# Patient Record
Sex: FEMALE | Race: BLACK OR AFRICAN AMERICAN | NOT HISPANIC OR LATINO
[De-identification: names, ages, dates, MRNs, and addresses within clinical notes are randomized per-mention and may not be internally consistent; named-entity substitution may affect disease eponyms.]

---

## 2017-03-27 ENCOUNTER — APPOINTMENT (OUTPATIENT)
Dept: INTERNAL MEDICINE | Facility: CLINIC | Age: 37
End: 2017-03-27

## 2017-03-27 VITALS
WEIGHT: 204 LBS | DIASTOLIC BLOOD PRESSURE: 60 MMHG | TEMPERATURE: 98 F | BODY MASS INDEX: 32.78 KG/M2 | HEIGHT: 66 IN | SYSTOLIC BLOOD PRESSURE: 110 MMHG | HEART RATE: 69 BPM | OXYGEN SATURATION: 99 %

## 2017-03-27 DIAGNOSIS — Z82.49 FAMILY HISTORY OF ISCHEMIC HEART DISEASE AND OTHER DISEASES OF THE CIRCULATORY SYSTEM: ICD-10-CM

## 2017-03-27 DIAGNOSIS — Z87.891 PERSONAL HISTORY OF NICOTINE DEPENDENCE: ICD-10-CM

## 2017-03-27 DIAGNOSIS — Z78.9 OTHER SPECIFIED HEALTH STATUS: ICD-10-CM

## 2017-03-27 DIAGNOSIS — Z82.3 FAMILY HISTORY OF STROKE: ICD-10-CM

## 2017-03-27 DIAGNOSIS — Z80.3 FAMILY HISTORY OF MALIGNANT NEOPLASM OF BREAST: ICD-10-CM

## 2017-03-27 DIAGNOSIS — H54.7 UNSPECIFIED VISUAL LOSS: ICD-10-CM

## 2017-03-27 DIAGNOSIS — O92.70 UNSPECIFIED DISORDERS OF LACTATION: ICD-10-CM

## 2017-03-27 DIAGNOSIS — N64.52 NIPPLE DISCHARGE: ICD-10-CM

## 2017-03-27 RX ORDER — MELATONIN 3 MG
25 MCG TABLET ORAL
Refills: 0 | Status: ACTIVE | COMMUNITY
Start: 2017-03-27

## 2017-03-27 RX ORDER — COPPER GLUCONATE 2 MG
250 TABLET ORAL
Refills: 0 | Status: ACTIVE | COMMUNITY
Start: 2017-03-27

## 2017-03-28 ENCOUNTER — APPOINTMENT (OUTPATIENT)
Dept: INTERNAL MEDICINE | Facility: CLINIC | Age: 37
End: 2017-03-28

## 2017-03-30 LAB
BASOPHILS # BLD AUTO: 0.03 K/UL
BASOPHILS NFR BLD AUTO: 0.9 %
CHOLEST SERPL-MCNC: 178 MG/DL
CHOLEST/HDLC SERPL: 2.2 RATIO
EOSINOPHIL # BLD AUTO: 0.14 K/UL
EOSINOPHIL NFR BLD AUTO: 4 %
FERRITIN SERPL-MCNC: 5.3 NG/ML
FOLATE SERPL-MCNC: 12.3 NG/ML
HBA1C MFR BLD HPLC: 5.5 %
HCT VFR BLD CALC: 30.7 %
HDLC SERPL-MCNC: 81 MG/DL
HGB BLD-MCNC: 9.4 G/DL
IMM GRANULOCYTES NFR BLD AUTO: 0.3 %
IRON SATN MFR SERPL: 6 %
IRON SERPL-MCNC: 18 UG/DL
LDLC SERPL CALC-MCNC: 87 MG/DL
LYMPHOCYTES # BLD AUTO: 1.92 K/UL
LYMPHOCYTES NFR BLD AUTO: 55.5 %
MAN DIFF?: NORMAL
MCHC RBC-ENTMCNC: 23.7 PG
MCHC RBC-ENTMCNC: 30.6 GM/DL
MCV RBC AUTO: 77.3 FL
MONOCYTES # BLD AUTO: 0.22 K/UL
MONOCYTES NFR BLD AUTO: 6.4 %
NEUTROPHILS # BLD AUTO: 1.14 K/UL
NEUTROPHILS NFR BLD AUTO: 32.9 %
PLATELET # BLD AUTO: 310 K/UL
PROLACTIN SERPL-MCNC: 13.4 NG/ML
RBC # BLD: 3.97 M/UL
RBC # FLD: 17.5 %
TIBC SERPL-MCNC: 321 UG/DL
TRIGL SERPL-MCNC: 50 MG/DL
TSH SERPL-ACNC: 0.91 UIU/ML
UIBC SERPL-MCNC: 303 UG/DL
VIT B12 SERPL-MCNC: >2000 PG/ML
WBC # FLD AUTO: 3.46 K/UL

## 2017-04-11 ENCOUNTER — TRANSCRIPTION ENCOUNTER (OUTPATIENT)
Age: 37
End: 2017-04-11

## 2017-07-03 ENCOUNTER — OUTPATIENT (OUTPATIENT)
Dept: OUTPATIENT SERVICES | Facility: HOSPITAL | Age: 37
LOS: 1 days | End: 2017-07-03
Payer: COMMERCIAL

## 2017-07-03 DIAGNOSIS — Z98.89 OTHER SPECIFIED POSTPROCEDURAL STATES: Chronic | ICD-10-CM

## 2017-07-03 PROCEDURE — A9585: CPT

## 2017-07-03 PROCEDURE — 72197 MRI PELVIS W/O & W/DYE: CPT

## 2017-07-03 PROCEDURE — 72197 MRI PELVIS W/O & W/DYE: CPT | Mod: 26

## 2017-07-12 ENCOUNTER — RESULT CHARGE (OUTPATIENT)
Age: 37
End: 2017-07-12

## 2017-07-12 ENCOUNTER — APPOINTMENT (OUTPATIENT)
Dept: INTERNAL MEDICINE | Facility: CLINIC | Age: 37
End: 2017-07-12

## 2017-07-12 VITALS
SYSTOLIC BLOOD PRESSURE: 100 MMHG | TEMPERATURE: 98.3 F | HEART RATE: 75 BPM | WEIGHT: 210 LBS | DIASTOLIC BLOOD PRESSURE: 80 MMHG | BODY MASS INDEX: 33.9 KG/M2 | OXYGEN SATURATION: 98 %

## 2017-07-12 DIAGNOSIS — N93.9 ABNORMAL UTERINE AND VAGINAL BLEEDING, UNSPECIFIED: ICD-10-CM

## 2017-07-12 DIAGNOSIS — Z01.818 ENCOUNTER FOR OTHER PREPROCEDURAL EXAMINATION: ICD-10-CM

## 2017-07-12 DIAGNOSIS — F40.01 AGORAPHOBIA WITH PANIC DISORDER: ICD-10-CM

## 2017-07-12 LAB
HCG UR QL: NEGATIVE
QUALITY CONTROL: YES

## 2017-07-12 RX ORDER — ANTIARTHRITIC COMBINATION NO.2 900 MG
5000 TABLET ORAL
Refills: 0 | Status: ACTIVE | COMMUNITY
Start: 2017-07-12

## 2017-07-13 LAB
BASOPHILS # BLD AUTO: 0.04 K/UL
BASOPHILS NFR BLD AUTO: 0.9 %
EOSINOPHIL # BLD AUTO: 0.09 K/UL
EOSINOPHIL NFR BLD AUTO: 2.1 %
HCT VFR BLD CALC: 39.2 %
HGB BLD-MCNC: 12.7 G/DL
IMM GRANULOCYTES NFR BLD AUTO: 0 %
LYMPHOCYTES # BLD AUTO: 1.95 K/UL
LYMPHOCYTES NFR BLD AUTO: 44.6 %
MAN DIFF?: NORMAL
MCHC RBC-ENTMCNC: 28.8 PG
MCHC RBC-ENTMCNC: 32.4 GM/DL
MCV RBC AUTO: 88.9 FL
MONOCYTES # BLD AUTO: 0.18 K/UL
MONOCYTES NFR BLD AUTO: 4.1 %
NEUTROPHILS # BLD AUTO: 2.11 K/UL
NEUTROPHILS NFR BLD AUTO: 48.3 %
PLATELET # BLD AUTO: 269 K/UL
RBC # BLD: 4.41 M/UL
RBC # FLD: 17 %
WBC # FLD AUTO: 4.37 K/UL

## 2017-07-14 LAB
ALBUMIN SERPL ELPH-MCNC: 4.3 G/DL
ALP BLD-CCNC: 44 U/L
ALT SERPL-CCNC: 14 U/L
ANION GAP SERPL CALC-SCNC: 13 MMOL/L
APPEARANCE: CLEAR
APTT BLD: 34.6 SEC
AST SERPL-CCNC: 29 U/L
BILIRUB SERPL-MCNC: 0.2 MG/DL
BILIRUBIN URINE: NEGATIVE
BLOOD URINE: NEGATIVE
BUN SERPL-MCNC: 15 MG/DL
CALCIUM SERPL-MCNC: 9.6 MG/DL
CHLORIDE SERPL-SCNC: 107 MMOL/L
CO2 SERPL-SCNC: 20 MMOL/L
COLOR: YELLOW
CREAT SERPL-MCNC: 0.91 MG/DL
GLUCOSE QUALITATIVE U: NORMAL MG/DL
GLUCOSE SERPL-MCNC: 88 MG/DL
INR PPP: 1.01 RATIO
KETONES URINE: NEGATIVE
LEUKOCYTE ESTERASE URINE: NEGATIVE
NITRITE URINE: NEGATIVE
PH URINE: 5
POTASSIUM SERPL-SCNC: 4.7 MMOL/L
PROT SERPL-MCNC: 7.3 G/DL
PROTEIN URINE: NEGATIVE MG/DL
PT BLD: 11.4 SEC
SODIUM SERPL-SCNC: 140 MMOL/L
SPECIFIC GRAVITY URINE: 1.01
UROBILINOGEN URINE: NORMAL MG/DL

## 2017-08-18 ENCOUNTER — OUTPATIENT (OUTPATIENT)
Dept: OUTPATIENT SERVICES | Facility: HOSPITAL | Age: 37
LOS: 1 days | End: 2017-08-18
Payer: COMMERCIAL

## 2017-08-18 DIAGNOSIS — Z98.89 OTHER SPECIFIED POSTPROCEDURAL STATES: Chronic | ICD-10-CM

## 2017-08-18 DIAGNOSIS — Z01.818 ENCOUNTER FOR OTHER PREPROCEDURAL EXAMINATION: ICD-10-CM

## 2017-08-18 LAB
ALBUMIN SERPL ELPH-MCNC: 4.1 G/DL — SIGNIFICANT CHANGE UP (ref 3.3–5)
ALP SERPL-CCNC: 47 U/L — SIGNIFICANT CHANGE UP (ref 40–120)
ALT FLD-CCNC: 15 U/L — SIGNIFICANT CHANGE UP (ref 10–45)
ANION GAP SERPL CALC-SCNC: 12 MMOL/L — SIGNIFICANT CHANGE UP (ref 5–17)
APTT BLD: 32.9 SEC — SIGNIFICANT CHANGE UP (ref 27.5–37.4)
AST SERPL-CCNC: 17 U/L — SIGNIFICANT CHANGE UP (ref 10–40)
BILIRUB SERPL-MCNC: 0.3 MG/DL — SIGNIFICANT CHANGE UP (ref 0.2–1.2)
BUN SERPL-MCNC: 17 MG/DL — SIGNIFICANT CHANGE UP (ref 7–23)
CALCIUM SERPL-MCNC: 9.9 MG/DL — SIGNIFICANT CHANGE UP (ref 8.4–10.5)
CHLORIDE SERPL-SCNC: 105 MMOL/L — SIGNIFICANT CHANGE UP (ref 96–108)
CO2 SERPL-SCNC: 26 MMOL/L — SIGNIFICANT CHANGE UP (ref 22–31)
CREAT SERPL-MCNC: 0.9 MG/DL — SIGNIFICANT CHANGE UP (ref 0.5–1.3)
GLUCOSE SERPL-MCNC: 88 MG/DL — SIGNIFICANT CHANGE UP (ref 70–99)
HCG SERPL-ACNC: <.1 MIU/ML — SIGNIFICANT CHANGE UP
HCT VFR BLD CALC: 38.5 % — SIGNIFICANT CHANGE UP (ref 34.5–45)
HGB BLD-MCNC: 12.8 G/DL — SIGNIFICANT CHANGE UP (ref 11.5–15.5)
INR BLD: 1.08 — SIGNIFICANT CHANGE UP (ref 0.88–1.16)
MCHC RBC-ENTMCNC: 29.7 PG — SIGNIFICANT CHANGE UP (ref 27–34)
MCHC RBC-ENTMCNC: 33.2 G/DL — SIGNIFICANT CHANGE UP (ref 32–36)
MCV RBC AUTO: 89.3 FL — SIGNIFICANT CHANGE UP (ref 80–100)
PLATELET # BLD AUTO: 234 K/UL — SIGNIFICANT CHANGE UP (ref 150–400)
POTASSIUM SERPL-MCNC: 5 MMOL/L — SIGNIFICANT CHANGE UP (ref 3.5–5.3)
POTASSIUM SERPL-SCNC: 5 MMOL/L — SIGNIFICANT CHANGE UP (ref 3.5–5.3)
PROT SERPL-MCNC: 7.4 G/DL — SIGNIFICANT CHANGE UP (ref 6–8.3)
PROTHROM AB SERPL-ACNC: 12 SEC — SIGNIFICANT CHANGE UP (ref 9.8–12.7)
RBC # BLD: 4.31 M/UL — SIGNIFICANT CHANGE UP (ref 3.8–5.2)
RBC # FLD: 13.4 % — SIGNIFICANT CHANGE UP (ref 10.3–16.9)
SODIUM SERPL-SCNC: 143 MMOL/L — SIGNIFICANT CHANGE UP (ref 135–145)
WBC # BLD: 4.7 K/UL — SIGNIFICANT CHANGE UP (ref 3.8–10.5)
WBC # FLD AUTO: 4.7 K/UL — SIGNIFICANT CHANGE UP (ref 3.8–10.5)

## 2017-08-18 PROCEDURE — 85027 COMPLETE CBC AUTOMATED: CPT

## 2017-08-18 PROCEDURE — 85610 PROTHROMBIN TIME: CPT

## 2017-08-18 PROCEDURE — 80053 COMPREHEN METABOLIC PANEL: CPT

## 2017-08-18 PROCEDURE — 36415 COLL VENOUS BLD VENIPUNCTURE: CPT

## 2017-08-18 PROCEDURE — 84702 CHORIONIC GONADOTROPIN TEST: CPT

## 2017-08-18 PROCEDURE — 85730 THROMBOPLASTIN TIME PARTIAL: CPT

## 2017-08-22 ENCOUNTER — OUTPATIENT (OUTPATIENT)
Dept: OUTPATIENT SERVICES | Facility: HOSPITAL | Age: 37
LOS: 1 days | End: 2017-08-22
Payer: COMMERCIAL

## 2017-08-22 DIAGNOSIS — Z98.89 OTHER SPECIFIED POSTPROCEDURAL STATES: Chronic | ICD-10-CM

## 2017-08-22 PROCEDURE — 36246 INS CATH ABD/L-EXT ART 2ND: CPT | Mod: 59

## 2017-08-22 PROCEDURE — 37243 VASC EMBOLIZE/OCCLUDE ORGAN: CPT

## 2017-08-22 PROCEDURE — C1887: CPT

## 2017-08-22 PROCEDURE — C1889: CPT

## 2017-08-22 PROCEDURE — C1769: CPT

## 2017-08-22 PROCEDURE — 36247 INS CATH ABD/L-EXT ART 3RD: CPT

## 2017-08-22 PROCEDURE — C1894: CPT

## 2017-08-22 RX ORDER — SENNA PLUS 8.6 MG/1
1 TABLET ORAL
Qty: 10 | Refills: 0 | OUTPATIENT
Start: 2017-08-22 | End: 2017-08-27

## 2017-08-22 RX ORDER — HYDROCODONE BITARTRATE AND ACETAMINOPHEN 7.5; 325 MG/15ML; MG/15ML
2 SOLUTION ORAL
Qty: 60 | Refills: 0
Start: 2017-08-22 | End: 2017-08-27

## 2017-08-22 RX ORDER — ONDANSETRON 8 MG/1
1 TABLET, FILM COATED ORAL
Qty: 15 | Refills: 0 | OUTPATIENT
Start: 2017-08-22 | End: 2017-08-27

## 2017-08-22 RX ORDER — SCOPALAMINE 1 MG/3D
1 PATCH, EXTENDED RELEASE TRANSDERMAL
Qty: 4 | Refills: 0 | OUTPATIENT
Start: 2017-08-22

## 2017-08-22 RX ORDER — SCOPALAMINE 1 MG/3D
1.5 PATCH, EXTENDED RELEASE TRANSDERMAL ONCE
Qty: 0 | Refills: 0 | Status: DISCONTINUED | OUTPATIENT
Start: 2017-08-22 | End: 2017-09-06

## 2017-08-22 RX ORDER — SCOPALAMINE 1 MG/3D
1 PATCH, EXTENDED RELEASE TRANSDERMAL
Qty: 3 | Refills: 0 | OUTPATIENT
Start: 2017-08-22 | End: 2017-09-01

## 2017-08-22 RX ORDER — ACETAMINOPHEN WITH CODEINE 300MG-30MG
2 TABLET ORAL
Qty: 40 | Refills: 0 | OUTPATIENT
Start: 2017-08-22 | End: 2017-08-27

## 2017-08-23 ENCOUNTER — EMERGENCY (EMERGENCY)
Facility: HOSPITAL | Age: 37
LOS: 1 days | Discharge: HOME | End: 2017-08-23
Attending: EMERGENCY MEDICINE | Admitting: EMERGENCY MEDICINE
Payer: COMMERCIAL

## 2017-08-23 VITALS
DIASTOLIC BLOOD PRESSURE: 93 MMHG | HEART RATE: 69 BPM | OXYGEN SATURATION: 97 % | SYSTOLIC BLOOD PRESSURE: 142 MMHG | RESPIRATION RATE: 18 BRPM | TEMPERATURE: 99 F

## 2017-08-23 DIAGNOSIS — Z98.89 OTHER SPECIFIED POSTPROCEDURAL STATES: Chronic | ICD-10-CM

## 2017-08-23 DIAGNOSIS — Z98.890 OTHER SPECIFIED POSTPROCEDURAL STATES: Chronic | ICD-10-CM

## 2017-08-23 DIAGNOSIS — D25.9 LEIOMYOMA OF UTERUS, UNSPECIFIED: ICD-10-CM

## 2017-08-23 DIAGNOSIS — R19.7 DIARRHEA, UNSPECIFIED: ICD-10-CM

## 2017-08-23 DIAGNOSIS — Z88.8 ALLERGY STATUS TO OTHER DRUGS, MEDICAMENTS AND BIOLOGICAL SUBSTANCES: ICD-10-CM

## 2017-08-23 DIAGNOSIS — R00.0 TACHYCARDIA, UNSPECIFIED: ICD-10-CM

## 2017-08-23 DIAGNOSIS — R10.30 LOWER ABDOMINAL PAIN, UNSPECIFIED: ICD-10-CM

## 2017-08-23 DIAGNOSIS — R06.02 SHORTNESS OF BREATH: ICD-10-CM

## 2017-08-23 DIAGNOSIS — J45.909 UNSPECIFIED ASTHMA, UNCOMPLICATED: ICD-10-CM

## 2017-08-23 DIAGNOSIS — Z79.899 OTHER LONG TERM (CURRENT) DRUG THERAPY: ICD-10-CM

## 2017-08-23 PROCEDURE — 80053 COMPREHEN METABOLIC PANEL: CPT

## 2017-08-23 PROCEDURE — 99284 EMERGENCY DEPT VISIT MOD MDM: CPT | Mod: 25

## 2017-08-23 PROCEDURE — 74177 CT ABD & PELVIS W/CONTRAST: CPT

## 2017-08-23 PROCEDURE — 96376 TX/PRO/DX INJ SAME DRUG ADON: CPT | Mod: XU

## 2017-08-23 PROCEDURE — 96374 THER/PROPH/DIAG INJ IV PUSH: CPT | Mod: XU

## 2017-08-23 PROCEDURE — 85730 THROMBOPLASTIN TIME PARTIAL: CPT

## 2017-08-23 PROCEDURE — 36415 COLL VENOUS BLD VENIPUNCTURE: CPT

## 2017-08-23 PROCEDURE — 85025 COMPLETE CBC W/AUTO DIFF WBC: CPT

## 2017-08-23 PROCEDURE — 83690 ASSAY OF LIPASE: CPT

## 2017-08-23 PROCEDURE — 74177 CT ABD & PELVIS W/CONTRAST: CPT | Mod: 26

## 2017-08-23 PROCEDURE — 85610 PROTHROMBIN TIME: CPT

## 2017-08-23 PROCEDURE — 96375 TX/PRO/DX INJ NEW DRUG ADDON: CPT | Mod: XU

## 2017-08-23 RX ORDER — MORPHINE SULFATE 50 MG/1
2 CAPSULE, EXTENDED RELEASE ORAL ONCE
Qty: 0 | Refills: 0 | Status: DISCONTINUED | OUTPATIENT
Start: 2017-08-23 | End: 2017-08-23

## 2017-08-23 RX ORDER — METOCLOPRAMIDE HCL 10 MG
10 TABLET ORAL ONCE
Qty: 0 | Refills: 0 | Status: DISCONTINUED | OUTPATIENT
Start: 2017-08-23 | End: 2017-08-23

## 2017-08-23 RX ORDER — ONDANSETRON 8 MG/1
4 TABLET, FILM COATED ORAL ONCE
Qty: 0 | Refills: 0 | Status: COMPLETED | OUTPATIENT
Start: 2017-08-23 | End: 2017-08-23

## 2017-08-23 RX ORDER — KETOROLAC TROMETHAMINE 30 MG/ML
1 SYRINGE (ML) INJECTION
Qty: 9 | Refills: 0
Start: 2017-08-23 | End: 2017-08-26

## 2017-08-23 RX ORDER — KETOROLAC TROMETHAMINE 30 MG/ML
30 SYRINGE (ML) INJECTION ONCE
Qty: 0 | Refills: 0 | Status: DISCONTINUED | OUTPATIENT
Start: 2017-08-23 | End: 2017-08-23

## 2017-08-23 RX ORDER — TRAMADOL HYDROCHLORIDE 50 MG/1
1 TABLET ORAL
Qty: 9 | Refills: 0 | OUTPATIENT
Start: 2017-08-23 | End: 2017-08-26

## 2017-08-23 RX ORDER — MORPHINE SULFATE 50 MG/1
1 CAPSULE, EXTENDED RELEASE ORAL ONCE
Qty: 0 | Refills: 0 | Status: DISCONTINUED | OUTPATIENT
Start: 2017-08-23 | End: 2017-08-23

## 2017-08-23 RX ADMIN — MORPHINE SULFATE 2 MILLIGRAM(S): 50 CAPSULE, EXTENDED RELEASE ORAL at 00:50

## 2017-08-23 RX ADMIN — MORPHINE SULFATE 2 MILLIGRAM(S): 50 CAPSULE, EXTENDED RELEASE ORAL at 01:10

## 2017-08-23 RX ADMIN — Medication 30 MILLIGRAM(S): at 03:32

## 2017-08-23 RX ADMIN — Medication 30 MILLIGRAM(S): at 04:08

## 2017-08-23 RX ADMIN — MORPHINE SULFATE 1 MILLIGRAM(S): 50 CAPSULE, EXTENDED RELEASE ORAL at 02:10

## 2017-08-23 RX ADMIN — MORPHINE SULFATE 1 MILLIGRAM(S): 50 CAPSULE, EXTENDED RELEASE ORAL at 03:34

## 2017-08-23 RX ADMIN — ONDANSETRON 4 MILLIGRAM(S): 8 TABLET, FILM COATED ORAL at 01:10

## 2017-08-23 NOTE — ED PROVIDER NOTE - NS ED ROS FT
+ b/l lower abd pain, light vaginal bleeding, nausea  - dysuria, weakness, hematuria, diarrhea, vomiting, SOB, palpitations

## 2017-08-23 NOTE — ED PROVIDER NOTE - MEDICAL DECISION MAKING DETAILS
Pain not controlled with PO pain meds. Will give IV morphine. Ordered for CBC to r/o active bleeding, CT abd/pelvis to assess for post surgical complications. Pain not controlled with PO pain meds. Will give IV morphine, zofran. Ordered for CBC to r/o active bleeding, CT abd/pelvis to assess for post surgical complications.

## 2017-08-23 NOTE — ED ADULT NURSE REASSESSMENT NOTE - NS ED NURSE REASSESS COMMENT FT1
36 y/o female, s/p uterine embolization, arrived to [facility] ER reporting severe lower abdominal pain accompanied with nausea after procedure 8/22/17. Pt verbalized pain is more intense then before procedure was performed. Upon assessment, abdomen soft, lung fields WNL, breathing is equal and unlabored, pulses palpable. Pt denies chest pain, headache, dizziness, blurred vision, slurred speech, fever, chills, LOC, SOB, weakness, fatigue, recent travel, recent injury, and palpitations. Care in progress. Awaiting disposition
Pt was evaluated, treated and discharged to home. Follow up instructions provided.
IVHL removed from RAC, pt TBD, 2/10 pain, Dr Salinas aware.

## 2017-08-23 NOTE — ED PROVIDER NOTE - FAMILY HISTORY
Mother  Still living? Unknown  Family history of hypertension, Age at diagnosis: Age Unknown     Father  Still living? Unknown  Family history of pulmonary embolism, Age at diagnosis: Age Unknown

## 2017-08-23 NOTE — ED PROVIDER NOTE - OBJECTIVE STATEMENT
Patient is a 37 year old female with a PMHx of asthma and fibroids s/p uterine artery emobolization this AM by IR (Dr Oconnor) who presents with persistent lower abdominal pain. She describes it as crampy and sharp that radiates to the back and is relieved by putting herself in the fetal position. She also notes light vaginal bleeding since the morning as well. Patient was given a Rx for percocet and was instructed to call IR if her pain was not controlled however no one answered. Denies pain at incision site. Patient also reports nausea but no vomiting, diarrhea, CP, palpitations, weakness or SOB. Patient is a 37 year old female with a PMHx of asthma and fibroids s/p uterine artery emobolization this AM by IR (Dr Oconnor) who presents with persistent lower abdominal pain. She describes it as crampy and sharp that radiates to the back and is relieved by putting herself in the fetal position. She also notes light vaginal bleeding since the morning as well. Patient was given a Rx for percocet and was instructed to call IR if her pain was not controlled however no one answered. Denies pain at incision site. Patient also reports nausea but no vomiting, diarrhea, CP, palpitations, weakness or SOB.    MRI pelvis w/w/out 8/18: 2 enhancing uterine fibroids measuring up to 1.8 cm. Evidence of adenomyosis.  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 Prominent right uterine artery. No evidence of collateral flow to the uterus. Right uterine artery embolization performed, as described above. Extravascular closure device.

## 2017-08-23 NOTE — ED PROVIDER NOTE - ATTENDING CONTRIBUTION TO CARE
36yo female with R uterine artery embolization for fibroids and bleeding earlier today returning to ER with recurrent pelvic pain. Pt denies vaginal bleeding, discharge. States that pain is similar to severe menstrual cramping. CT scan negative for acute bleeding or perforation. H/H stable, pain control and discharge.

## 2018-01-22 ENCOUNTER — APPOINTMENT (OUTPATIENT)
Dept: INTERNAL MEDICINE | Facility: CLINIC | Age: 38
End: 2018-01-22
Payer: COMMERCIAL

## 2018-01-22 ENCOUNTER — RX RENEWAL (OUTPATIENT)
Age: 38
End: 2018-01-22

## 2018-01-22 VITALS
DIASTOLIC BLOOD PRESSURE: 78 MMHG | SYSTOLIC BLOOD PRESSURE: 113 MMHG | OXYGEN SATURATION: 98 % | WEIGHT: 221 LBS | TEMPERATURE: 98.1 F | HEART RATE: 80 BPM | BODY MASS INDEX: 35.67 KG/M2

## 2018-01-22 DIAGNOSIS — Z86.69 PERSONAL HISTORY OF OTHER DISEASES OF THE NERVOUS SYSTEM AND SENSE ORGANS: ICD-10-CM

## 2018-01-22 PROCEDURE — 99214 OFFICE O/P EST MOD 30 MIN: CPT | Mod: 25,GC

## 2018-01-22 RX ORDER — ALPRAZOLAM 0.5 MG/1
0.5 TABLET ORAL
Qty: 2 | Refills: 0 | Status: DISCONTINUED | COMMUNITY
Start: 2017-07-12 | End: 2018-01-22

## 2018-02-26 ENCOUNTER — RX RENEWAL (OUTPATIENT)
Age: 38
End: 2018-02-26

## 2018-03-01 ENCOUNTER — CLINICAL ADVICE (OUTPATIENT)
Age: 38
End: 2018-03-01

## 2018-03-01 ENCOUNTER — APPOINTMENT (OUTPATIENT)
Dept: INTERNAL MEDICINE | Facility: CLINIC | Age: 38
End: 2018-03-01

## 2018-03-02 ENCOUNTER — APPOINTMENT (OUTPATIENT)
Dept: INTERNAL MEDICINE | Facility: CLINIC | Age: 38
End: 2018-03-02
Payer: COMMERCIAL

## 2018-03-02 VITALS
BODY MASS INDEX: 37.12 KG/M2 | DIASTOLIC BLOOD PRESSURE: 81 MMHG | RESPIRATION RATE: 14 BRPM | OXYGEN SATURATION: 100 % | WEIGHT: 230 LBS | HEART RATE: 77 BPM | SYSTOLIC BLOOD PRESSURE: 116 MMHG | TEMPERATURE: 98.2 F

## 2018-03-02 DIAGNOSIS — R10.13 EPIGASTRIC PAIN: ICD-10-CM

## 2018-03-02 PROCEDURE — 36415 COLL VENOUS BLD VENIPUNCTURE: CPT

## 2018-03-02 PROCEDURE — 99214 OFFICE O/P EST MOD 30 MIN: CPT | Mod: 25,GC

## 2018-03-02 RX ORDER — CIPROFLOXACIN 3 MG/ML
0.3 SOLUTION OPHTHALMIC
Qty: 1 | Refills: 0 | Status: DISCONTINUED | COMMUNITY
Start: 2018-01-22 | End: 2018-03-02

## 2018-03-07 LAB — LPL SERPL-CCNC: 32 U/L

## 2018-05-24 ENCOUNTER — RX RENEWAL (OUTPATIENT)
Age: 38
End: 2018-05-24

## 2018-06-05 ENCOUNTER — APPOINTMENT (OUTPATIENT)
Dept: INTERNAL MEDICINE | Facility: CLINIC | Age: 38
End: 2018-06-05

## 2018-07-09 ENCOUNTER — APPOINTMENT (OUTPATIENT)
Dept: INTERNAL MEDICINE | Facility: CLINIC | Age: 38
End: 2018-07-09

## 2019-02-07 PROBLEM — D25.9 LEIOMYOMA OF UTERUS, UNSPECIFIED: Chronic | Status: ACTIVE | Noted: 2017-08-23

## 2019-04-01 PROBLEM — N64.52 DISCHARGE OF BREAST: Status: ACTIVE | Noted: 2017-03-27

## 2019-04-19 ENCOUNTER — APPOINTMENT (OUTPATIENT)
Dept: ENDOCRINOLOGY | Facility: CLINIC | Age: 39
End: 2019-04-19
Payer: COMMERCIAL

## 2019-04-19 VITALS
BODY MASS INDEX: 43.32 KG/M2 | DIASTOLIC BLOOD PRESSURE: 81 MMHG | HEART RATE: 80 BPM | SYSTOLIC BLOOD PRESSURE: 116 MMHG | WEIGHT: 260 LBS | HEIGHT: 65 IN

## 2019-04-19 DIAGNOSIS — J30.2 OTHER SEASONAL ALLERGIC RHINITIS: ICD-10-CM

## 2019-04-19 PROCEDURE — 99204 OFFICE O/P NEW MOD 45 MIN: CPT

## 2019-04-19 RX ORDER — MULTIVITAMIN
CAPSULE ORAL
Refills: 0 | Status: ACTIVE | COMMUNITY

## 2019-04-22 LAB
25(OH)D3 SERPL-MCNC: 40.8 NG/ML
ALBUMIN SERPL ELPH-MCNC: 3.8 G/DL
ALP BLD-CCNC: 45 U/L
ALT SERPL-CCNC: 16 U/L
ANION GAP SERPL CALC-SCNC: 14 MMOL/L
AST SERPL-CCNC: 24 U/L
BASOPHILS # BLD AUTO: 0.02 K/UL
BASOPHILS NFR BLD AUTO: 0.5 %
BILIRUB SERPL-MCNC: <0.2 MG/DL
BUN SERPL-MCNC: 17 MG/DL
CALCIUM SERPL-MCNC: 9.2 MG/DL
CHLORIDE SERPL-SCNC: 108 MMOL/L
CHOLEST SERPL-MCNC: 161 MG/DL
CHOLEST/HDLC SERPL: 2.4 RATIO
CO2 SERPL-SCNC: 19 MMOL/L
CREAT SERPL-MCNC: 0.8 MG/DL
EOSINOPHIL # BLD AUTO: 0.06 K/UL
EOSINOPHIL NFR BLD AUTO: 1.6 %
ESTIMATED AVERAGE GLUCOSE: 97 MG/DL
GLUCOSE SERPL-MCNC: 100 MG/DL
HBA1C MFR BLD HPLC: 5 %
HCT VFR BLD CALC: 36.7 %
HDLC SERPL-MCNC: 67 MG/DL
HGB BLD-MCNC: 11.6 G/DL
IMM GRANULOCYTES NFR BLD AUTO: 0.3 %
LDLC SERPL CALC-MCNC: 82 MG/DL
LYMPHOCYTES # BLD AUTO: 1.86 K/UL
LYMPHOCYTES NFR BLD AUTO: 48.7 %
MAN DIFF?: NORMAL
MCHC RBC-ENTMCNC: 28 PG
MCHC RBC-ENTMCNC: 31.6 GM/DL
MCV RBC AUTO: 88.6 FL
MONOCYTES # BLD AUTO: 0.37 K/UL
MONOCYTES NFR BLD AUTO: 9.7 %
NEUTROPHILS # BLD AUTO: 1.5 K/UL
NEUTROPHILS NFR BLD AUTO: 39.2 %
PLATELET # BLD AUTO: 214 K/UL
POTASSIUM SERPL-SCNC: 4.6 MMOL/L
PROLACTIN SERPL-MCNC: 10.3 NG/ML
PROT SERPL-MCNC: 6.5 G/DL
RBC # BLD: 4.14 M/UL
RBC # FLD: 14.1 %
SODIUM SERPL-SCNC: 141 MMOL/L
T4 FREE SERPL-MCNC: 1 NG/DL
TRIGL SERPL-MCNC: 61 MG/DL
TSH SERPL-ACNC: 0.84 UIU/ML
WBC # FLD AUTO: 3.82 K/UL

## 2019-04-23 ENCOUNTER — TRANSCRIPTION ENCOUNTER (OUTPATIENT)
Age: 39
End: 2019-04-23

## 2019-04-29 LAB
MONOMERIC PROLACTIN (ICMA)*: 8 NG/ML
PERCENT MACROPROLACTIN: 6 %
PROLACTIN, SERUM (ICMA)*: 8.5 NG/ML

## 2019-04-29 NOTE — HISTORY OF PRESENT ILLNESS
[FreeTextEntry1] : Ms. Mittal is a 38 year-old woman with a history of migraine headaches, elevated body mass index presenting to establish care with me.\par \par Elevated body mass index. Comorbidity of gastroesophageal reflux disease. She is status post laparoscopic banding in 2011 and gastric sleeve in 2014.\par Her young adult weight was around 200 pounds. Her highest weight was 307 pounds. \par Her roya weight after her second surgical procedure was 196 pounds. She attributes recent weight gain to uterine artery embolization. \par She has never tried any medications for weight loss.\par She will be establishing care with a nutritionist through her surgeon.\par 24 hour diet recall: breakfast, slice of white toast, ice coffee with creamer; lunch, fried fish, macaroni and cheese, ele greens; dinner, steak quesadilla; snacks, sunflower seeds; had Sangria yesterday\par \par Galactorrhea.\par She has had expressible galactorrhea since the birth of her last child, who is now 18 years old.\par She has a history of migraine headaches on the left side of her head, with photophobia. She now has associated nausea and dizziness. Her headaches used to occur around the time of her menstrual period, now 7-8 times per month. Sumatriptan is now less effective.\par She wears glasses; no recent change in vision. \par Menses are regular.\par Prolactin was 13.4 ng/mL in March 2017.\par \par She has fatigue, hoarse voice, dry skin, polydipsia; discussed with her other treating physicians. Her mother and sister have a history of nontoxic multinodular goiter; no thyroid cancer.

## 2019-04-29 NOTE — ASSESSMENT
[FreeTextEntry1] : Elevated body mass index. Comorbidity of gastroesophageal reflux disease. She is status post laparoscopic banding in 2011 and gastric sleeve in 2014. We reviewed lifestyle modifications for weight loss. She will establish care with nutrition. We discussed pharmacologic options for weight loss. Due to comorbid migraine headaches, we discussed topiramate. We discussed the risks and benefits, including but not limited to dizziness, drowsiness, paresthesias.\par Check laboratory testing as below\par Lifestyle modification\par Establish care with nutrition\par Start topiramate 25 mg at night for one week, then 25 mg twice daily for one week, then 50 mg twice daily as tolerated\par \par Expressible galactorrhea. Prolactin was normal in 2017. We will repeat now for further evaluation.\par Check prolactin\par \par Thyromegaly. Her mother and sister have a history of nontoxic multinodular goiter; no thyroid cancer.\par Check thyroid ultrasound\par \par She will see a neurologist for further management of migraine headache.\par \par CC:\par Dr. Jessie So, Fax [phone]

## 2019-04-29 NOTE — ADDENDUM
[FreeTextEntry1] : Recent test results as below. Prolactin again normal and galactorrhea appears idiopathic. Other test results within range. Will send a letter to Ms. Mittal with results. 4/22/19

## 2019-04-29 NOTE — PHYSICAL EXAM
[Alert] : alert [No Acute Distress] : no acute distress [Normal Sclera/Conjunctiva] : normal sclera/conjunctiva [Healthy Appearance] : healthy appearance [Normal Oropharynx] : the oropharynx was normal [Visual Fields Grossly Intact] : visual fields grossly intact [Supple] : the neck was supple [No Neck Mass] : no neck mass was observed [No LAD] : no lymphadenopathy [Normal Rate and Effort] : normal respiratory rhythm and effort [Clear to Auscultation] : lungs were clear to auscultation bilaterally [Normal S1, S2] : normal S1 and S2 [Normal Rate] : heart rate was normal  [Regular Rhythm] : with a regular rhythm [Normal Gait] : normal gait [No Stigmata of Cushings Syndrome] : no stigmata of cushings syndrome [Normal Insight/Judgement] : insight and judgment were intact [Acanthosis Nigricans] : acanthosis nigricans [Kyphosis] : no kyphosis present [de-identified] : thyroid about 1.5 times normal size, no discrete nodules noted [de-identified] : no moon facies, no supraclavicular fat pads

## 2019-06-19 ENCOUNTER — RX RENEWAL (OUTPATIENT)
Age: 39
End: 2019-06-19

## 2019-09-26 ENCOUNTER — RESULT REVIEW (OUTPATIENT)
Age: 39
End: 2019-09-26

## 2019-09-26 ENCOUNTER — OUTPATIENT (OUTPATIENT)
Dept: OUTPATIENT SERVICES | Facility: HOSPITAL | Age: 39
LOS: 1 days | Discharge: HOME | End: 2019-09-26

## 2019-09-26 DIAGNOSIS — Z98.89 OTHER SPECIFIED POSTPROCEDURAL STATES: Chronic | ICD-10-CM

## 2019-09-26 DIAGNOSIS — Z98.890 OTHER SPECIFIED POSTPROCEDURAL STATES: Chronic | ICD-10-CM

## 2019-09-27 LAB — SURGICAL PATHOLOGY STUDY: SIGNIFICANT CHANGE UP

## 2019-10-08 ENCOUNTER — APPOINTMENT (OUTPATIENT)
Dept: ENDOCRINOLOGY | Facility: CLINIC | Age: 39
End: 2019-10-08

## 2019-12-09 ENCOUNTER — APPOINTMENT (OUTPATIENT)
Dept: ENDOCRINOLOGY | Facility: CLINIC | Age: 39
End: 2019-12-09
Payer: COMMERCIAL

## 2019-12-09 VITALS
WEIGHT: 265 LBS | HEART RATE: 78 BPM | DIASTOLIC BLOOD PRESSURE: 85 MMHG | SYSTOLIC BLOOD PRESSURE: 120 MMHG | BODY MASS INDEX: 44.1 KG/M2

## 2019-12-09 DIAGNOSIS — N64.3 GALACTORRHEA NOT ASSOCIATED WITH CHILDBIRTH: ICD-10-CM

## 2019-12-09 PROCEDURE — 99214 OFFICE O/P EST MOD 30 MIN: CPT

## 2019-12-13 ENCOUNTER — RX RENEWAL (OUTPATIENT)
Age: 39
End: 2019-12-13

## 2019-12-17 NOTE — PHYSICAL EXAM
[Alert] : alert [No Acute Distress] : no acute distress [Healthy Appearance] : healthy appearance [Normal Sclera/Conjunctiva] : normal sclera/conjunctiva [Visual Fields Grossly Intact] : visual fields grossly intact [Normal Oropharynx] : the oropharynx was normal [No Neck Mass] : no neck mass was observed [Supple] : the neck was supple [No LAD] : no lymphadenopathy [Normal Rate and Effort] : normal respiratory rhythm and effort [Clear to Auscultation] : lungs were clear to auscultation bilaterally [Normal Rate] : heart rate was normal  [Normal S1, S2] : normal S1 and S2 [Regular Rhythm] : with a regular rhythm [No Stigmata of Cushings Syndrome] : no stigmata of cushings syndrome [Normal Gait] : normal gait [Acanthosis Nigricans] : acanthosis nigricans [Normal Insight/Judgement] : insight and judgment were intact [Kyphosis] : no kyphosis present [de-identified] : thyroid about 1.5 times normal size, no discrete nodules noted [de-identified] : no moon facies, no supraclavicular fat pads

## 2019-12-17 NOTE — ADDENDUM
[FreeTextEntry1] : Recent thyroid ultrasound with subcentimeter cystic nodules in the right lobe; discussed with Ms. Mittal. No nodules meet criteria for biopsy. We can repeat a thyroid ultrasound in 2-3 years for monitoring. 12/17/19

## 2019-12-17 NOTE — ASSESSMENT
[FreeTextEntry1] : Elevated body mass index. Comorbidity of gastroesophageal reflux disease. She is status post laparoscopic banding in 2011 and gastric sleeve in 2014. We reviewed lifestyle modifications for weight loss. We discussed referral to nutrition. We discussed pharmacologic options for weight loss. She is tolerating topiramate and we can continue. We discussed addition of phentermine and she is amenable. We discussed the risks and benefits, including but not limited to headache, palpitations, hypertension. \par Lifestyle modification\par Referral to nutrition\par Continue topiramate 50 mg twice daily\par Start phentermine 15 mg daily\par \par Thyromegaly. Her mother and sister have a history of nontoxic multinodular goiter; no thyroid cancer.\par Check thyroid ultrasound\par \par Expressible galactorrhea. Idiopathic. Prolactin has been normal. \par \par Migraine headaches. I advised she see a neurologist.\par \par Return to see me in 2 months. \par \par CC:\par Dr. Jessie So, Fax [phone]

## 2019-12-17 NOTE — HISTORY OF PRESENT ILLNESS
[FreeTextEntry1] : Ms. Mittal is a 39 year-old woman with a history of migraine headaches, elevated body mass index presenting for follow-up of elevated body mass index. I saw her for an initial visit in April 2019. \par \par Elevated body mass index. Comorbidity of gastroesophageal reflux disease. She is status post laparoscopic banding in 2011 and gastric sleeve in 2014.\par Her young adult weight was around 200 pounds. Her highest weight was 307 pounds. \par Her roya weight after her second surgical procedure was 196 pounds. She attributes recent weight gain to uterine artery embolization. \par She had never tried any medications for weight loss. We started topiramate up to 50 mg twice daily in April 2019.\par 24 hour diet recall: breakfast, 1/2 turkey and cheese wrap, a little mayonnaise, lettuce, tomato, iced coffee; lunch, 1/2 turkey and cheese wrap, a little mayonnaise, lettuce, tomato; dinner, shrimp, chicken, pepper stir johnson with teriyaki sauce, greens; peach juice\par Exercise: Stationary bicycle 15-30 minutes most days\par \par Galactorrhea.\par She has had expressible galactorrhea since the birth of her last child, who is now 18 years old.\par She has a history of migraine headaches on the left side of her head, with photophobia. She was having associated nausea and dizziness. Her headaches have decreased in number since starting topiramate.\par Menses are regular.\par Prolactin levels have been normal. \par She has annual mammogram testing through her gynecologist. \par \par Interim History \par Last visit we started topiramate for weight loss, with improvement in migraine headache frequency. Weight is up 5 pounds since last visit.\par She had a recent biopsy to evaluate for endometrial hyperplasia.\par Medical and surgical history, medications, allergies, social and family history reviewed and updated as needed.

## 2019-12-23 ENCOUNTER — APPOINTMENT (OUTPATIENT)
Dept: ENDOCRINOLOGY | Facility: CLINIC | Age: 39
End: 2019-12-23
Payer: COMMERCIAL

## 2019-12-23 VITALS — BODY MASS INDEX: 42.43 KG/M2 | WEIGHT: 255 LBS

## 2019-12-23 PROCEDURE — 97802 MEDICAL NUTRITION INDIV IN: CPT

## 2020-01-30 ENCOUNTER — APPOINTMENT (OUTPATIENT)
Dept: ENDOCRINOLOGY | Facility: CLINIC | Age: 40
End: 2020-01-30
Payer: COMMERCIAL

## 2020-01-30 VITALS
WEIGHT: 244 LBS | BODY MASS INDEX: 40.65 KG/M2 | SYSTOLIC BLOOD PRESSURE: 122 MMHG | HEIGHT: 65 IN | HEART RATE: 102 BPM | DIASTOLIC BLOOD PRESSURE: 84 MMHG

## 2020-01-30 PROCEDURE — 99214 OFFICE O/P EST MOD 30 MIN: CPT

## 2020-01-30 NOTE — HISTORY OF PRESENT ILLNESS
[FreeTextEntry1] : Ms. Mittal is a 39 year-old woman with a history of migraine headaches, elevated body mass index presenting for follow-up of elevated body mass index. I saw her for an initial visit in April 2019 and last in December. \par \par Elevated body mass index. Comorbidity of gastroesophageal reflux disease. She is status post laparoscopic banding in 2011 and gastric sleeve in 2014.\par Her young adult weight was around 200 pounds. Her highest weight was 307 pounds. \par Her roya weight after her second surgical procedure was 196 pounds. She attributes recent weight gain to uterine artery embolization. \par She had never tried any medications for weight loss. We started topiramate up to 50 mg twice daily in April 2019 and phentermine 15 mg daily in December.\par \par Thyroid nodules.\par She was noted to have right subcentimeter thyroid nodules on ultrasound in December 2019. \par She has been clinically and biochemically euthyroid. \par Her mother and sister have a history of thyroid nodules. No family history of thyroid cancer. \par \par Galactorrhea.\par She has had expressible galactorrhea since the birth of her last child, who is now 18 years old.\par She has a history of migraine headaches on the left side of her head, with photophobia. She was having associated nausea and dizziness. Her headaches have decreased in number since starting topiramate.\par Menses are regular.\par Prolactin levels have been normal. \par She has annual mammogram testing through her gynecologist. \par \par Interim History \par Last visit we started phentermine. She has been using the [employer] jakob. She saw Kamilla Cardona/yu in December. \par Thyroid ultrasound in December 2019 with subcentimeter cystic nodules in the right lobe. No nodules meet criteria for biopsy. \par Weight is down 19 pounds since last visit. No migraine headaches. \par Medical and surgical history, medications, allergies, social and family history reviewed and updated as needed.

## 2020-01-30 NOTE — PHYSICAL EXAM
[Alert] : alert [No Acute Distress] : no acute distress [Healthy Appearance] : healthy appearance [Normal Sclera/Conjunctiva] : normal sclera/conjunctiva [Visual Fields Grossly Intact] : visual fields grossly intact [Normal Oropharynx] : the oropharynx was normal [No Neck Mass] : no neck mass was observed [Supple] : the neck was supple [No LAD] : no lymphadenopathy [Normal Rate and Effort] : normal respiratory rhythm and effort [Clear to Auscultation] : lungs were clear to auscultation bilaterally [Normal Rate] : heart rate was normal  [Normal S1, S2] : normal S1 and S2 [Regular Rhythm] : with a regular rhythm [No Stigmata of Cushings Syndrome] : no stigmata of cushings syndrome [Normal Gait] : normal gait [Acanthosis Nigricans] : acanthosis nigricans [Normal Insight/Judgement] : insight and judgment were intact [Kyphosis] : no kyphosis present [de-identified] : thyroid about 1.5 times normal size, no discrete nodules noted [de-identified] : no moon facies, no supraclavicular fat pads

## 2020-01-30 NOTE — DATA REVIEWED
[FreeTextEntry1] : Thyroid ultrasound (December 16, 2019) reviewed and significant for:\par ISTHMUS: Normal size in AP dimension measuring 0.4 cm.\par RIGHT LOBE:  Measures 4.7 x 1.5 x 2.1 cm in sagittal x AP x transverse dimensions.  Volume 7.9 cc.\par ARCHITECTURE: Upper pole cystic nodule measures up to 0.5 cm. Upper midpole cystic nodule measures up to 0.2 cm. Midpole cystic nodule measures up to 0.3 cm.\par COLOR DOPPLER:  Unremarkable. \par LEFT LOBE:    Measures 4.7 x 1.1 x 2.3 cm in sagittal x AP x transverse dimensions. Volume 6.4 cc.\par ARCHITECTURE: Homogeneous without a discrete nodule.\par COLOR DOPPLER:  Unremarkable. \par Visualized bilateral cervical lymph nodes are within normal limits for size and morphology.\par IMPRESSION:\par Benign cystic thyroid nodules in the right lobe.

## 2020-01-30 NOTE — ASSESSMENT
[FreeTextEntry1] : Elevated body mass index. Comorbidity of gastroesophageal reflux disease. She is status post laparoscopic banding in 2011 and gastric sleeve in 2014. I congratulated her on her weight loss. We reviewed lifestyle modifications for weight loss. We discussed follow-up nutrition. We discussed pharmacologic options for weight loss. She is tolerating topiramate and phentermine and we can continue. \par Check complete blood count, comprehensive metabolic panel, hemoglobin A1c, lipid panel, TSH next visit\par Lifestyle modification\par Follow-up with nutrition\par Continue topiramate 50 mg twice daily\par Continue phentermine 15 mg daily\par \par Thyroid nodules. She was noted to have right subcentimeter thyroid nodules on ultrasound in December 2019. She has been clinically and biochemically euthyroid. Her mother and sister have a history of thyroid nodules. No family history of thyroid cancer. \par Consider interval thyroid ultrasound in December 2021\par \par Expressible galactorrhea. Idiopathic. Prolactin has been normal. \par \par Return to see me in 3 months. \par \par CC:\par Dr. Jessie So, Fax [phone]

## 2020-02-24 ENCOUNTER — TRANSCRIPTION ENCOUNTER (OUTPATIENT)
Age: 40
End: 2020-02-24

## 2020-06-06 ENCOUNTER — TRANSCRIPTION ENCOUNTER (OUTPATIENT)
Age: 40
End: 2020-06-06

## 2020-08-17 ENCOUNTER — TRANSCRIPTION ENCOUNTER (OUTPATIENT)
Age: 40
End: 2020-08-17

## 2020-09-05 ENCOUNTER — EMERGENCY (EMERGENCY)
Facility: HOSPITAL | Age: 40
LOS: 1 days | Discharge: HOME | End: 2020-09-05
Attending: EMERGENCY MEDICINE | Admitting: EMERGENCY MEDICINE
Payer: COMMERCIAL

## 2020-09-05 VITALS
OXYGEN SATURATION: 98 % | DIASTOLIC BLOOD PRESSURE: 82 MMHG | TEMPERATURE: 98 F | HEART RATE: 64 BPM | SYSTOLIC BLOOD PRESSURE: 116 MMHG | RESPIRATION RATE: 16 BRPM

## 2020-09-05 VITALS
RESPIRATION RATE: 18 BRPM | SYSTOLIC BLOOD PRESSURE: 135 MMHG | WEIGHT: 223.11 LBS | DIASTOLIC BLOOD PRESSURE: 92 MMHG | TEMPERATURE: 99 F | HEIGHT: 65 IN | HEART RATE: 105 BPM | OXYGEN SATURATION: 100 %

## 2020-09-05 DIAGNOSIS — M54.6 PAIN IN THORACIC SPINE: ICD-10-CM

## 2020-09-05 DIAGNOSIS — Z98.89 OTHER SPECIFIED POSTPROCEDURAL STATES: Chronic | ICD-10-CM

## 2020-09-05 DIAGNOSIS — R07.89 OTHER CHEST PAIN: ICD-10-CM

## 2020-09-05 DIAGNOSIS — J45.909 UNSPECIFIED ASTHMA, UNCOMPLICATED: ICD-10-CM

## 2020-09-05 DIAGNOSIS — Z98.890 OTHER SPECIFIED POSTPROCEDURAL STATES: Chronic | ICD-10-CM

## 2020-09-05 DIAGNOSIS — Z88.6 ALLERGY STATUS TO ANALGESIC AGENT: ICD-10-CM

## 2020-09-05 DIAGNOSIS — Z79.899 OTHER LONG TERM (CURRENT) DRUG THERAPY: ICD-10-CM

## 2020-09-05 LAB
ALBUMIN SERPL ELPH-MCNC: 4.4 G/DL — SIGNIFICANT CHANGE UP (ref 3.3–5)
ALP SERPL-CCNC: 55 U/L — SIGNIFICANT CHANGE UP (ref 40–120)
ALT FLD-CCNC: 11 U/L — SIGNIFICANT CHANGE UP (ref 10–45)
ANION GAP SERPL CALC-SCNC: 12 MMOL/L — SIGNIFICANT CHANGE UP (ref 5–17)
AST SERPL-CCNC: 14 U/L — SIGNIFICANT CHANGE UP (ref 10–40)
BASOPHILS # BLD AUTO: 0.07 K/UL — SIGNIFICANT CHANGE UP (ref 0–0.2)
BASOPHILS NFR BLD AUTO: 1.4 % — SIGNIFICANT CHANGE UP (ref 0–2)
BILIRUB SERPL-MCNC: 0.3 MG/DL — SIGNIFICANT CHANGE UP (ref 0.2–1.2)
BUN SERPL-MCNC: 16 MG/DL — SIGNIFICANT CHANGE UP (ref 7–23)
CALCIUM SERPL-MCNC: 9.8 MG/DL — SIGNIFICANT CHANGE UP (ref 8.4–10.5)
CHLORIDE SERPL-SCNC: 108 MMOL/L — SIGNIFICANT CHANGE UP (ref 96–108)
CO2 SERPL-SCNC: 23 MMOL/L — SIGNIFICANT CHANGE UP (ref 22–31)
CREAT SERPL-MCNC: 1.01 MG/DL — SIGNIFICANT CHANGE UP (ref 0.5–1.3)
EOSINOPHIL # BLD AUTO: 0.13 K/UL — SIGNIFICANT CHANGE UP (ref 0–0.5)
EOSINOPHIL NFR BLD AUTO: 2.6 % — SIGNIFICANT CHANGE UP (ref 0–6)
GLUCOSE SERPL-MCNC: 104 MG/DL — HIGH (ref 70–99)
HCT VFR BLD CALC: 37.8 % — SIGNIFICANT CHANGE UP (ref 34.5–45)
HGB BLD-MCNC: 12 G/DL — SIGNIFICANT CHANGE UP (ref 11.5–15.5)
IMM GRANULOCYTES NFR BLD AUTO: 0.2 % — SIGNIFICANT CHANGE UP (ref 0–1.5)
LYMPHOCYTES # BLD AUTO: 1.84 K/UL — SIGNIFICANT CHANGE UP (ref 1–3.3)
LYMPHOCYTES # BLD AUTO: 37 % — SIGNIFICANT CHANGE UP (ref 13–44)
MCHC RBC-ENTMCNC: 27.7 PG — SIGNIFICANT CHANGE UP (ref 27–34)
MCHC RBC-ENTMCNC: 31.7 GM/DL — LOW (ref 32–36)
MCV RBC AUTO: 87.3 FL — SIGNIFICANT CHANGE UP (ref 80–100)
MONOCYTES # BLD AUTO: 0.26 K/UL — SIGNIFICANT CHANGE UP (ref 0–0.9)
MONOCYTES NFR BLD AUTO: 5.2 % — SIGNIFICANT CHANGE UP (ref 2–14)
NEUTROPHILS # BLD AUTO: 2.66 K/UL — SIGNIFICANT CHANGE UP (ref 1.8–7.4)
NEUTROPHILS NFR BLD AUTO: 53.6 % — SIGNIFICANT CHANGE UP (ref 43–77)
NRBC # BLD: 0 /100 WBCS — SIGNIFICANT CHANGE UP (ref 0–0)
PLATELET # BLD AUTO: 264 K/UL — SIGNIFICANT CHANGE UP (ref 150–400)
POTASSIUM SERPL-MCNC: 4.3 MMOL/L — SIGNIFICANT CHANGE UP (ref 3.5–5.3)
POTASSIUM SERPL-SCNC: 4.3 MMOL/L — SIGNIFICANT CHANGE UP (ref 3.5–5.3)
PROT SERPL-MCNC: 7.5 G/DL — SIGNIFICANT CHANGE UP (ref 6–8.3)
RBC # BLD: 4.33 M/UL — SIGNIFICANT CHANGE UP (ref 3.8–5.2)
RBC # FLD: 14.6 % — HIGH (ref 10.3–14.5)
SODIUM SERPL-SCNC: 143 MMOL/L — SIGNIFICANT CHANGE UP (ref 135–145)
TROPONIN T SERPL-MCNC: <0.01 NG/ML — SIGNIFICANT CHANGE UP (ref 0–0.01)
WBC # BLD: 4.97 K/UL — SIGNIFICANT CHANGE UP (ref 3.8–10.5)
WBC # FLD AUTO: 4.97 K/UL — SIGNIFICANT CHANGE UP (ref 3.8–10.5)

## 2020-09-05 PROCEDURE — 99284 EMERGENCY DEPT VISIT MOD MDM: CPT | Mod: 25

## 2020-09-05 PROCEDURE — 71275 CT ANGIOGRAPHY CHEST: CPT

## 2020-09-05 PROCEDURE — 84484 ASSAY OF TROPONIN QUANT: CPT

## 2020-09-05 PROCEDURE — 86769 SARS-COV-2 COVID-19 ANTIBODY: CPT

## 2020-09-05 PROCEDURE — 85730 THROMBOPLASTIN TIME PARTIAL: CPT

## 2020-09-05 PROCEDURE — 99284 EMERGENCY DEPT VISIT MOD MDM: CPT

## 2020-09-05 PROCEDURE — 85025 COMPLETE CBC W/AUTO DIFF WBC: CPT

## 2020-09-05 PROCEDURE — 80053 COMPREHEN METABOLIC PANEL: CPT

## 2020-09-05 PROCEDURE — 96374 THER/PROPH/DIAG INJ IV PUSH: CPT | Mod: XU

## 2020-09-05 PROCEDURE — 85610 PROTHROMBIN TIME: CPT

## 2020-09-05 PROCEDURE — 83690 ASSAY OF LIPASE: CPT

## 2020-09-05 PROCEDURE — 36415 COLL VENOUS BLD VENIPUNCTURE: CPT

## 2020-09-05 PROCEDURE — 71275 CT ANGIOGRAPHY CHEST: CPT | Mod: 26

## 2020-09-05 PROCEDURE — 96375 TX/PRO/DX INJ NEW DRUG ADDON: CPT | Mod: XU

## 2020-09-05 RX ORDER — KETOROLAC TROMETHAMINE 30 MG/ML
30 SYRINGE (ML) INJECTION ONCE
Refills: 0 | Status: DISCONTINUED | OUTPATIENT
Start: 2020-09-05 | End: 2020-09-05

## 2020-09-05 RX ORDER — MORPHINE SULFATE 50 MG/1
4 CAPSULE, EXTENDED RELEASE ORAL ONCE
Refills: 0 | Status: DISCONTINUED | OUTPATIENT
Start: 2020-09-05 | End: 2020-09-05

## 2020-09-05 RX ORDER — LIDOCAINE 4 G/100G
1 CREAM TOPICAL
Qty: 30 | Refills: 0
Start: 2020-09-05 | End: 2020-10-04

## 2020-09-05 RX ORDER — SODIUM CHLORIDE 9 MG/ML
1000 INJECTION INTRAMUSCULAR; INTRAVENOUS; SUBCUTANEOUS ONCE
Refills: 0 | Status: COMPLETED | OUTPATIENT
Start: 2020-09-05 | End: 2020-09-05

## 2020-09-05 RX ADMIN — MORPHINE SULFATE 4 MILLIGRAM(S): 50 CAPSULE, EXTENDED RELEASE ORAL at 12:29

## 2020-09-05 RX ADMIN — SODIUM CHLORIDE 1000 MILLILITER(S): 9 INJECTION INTRAMUSCULAR; INTRAVENOUS; SUBCUTANEOUS at 11:04

## 2020-09-05 RX ADMIN — Medication 30 MILLIGRAM(S): at 11:05

## 2020-09-05 RX ADMIN — Medication 30 MILLIGRAM(S): at 12:00

## 2020-09-05 NOTE — ED PROVIDER NOTE - MUSCULOSKELETAL, MLM
Spine appears normal, range of motion is not limited, no muscle or joint tenderness, no calf tenderness or swelling.

## 2020-09-05 NOTE — ED PROVIDER NOTE - CARDIAC, MLM
chest pain is non reproducible. Normal rate, regular rhythm.  Heart sounds S1, S2.  No murmurs, rubs or gallops.

## 2020-09-05 NOTE — ED ADULT NURSE NOTE - OBJECTIVE STATEMENT
41 y/o female presents to ED with c/o L upper back pain radiating to front of chest worse with deep inspiration starting approx 5 days ago. Pt reports no associated symptoms (no SOB/dizziness/nv), endorses recent injury to L side neck ~8/17 treated with flexeril/prednisone/tylenol. Pt states she could not sleep yesterday due to the pain and took flexeril with no relief. ROM L arm intact. Denies cough/fever.

## 2020-09-05 NOTE — ED PROVIDER NOTE - CLINICAL SUMMARY MEDICAL DECISION MAKING FREE TEXT BOX
Patient is 40 year old female presenting with chest and back pain pleuritic in nature. Concern for PE. Labs are normal, obtained CTA which did not show PE. Likely musculoskeletal pain as non reproducible. Advised to continue on muscle relaxers, and gave lidocaine patch as she cannot take NSAIDs due to h/o or gastritis.

## 2020-09-05 NOTE — ED PROVIDER NOTE - OBJECTIVE STATEMENT
Patient is a 40 year old female with PMH of gastritis and asthma, no medications who presents today with midback pain and chest pain midsternal and mostly pleuritic. Denies fever or chills, cough, and no smoking history. She originally thought it was a muscle strain, but she got nervous due to FHx of blood clots. Notes she has been dieting well and exercising. She went to urgent care who gave her muscle relaxers with no real relief. Patient is not on birth control and has no recent travels.

## 2020-09-05 NOTE — ED ADULT TRIAGE NOTE - CHIEF COMPLAINT QUOTE
c/o upper back pain radiating to mid sternal that started a few days ago and got worse yesterday. Pain is described as constant sharp pain and increases with deep breath

## 2020-09-05 NOTE — ED PROVIDER NOTE - PATIENT PORTAL LINK FT
You can access the FollowMyHealth Patient Portal offered by [facility] by registering at the following website: [email or web address]. By joining [employer]’s FollowMyHealth portal, you will also be able to view your health information using other applications (apps) compatible with our system.

## 2020-09-06 LAB
SARS-COV-2 IGG SERPL QL IA: POSITIVE
SARS-COV-2 IGM SERPL IA-ACNC: 40.8 INDEX — HIGH

## 2020-11-12 ENCOUNTER — TRANSCRIPTION ENCOUNTER (OUTPATIENT)
Age: 40
End: 2020-11-12

## 2020-12-08 NOTE — ED PROVIDER NOTE - NSTIMEPROVIDERCAREINITIATE_GEN_ER
Clinic hours for Dr. Garcia:  Monday 7 am - 5 pm  Tuesday 7 am - 5 pm  Wednesday 7 am - 5 pm  Thursday 7 am - 5 pm  Friday 7 am - 4 pm    If you need a refill on your prescription please call your pharmacy and let them know. Please be proactive and call before your medication runs out. The pharmacy will then contact us for the refill. Please allow 24-48 hours for the refill to be processed.     If your physician has ordered additional laboratory or radiology testing as part of your ongoing plan of care, please allow 7-10 business days from the day of your lab draw or test for the results to be sent and reviewed by your provider. If your results are critical and require more immediate intervention, you will be contacted sooner. Your results will be conveyed to you via a phone call or letter.    You may be receiving a patient satisfaction survey in the mail. Please take the time to complete, as your feedback is very important to us. We strive to make your experience exceptional and your comments help us with that goal. We look forward to hearing from you.      
23-Aug-2017 00:17

## 2020-12-16 PROBLEM — Z86.69 HISTORY OF BACTERIAL CONJUNCTIVITIS: Status: RESOLVED | Noted: 2018-01-22 | Resolved: 2020-12-16

## 2021-01-07 ENCOUNTER — TRANSCRIPTION ENCOUNTER (OUTPATIENT)
Age: 41
End: 2021-01-07

## 2021-01-21 ENCOUNTER — APPOINTMENT (OUTPATIENT)
Dept: ENDOCRINOLOGY | Facility: CLINIC | Age: 41
End: 2021-01-21
Payer: COMMERCIAL

## 2021-01-21 VITALS
SYSTOLIC BLOOD PRESSURE: 133 MMHG | DIASTOLIC BLOOD PRESSURE: 87 MMHG | HEART RATE: 81 BPM | HEIGHT: 65 IN | WEIGHT: 236 LBS | BODY MASS INDEX: 39.32 KG/M2

## 2021-01-21 PROCEDURE — 99072 ADDL SUPL MATRL&STAF TM PHE: CPT

## 2021-01-21 PROCEDURE — 99214 OFFICE O/P EST MOD 30 MIN: CPT

## 2021-01-22 LAB
25(OH)D3 SERPL-MCNC: 59.5 NG/ML
ALBUMIN SERPL ELPH-MCNC: 4.3 G/DL
ALP BLD-CCNC: 60 U/L
ALT SERPL-CCNC: 13 U/L
ANION GAP SERPL CALC-SCNC: 17 MMOL/L
AST SERPL-CCNC: 15 U/L
BASOPHILS # BLD AUTO: 0.03 K/UL
BASOPHILS NFR BLD AUTO: 0.7 %
BILIRUB SERPL-MCNC: 0.2 MG/DL
BUN SERPL-MCNC: 19 MG/DL
CALCIUM SERPL-MCNC: 9.7 MG/DL
CHLORIDE SERPL-SCNC: 109 MMOL/L
CHOLEST SERPL-MCNC: 192 MG/DL
CO2 SERPL-SCNC: 18 MMOL/L
CREAT SERPL-MCNC: 1.05 MG/DL
EOSINOPHIL # BLD AUTO: 0.05 K/UL
EOSINOPHIL NFR BLD AUTO: 1.2 %
ESTIMATED AVERAGE GLUCOSE: 103 MG/DL
GLUCOSE SERPL-MCNC: 92 MG/DL
HBA1C MFR BLD HPLC: 5.2 %
HCT VFR BLD CALC: 39.6 %
HDLC SERPL-MCNC: 88 MG/DL
HGB BLD-MCNC: 12.4 G/DL
IMM GRANULOCYTES NFR BLD AUTO: 0 %
LDLC SERPL CALC-MCNC: 93 MG/DL
LYMPHOCYTES # BLD AUTO: 1.97 K/UL
LYMPHOCYTES NFR BLD AUTO: 47.9 %
MAN DIFF?: NORMAL
MCHC RBC-ENTMCNC: 27.5 PG
MCHC RBC-ENTMCNC: 31.3 GM/DL
MCV RBC AUTO: 87.8 FL
MONOCYTES # BLD AUTO: 0.24 K/UL
MONOCYTES NFR BLD AUTO: 5.8 %
NEUTROPHILS # BLD AUTO: 1.82 K/UL
NEUTROPHILS NFR BLD AUTO: 44.4 %
NONHDLC SERPL-MCNC: 104 MG/DL
PLATELET # BLD AUTO: 292 K/UL
POTASSIUM SERPL-SCNC: 4.7 MMOL/L
PROT SERPL-MCNC: 7.5 G/DL
RBC # BLD: 4.51 M/UL
RBC # FLD: 14.2 %
SODIUM SERPL-SCNC: 144 MMOL/L
TRIGL SERPL-MCNC: 54 MG/DL
TSH SERPL-ACNC: 0.98 UIU/ML
VIT B12 SERPL-MCNC: >2000 PG/ML
WBC # FLD AUTO: 4.11 K/UL

## 2021-01-22 NOTE — ADDENDUM
[FreeTextEntry1] : Recent laboratory results as below; discussed with Ms. Mittal. Carbon dioxide a little low, perhaps due to topiramate therapy. HbA1c within range. Lipid panel within range. Vitamin B12 elevated and recommended decrease in supplementation. Other test results within range. 1/22/21

## 2021-01-22 NOTE — PHYSICAL EXAM
[Alert] : alert [Healthy Appearance] : healthy appearance [No Acute Distress] : no acute distress [Normal Sclera/Conjunctiva] : normal sclera/conjunctiva [Normal Hearing] : hearing was normal [No Neck Mass] : no neck mass was observed [No LAD] : no lymphadenopathy [Supple] : the neck was supple [Thyroid Not Enlarged] : the thyroid was not enlarged [No Respiratory Distress] : no respiratory distress [No Stigmata of Cushings Syndrome] : no stigmata of Cushings Syndrome [Normal Gait] : normal gait [Normal Insight/Judgement] : insight and judgment were intact [Kyphosis] : no kyphosis present [Acanthosis Nigricans] : no acanthosis nigricans [de-identified] : no moon facies, no supraclavicular fat pads

## 2021-01-22 NOTE — HISTORY OF PRESENT ILLNESS
[FreeTextEntry1] : Ms. Mittal is a 40 year-old woman with a history of migraine headaches, elevated body mass index presenting for follow-up of elevated body mass index. I saw her for an initial visit in April 2019 and last in January 2020.\par \par Elevated body mass index. Comorbidity of gastroesophageal reflux disease. \par Her young adult weight was around 200 pounds. Her highest weight was 307 pounds. \par She is status post laparoscopic banding in 2011 and gastric sleeve in 2014.\par Her roya weight after her second surgical procedure was 196 pounds. She had weight gain she attributed to uterine artery embolization. \par She had never tried any medications for weight loss. We started topiramate up to 50 mg twice daily in April 2019 and phentermine 15 mg daily in December 2019.\par \par Thyroid nodules.\par She was noted to have right subcentimeter thyroid nodules on ultrasound in December 2019. \par She has been clinically and biochemically euthyroid. \par Her mother and sister have a history of thyroid nodules. No family history of thyroid cancer. \par \par Galactorrhea.\par She had expressible galactorrhea since the birth of her last child over 18 years ago; recently resolved.\par Prolactin levels have been normal. \par She has annual mammogram testing through her gynecologist. \par Menstrual periods have been regular.\par \par Interim History \par She was in the emergency department in September due to back pain; improved.\par She believes weight is up 5 pounds in the past few weeks. She states her appetite has been increasing. She has been exercising less.\par She has been working from home. \par She had a recent death in the family. \par She had COVID-19 infection in March. \par Weight is overall down 8 pounds since last visit; weight is overall down 24 pounds since her initial visit here. Migraine headaches are less frequent with topiramate. \par Medical and surgical history, medications, allergies, social and family history reviewed and updated as needed.

## 2021-01-22 NOTE — ASSESSMENT
[FreeTextEntry1] : Elevated body mass index. Comorbidity of gastroesophageal reflux disease. She is status post laparoscopic banding in 2011 and gastric sleeve in 2014. I congratulated her on her weight loss. We reviewed lifestyle modifications for weight loss. We discussed follow-up nutrition. We discussed pharmacologic options for weight loss. She is tolerating topiramate and phentermine and we can continue; we can adjust phentermine to improve appetite suppression. \par Check complete blood count, comprehensive metabolic panel, lipid panel, urine microalbumin, TSH, vitamin B12, 25-hydroxyvitamin D \par Lifestyle modification\par Follow-up with nutrition\par Continue topiramate 50 mg twice daily\par Adjust phentermine to 30 mg daily\par \par Thyroid nodules. She was noted to have right subcentimeter thyroid nodules on ultrasound in December 2019. She has been clinically and biochemically euthyroid. Her mother and sister have a history of thyroid nodules. No family history of thyroid cancer. \par Consider interval thyroid ultrasound in December 2021\par \par Expressible galactorrhea. Resolved. Prolactin levels had been normal.\par \par Return to see me in 4 months. \par \par CC:\par Dr. Kati Davis, Fax [phone]

## 2021-03-16 ENCOUNTER — NON-APPOINTMENT (OUTPATIENT)
Age: 41
End: 2021-03-16

## 2021-03-17 ENCOUNTER — RX RENEWAL (OUTPATIENT)
Age: 41
End: 2021-03-17

## 2021-04-01 ENCOUNTER — TRANSCRIPTION ENCOUNTER (OUTPATIENT)
Age: 41
End: 2021-04-01

## 2021-05-07 ENCOUNTER — EMERGENCY (EMERGENCY)
Facility: HOSPITAL | Age: 41
LOS: 1 days | Discharge: HOME | End: 2021-05-07
Attending: EMERGENCY MEDICINE | Admitting: EMERGENCY MEDICINE
Payer: COMMERCIAL

## 2021-05-07 VITALS
HEART RATE: 99 BPM | TEMPERATURE: 99 F | RESPIRATION RATE: 18 BRPM | DIASTOLIC BLOOD PRESSURE: 87 MMHG | OXYGEN SATURATION: 100 % | SYSTOLIC BLOOD PRESSURE: 142 MMHG | WEIGHT: 240.08 LBS | HEIGHT: 65 IN

## 2021-05-07 DIAGNOSIS — Z98.89 OTHER SPECIFIED POSTPROCEDURAL STATES: Chronic | ICD-10-CM

## 2021-05-07 DIAGNOSIS — R51.9 HEADACHE, UNSPECIFIED: ICD-10-CM

## 2021-05-07 DIAGNOSIS — Z79.899 OTHER LONG TERM (CURRENT) DRUG THERAPY: ICD-10-CM

## 2021-05-07 DIAGNOSIS — Z88.6 ALLERGY STATUS TO ANALGESIC AGENT: ICD-10-CM

## 2021-05-07 DIAGNOSIS — Z98.890 OTHER SPECIFIED POSTPROCEDURAL STATES: Chronic | ICD-10-CM

## 2021-05-07 LAB
BASOPHILS # BLD AUTO: 0.05 K/UL — SIGNIFICANT CHANGE UP (ref 0–0.2)
BASOPHILS NFR BLD AUTO: 0.9 % — SIGNIFICANT CHANGE UP (ref 0–2)
EOSINOPHIL # BLD AUTO: 0.1 K/UL — SIGNIFICANT CHANGE UP (ref 0–0.5)
EOSINOPHIL NFR BLD AUTO: 1.8 % — SIGNIFICANT CHANGE UP (ref 0–6)
HCT VFR BLD CALC: 37 % — SIGNIFICANT CHANGE UP (ref 34.5–45)
HGB BLD-MCNC: 11.8 G/DL — SIGNIFICANT CHANGE UP (ref 11.5–15.5)
IMM GRANULOCYTES NFR BLD AUTO: 0.2 % — SIGNIFICANT CHANGE UP (ref 0–1.5)
LYMPHOCYTES # BLD AUTO: 2.05 K/UL — SIGNIFICANT CHANGE UP (ref 1–3.3)
LYMPHOCYTES # BLD AUTO: 36.2 % — SIGNIFICANT CHANGE UP (ref 13–44)
MCHC RBC-ENTMCNC: 27 PG — SIGNIFICANT CHANGE UP (ref 27–34)
MCHC RBC-ENTMCNC: 31.9 GM/DL — LOW (ref 32–36)
MCV RBC AUTO: 84.7 FL — SIGNIFICANT CHANGE UP (ref 80–100)
MONOCYTES # BLD AUTO: 0.37 K/UL — SIGNIFICANT CHANGE UP (ref 0–0.9)
MONOCYTES NFR BLD AUTO: 6.5 % — SIGNIFICANT CHANGE UP (ref 2–14)
NEUTROPHILS # BLD AUTO: 3.08 K/UL — SIGNIFICANT CHANGE UP (ref 1.8–7.4)
NEUTROPHILS NFR BLD AUTO: 54.4 % — SIGNIFICANT CHANGE UP (ref 43–77)
NRBC # BLD: 0 /100 WBCS — SIGNIFICANT CHANGE UP (ref 0–0)
PLATELET # BLD AUTO: 258 K/UL — SIGNIFICANT CHANGE UP (ref 150–400)
RBC # BLD: 4.37 M/UL — SIGNIFICANT CHANGE UP (ref 3.8–5.2)
RBC # FLD: 14.8 % — HIGH (ref 10.3–14.5)
WBC # BLD: 5.66 K/UL — SIGNIFICANT CHANGE UP (ref 3.8–10.5)
WBC # FLD AUTO: 5.66 K/UL — SIGNIFICANT CHANGE UP (ref 3.8–10.5)

## 2021-05-07 PROCEDURE — 70450 CT HEAD/BRAIN W/O DYE: CPT

## 2021-05-07 PROCEDURE — G1004: CPT

## 2021-05-07 PROCEDURE — 80053 COMPREHEN METABOLIC PANEL: CPT

## 2021-05-07 PROCEDURE — 70496 CT ANGIOGRAPHY HEAD: CPT

## 2021-05-07 PROCEDURE — 99284 EMERGENCY DEPT VISIT MOD MDM: CPT | Mod: 25

## 2021-05-07 PROCEDURE — 70496 CT ANGIOGRAPHY HEAD: CPT | Mod: 26,MG

## 2021-05-07 PROCEDURE — 85025 COMPLETE CBC W/AUTO DIFF WBC: CPT

## 2021-05-07 PROCEDURE — 36415 COLL VENOUS BLD VENIPUNCTURE: CPT

## 2021-05-07 PROCEDURE — 96374 THER/PROPH/DIAG INJ IV PUSH: CPT | Mod: XU

## 2021-05-07 PROCEDURE — 99285 EMERGENCY DEPT VISIT HI MDM: CPT

## 2021-05-07 PROCEDURE — 70450 CT HEAD/BRAIN W/O DYE: CPT | Mod: 26,MG,59

## 2021-05-07 PROCEDURE — 96375 TX/PRO/DX INJ NEW DRUG ADDON: CPT

## 2021-05-07 RX ORDER — SODIUM CHLORIDE 9 MG/ML
1000 INJECTION INTRAMUSCULAR; INTRAVENOUS; SUBCUTANEOUS ONCE
Refills: 0 | Status: COMPLETED | OUTPATIENT
Start: 2021-05-07 | End: 2021-05-07

## 2021-05-07 RX ORDER — KETOROLAC TROMETHAMINE 30 MG/ML
30 SYRINGE (ML) INJECTION ONCE
Refills: 0 | Status: DISCONTINUED | OUTPATIENT
Start: 2021-05-07 | End: 2021-05-07

## 2021-05-07 RX ORDER — DIPHENHYDRAMINE HCL 50 MG
25 CAPSULE ORAL ONCE
Refills: 0 | Status: COMPLETED | OUTPATIENT
Start: 2021-05-07 | End: 2021-05-07

## 2021-05-07 RX ORDER — METOCLOPRAMIDE HCL 10 MG
10 TABLET ORAL ONCE
Refills: 0 | Status: COMPLETED | OUTPATIENT
Start: 2021-05-07 | End: 2021-05-07

## 2021-05-07 RX ADMIN — Medication 30 MILLIGRAM(S): at 10:14

## 2021-05-07 RX ADMIN — Medication 104 MILLIGRAM(S): at 10:14

## 2021-05-07 RX ADMIN — SODIUM CHLORIDE 1000 MILLILITER(S): 9 INJECTION INTRAMUSCULAR; INTRAVENOUS; SUBCUTANEOUS at 09:44

## 2021-05-07 RX ADMIN — Medication 25 MILLIGRAM(S): at 10:14

## 2021-05-07 NOTE — ED ADULT TRIAGE NOTE - SPO2 (%)
100 Graft Donor Site Bandage (Optional-Leave Blank If You Don't Want In Note): Steri-strips and a pressure bandage were applied to the donor site.

## 2021-05-07 NOTE — ED PROVIDER NOTE - PATIENT PORTAL LINK FT
You can access the FollowMyHealth Patient Portal offered by [facility] by registering at the following website: [email or web address]. By joining [facility]’s FollowMyHealth portal, you will also be able to view your health information using other applications (apps) compatible with our system.

## 2021-05-07 NOTE — ED PROVIDER NOTE - CLINICAL SUMMARY MEDICAL DECISION MAKING FREE TEXT BOX
pt c/o frontal h/a x 1 wk, states pain relief w/tyl at home, but daily h/a, hx of migraines but this feels dif, concerned about it due to recent j&j vaccine, not worst h/a of life, not thunderclap, no meningeal signs, pt well appearing, non toxic, non focal neuro exam, labs wnl, ? tension vs migraine variant, ct and ct venogram neg for acute process (plain ct results discussed and understands to f/u w/neuro for outpatient mri), given toradol/reglan/benadryl in ed w/resolution of ha, pt understands and agrees w/plan, strict return precautions given

## 2021-05-07 NOTE — ED PROVIDER NOTE - OBJECTIVE STATEMENT
The pt is a 39 y/o F, who presents to ED c/o h/a x 1 wk. Pt w/hx of migraine h/a, states that this feels dif - pain to front of head, constant, throbbing, 7/10, min relief w/tyl. Had j&j vaccine a mon ago. Denies fevers, chills, visual changes, dizziness, syncope, hearing loss, n/v, neck pain, falls, head trauma.

## 2021-05-07 NOTE — ED PROVIDER NOTE - ENMT, MLM
Airway patent, Nasal mucosa clear. Mouth with normal mucosa. Throat has no vesicles, no oropharyngeal exudates and uvula is midline. Ears: TMs pearly gray b/l, no cervical lymphadenopathy b/l

## 2021-05-07 NOTE — ED PROVIDER NOTE - ATTENDING CONTRIBUTION TO CARE
R frontal HA, x5d, gradual onset, relief w/ tylenol but mostly constant. Different location/quality than usual migraines. No other systemic symptoms. Very concerned about clot bec she recently had COVID vaccine.  Denies vision changes, focal weakness/numbness, vertigo, neck pain, rashes, tinnitus, hearing changes, URI symptoms, f/c, SOB/CP, NVD, abd pain, urinary complaints, black/bloody stool, lifestyle/dietary/medication changes.   Vitals wnl, exam as above.  CT venogram, basic labs wnl.   Given fluid/meds.  Now feeling much better.   Clinically benign HA.  Clinically no indication for further emergent ED workup or hospitalization at this time. Comfortable for dc, outpt f/u.

## 2021-05-07 NOTE — ED PROVIDER NOTE - CARE PROVIDERS DIRECT ADDRESSES
[email or web address]

## 2021-05-07 NOTE — ED ADULT NURSE NOTE - NSIMPLEMENTINTERV_GEN_ALL_ED
Implemented All Universal Safety Interventions:  [place] to call system. Call bell, personal items and telephone within reach. Instruct patient to call for assistance. Room bathroom lighting operational. Non-slip footwear when patient is off stretcher. Physically safe environment: no spills, clutter or unnecessary equipment. Stretcher in lowest position, wheels locked, appropriate side rails in place.

## 2021-05-07 NOTE — ED ADULT TRIAGE NOTE - NS ED TRIAGE AVPU SCALE
Left displaced femoral neck fracture  03/25/2018    Active  Herve Chamorro
Alert-The patient is alert, awake and responds to voice. The patient is oriented to time, place, and person. The triage nurse is able to obtain subjective information.

## 2021-05-07 NOTE — ED PROVIDER NOTE - NSFOLLOWUPINSTRUCTIONS_ED_ALL_ED_FT
FOLLOW UP WITH A NEUROLOGIST FOR EVALUATION AND FURTHER TREATMENT    Headache    A headache is pain or discomfort felt around the head or neck area. The specific cause of a headache may not be found as there are many types including tension headaches, migraine headaches, and cluster headaches. Watch your condition for any changes. Things you can do to manage your pain include taking over the counter and prescription medications as instructed by your health care provider, lying down in a dark quiet room, limiting stress, getting regular sleep, and refraining from alcohol and tobacco products.    SEEK IMMEDIATE MEDICAL CARE IF YOU HAVE ANY OF THE FOLLOWING SYMPTOMS: fever, vomiting, stiff neck, loss of vision, problems with speech, muscle weakness, loss of balance, trouble walking, passing out, or confusion.

## 2021-05-07 NOTE — ED PROVIDER NOTE - CARE PROVIDER_API CALL
Delvin Chaudhry)  Neurology; Neuromuscular Medicine  [address], [address] [place] [address]  Phone: [phone]  Fax: [phone]  Follow Up Time:     Monika Gibbs)  Neurology  [address] [place] [address], [facility] [address]  [place] [address]  Phone: [phone]  Fax: [phone]  Follow Up Time:

## 2021-05-07 NOTE — ED ADULT NURSE NOTE - CHIEF COMPLAINT QUOTE
41 y/o female came in c/o headache for 5 days. Pt reports hx of migraines. Pt reports taking JJ vaccine 4/2/21.

## 2021-05-26 ENCOUNTER — APPOINTMENT (OUTPATIENT)
Dept: ENDOCRINOLOGY | Facility: CLINIC | Age: 41
End: 2021-05-26

## 2021-05-26 ENCOUNTER — APPOINTMENT (OUTPATIENT)
Dept: ENDOCRINOLOGY | Facility: CLINIC | Age: 41
End: 2021-05-26
Payer: COMMERCIAL

## 2021-05-26 VITALS
WEIGHT: 247 LBS | DIASTOLIC BLOOD PRESSURE: 91 MMHG | BODY MASS INDEX: 41.1 KG/M2 | SYSTOLIC BLOOD PRESSURE: 137 MMHG | HEART RATE: 84 BPM

## 2021-05-26 PROCEDURE — 99214 OFFICE O/P EST MOD 30 MIN: CPT

## 2021-05-26 RX ORDER — PHENTERMINE HYDROCHLORIDE 30 MG/1
30 CAPSULE ORAL
Qty: 90 | Refills: 0 | Status: DISCONTINUED | COMMUNITY
Start: 2019-12-09 | End: 2021-05-26

## 2021-05-26 RX ORDER — LIRAGLUTIDE 6 MG/ML
18 INJECTION, SOLUTION SUBCUTANEOUS
Qty: 3 | Refills: 0 | Status: COMPLETED | OUTPATIENT
Start: 2021-05-26 | End: 2021-06-25

## 2021-05-26 NOTE — ASSESSMENT
[FreeTextEntry1] : Elevated body mass index. Comorbidity of gastroesophageal reflux disease. She is status post laparoscopic banding in 2011 and gastric sleeve in 2014. We reviewed lifestyle modifications for weight loss. We discussed follow-up nutrition. We discussed pharmacologic options for weight loss. She is tolerating topiramate which is also improving migraine headache frequency and we can continue. She is amenable to a trial of a GLP-1 receptor agonist. We discussed the risks and benefits of the GLP-1 receptor agonist class, including but not limited to nausea, pancreatitis, medullary thyroid cancer. She has a history of a single episode of pancreatitis at age 33 in the setting of alcohol use. We reviewed subcutaneous injection technique, storage, and sharps disposal. She successfully administered a dose of Saxenda in the office. \par Lifestyle modification\par Follow-up with nutrition\par Continue topiramate 50 mg twice daily\par Stop phentermine and start Saxenda 0.6 mg SC daily for one week up to 1.8 mg daily as tolerated if covered by insurance (sample given)\par \par Thyroid nodules. She was noted to have right subcentimeter thyroid nodules on ultrasound in December 2019. She has been clinically and biochemically euthyroid. Her mother and sister have a history of thyroid nodules. No family history of thyroid cancer. \par Consider interval thyroid ultrasound in December 2021\par \par Expressible galactorrhea. Resolved. Prolactin levels had been normal.\par \par Return to see me in 4 months. \par \par CC:\par Dr. Kati Davis, Fax [phone]

## 2021-05-26 NOTE — PHYSICAL EXAM
[Alert] : alert [Healthy Appearance] : healthy appearance [No Acute Distress] : no acute distress [Normal Sclera/Conjunctiva] : normal sclera/conjunctiva [Normal Hearing] : hearing was normal [No Respiratory Distress] : no respiratory distress [No Stigmata of Cushings Syndrome] : no stigmata of Cushings Syndrome [Normal Gait] : normal gait [Normal Insight/Judgement] : insight and judgment were intact [Kyphosis] : no kyphosis present [Acanthosis Nigricans] : no acanthosis nigricans [de-identified] : no moon facies, no supraclavicular fat pads

## 2021-05-26 NOTE — HISTORY OF PRESENT ILLNESS
[FreeTextEntry1] : Ms. Mittal is a 40 year-old woman with a history of migraine headaches, elevated body mass index presenting for follow-up of elevated body mass index. I saw her for an initial visit in April 2019 and last in January 2021.\par \par Elevated body mass index. Comorbidity of gastroesophageal reflux disease. \par Her young adult weight was around 200 pounds. Her highest weight was 307 pounds. \par She is status post laparoscopic banding in 2011 and gastric sleeve in 2014.\par Her roya weight after her second surgical procedure was 196 pounds. She had weight gain she attributed to uterine artery embolization. \par She had never tried any medications for weight loss. We started topiramate up to 50 mg twice daily in April 2019 and phentermine 15 mg daily in December 2019. We adjusted phentermine to 30 mg daily in January 2021.\par \par Thyroid nodules.\par She was noted to have right subcentimeter thyroid nodules on ultrasound in December 2019. \par She has been clinically and biochemically euthyroid. \par Her mother and sister have a history of thyroid nodules. No family history of thyroid cancer. \par \par Galactorrhea.\par She had expressible galactorrhea since the birth of her last child over 18 years ago; recently resolved.\par Prolactin levels have been normal. \par She has annual mammogram testing through her gynecologist. \par Menstrual periods have been regular.\par \par Interim History \par Laboratory results from last visit as below. Carbon dioxide a little low, perhaps due to topiramate therapy. HbA1c within range. Lipid panel within range. Vitamin B12 elevated and recommended decrease in supplementation. Other test results within range. \par In January 2021 we continued topiramate 50 mg twice daily and adjusted phentermine to 30 mg daily. She feels therapy has been ineffective. \par She has been working from home and goes back in September. \par She received the [employer] COVID-19 vaccine.\par Weight is up 11 pounds since last visit; weight was overall down 24 pounds since her initial visit here. Migraine headaches are less frequent with topiramate. \par Medical and surgical history, medications, allergies, social and family history reviewed and updated as needed.

## 2021-06-01 ENCOUNTER — TRANSCRIPTION ENCOUNTER (OUTPATIENT)
Age: 41
End: 2021-06-01

## 2021-06-02 ENCOUNTER — TRANSCRIPTION ENCOUNTER (OUTPATIENT)
Age: 41
End: 2021-06-02

## 2021-06-07 ENCOUNTER — APPOINTMENT (OUTPATIENT)
Dept: NEUROLOGY | Facility: CLINIC | Age: 41
End: 2021-06-07

## 2021-06-07 RX ORDER — LIRAGLUTIDE 6 MG/ML
18 INJECTION, SOLUTION SUBCUTANEOUS
Qty: 3 | Refills: 0 | Status: COMPLETED | OUTPATIENT
Start: 2021-06-07 | End: 2021-07-07

## 2021-06-10 ENCOUNTER — APPOINTMENT (OUTPATIENT)
Dept: NEUROLOGY | Facility: CLINIC | Age: 41
End: 2021-06-10
Payer: COMMERCIAL

## 2021-06-10 VITALS
OXYGEN SATURATION: 97 % | HEIGHT: 65 IN | HEART RATE: 93 BPM | WEIGHT: 243 LBS | TEMPERATURE: 98.3 F | BODY MASS INDEX: 40.48 KG/M2 | SYSTOLIC BLOOD PRESSURE: 113 MMHG | DIASTOLIC BLOOD PRESSURE: 79 MMHG

## 2021-06-10 DIAGNOSIS — R93.0 ABNORMAL FINDINGS ON DIAGNOSTIC IMAGING OF SKULL AND HEAD, NOT ELSEWHERE CLASSIFIED: ICD-10-CM

## 2021-06-10 DIAGNOSIS — M79.2 NEURALGIA AND NEURITIS, UNSPECIFIED: ICD-10-CM

## 2021-06-10 PROCEDURE — 99204 OFFICE O/P NEW MOD 45 MIN: CPT

## 2021-06-10 NOTE — CONSULT LETTER
[Dear  ___] : Dear ~MUNDO, [Consult Letter:] : I had the pleasure of evaluating your patient, [unfilled]. [Please see my note below.] : Please see my note below. [Consult Closing:] : Thank you very much for allowing me to participate in the care of this patient.  If you have any questions, please do not hesitate to contact me. [Sincerely,] : Sincerely, [FreeTextEntry3] : Damon Sher MD MSc\par Vice-Chair, Neurology

## 2021-06-10 NOTE — DISCUSSION/SUMMARY
[FreeTextEntry1] : Impression:\par 1) CT findings of probable extra-axial left temporal pole area, possible cyst vs meningioma?\par 2) left arm intermittent sensory loss x 15min, tingling or numbness without mechanical provocation.  \par 3) sister with Neuromyelitis optica, and some concern of the above.\par 4) migraine - on topiramate and sumatriptan, with 7-8 headache d/month, often PMS related.\par \par Plan:\par 1) MRI Head and C spine.\par 2) consider addition of nurtec vs other tripan - riza

## 2021-06-10 NOTE — PHYSICAL EXAM
[FreeTextEntry1] : General:\par Constitutional:  Sitting comfortably in NAD.\par Psychiatric: well-groomed, appropriate affect, insight/judgment intact\par Ears, Nose, Throat: no abnormalities, mucus membranes moist\par Mallampati: 3/4\par Neck: supple, no lymphadenopathy or nodules palpable\par Neck Circumference:\par Cardiovascular: regular rate and rhythm, normal S1/S2, no murmurs \par Chest: Clear to bases. 	Abdomen: soft, non-tender\par Extremities: no edema, clubbing or cyanosis\par Skin:  no rash or neurocutaneous signs \par \par Cognitive:\par Orientation, language, memory and knowledge screens intact.\par Cranial Nerves:\par II: Full to confrontation; disc margins sharp. III/IV/VI: PERRL EOMF No nystagmus\par V1V2V3: Symmetric, VII: Face appears symmetric VIII: Normal to screening, IX/X: Palate Elevates Symmetrical  XI: Trapezius Symmetric  XII: Tongue midline\par Motor:\par Power: 5/5 throughout, tone: normal x 4 limbs, no tremor \par Sensation:\par Intact to light touch. \par \par Coordination/Gait:\par Finger-nose-finger intact, normal rapid-alternating movements.\par Fine motor normal with normal rapid finger taps and heel-toe tapping \par Narrow based gait, tandem forward ok\par Rhomberg negative\par Reflexes:\par DTR: 1-2+ symmetric x 4 limbs;\par

## 2021-06-10 NOTE — HISTORY OF PRESENT ILLNESS
[FreeTextEntry1] : Glenda is a 41 yo RHF with a history of migraine, but presented to the [facility] ER after a JnJ covid vaccine shot with a new type of headache.  The shot was April 22nd.  \par \par \par Migraine left temporal and eye.\par \par New H/A:  daily for 1 week, felt in the center of the head, refractory to sumatriptan 100mg.  Also on TPM.  It improved 2-3 days after the ER visit.\par \par In the ER there, CT head showed a possible lesion, and presented today for evaluation.\par \par Her sister was diagnosed with NMO with multiple lesions.\par She herself has sense of numbness into the left hand.  no symptoms into the legs.\par Vision 'is terrible'. \par \par She reports having had a terrible memory for years.  Loses train of though frequently.\par

## 2021-06-14 ENCOUNTER — TRANSCRIPTION ENCOUNTER (OUTPATIENT)
Age: 41
End: 2021-06-14

## 2021-06-17 ENCOUNTER — TRANSCRIPTION ENCOUNTER (OUTPATIENT)
Age: 41
End: 2021-06-17

## 2021-07-02 ENCOUNTER — TRANSCRIPTION ENCOUNTER (OUTPATIENT)
Age: 41
End: 2021-07-02

## 2021-07-13 ENCOUNTER — TRANSCRIPTION ENCOUNTER (OUTPATIENT)
Age: 41
End: 2021-07-13

## 2021-07-14 ENCOUNTER — TRANSCRIPTION ENCOUNTER (OUTPATIENT)
Age: 41
End: 2021-07-14

## 2021-07-15 ENCOUNTER — TRANSCRIPTION ENCOUNTER (OUTPATIENT)
Age: 41
End: 2021-07-15

## 2021-08-07 NOTE — ED ADULT TRIAGE NOTE - PATIENT ON (OXYGEN DELIVERY METHOD)
room air
You can access the FollowMyHealth Patient Portal offered by [facility] by registering at the following website: [email or web address]. By joining [employer]’s FollowMyHealth portal, you will also be able to view your health information using other applications (apps) compatible with our system.

## 2021-09-17 ENCOUNTER — RX RENEWAL (OUTPATIENT)
Age: 41
End: 2021-09-17

## 2021-10-01 ENCOUNTER — APPOINTMENT (OUTPATIENT)
Dept: ENDOCRINOLOGY | Facility: CLINIC | Age: 41
End: 2021-10-01

## 2021-10-28 ENCOUNTER — LABORATORY RESULT (OUTPATIENT)
Age: 41
End: 2021-10-28

## 2021-10-28 ENCOUNTER — APPOINTMENT (OUTPATIENT)
Dept: NEUROLOGY | Facility: CLINIC | Age: 41
End: 2021-10-28
Payer: COMMERCIAL

## 2021-10-28 VITALS
TEMPERATURE: 98 F | HEIGHT: 65 IN | DIASTOLIC BLOOD PRESSURE: 89 MMHG | BODY MASS INDEX: 40.32 KG/M2 | OXYGEN SATURATION: 99 % | WEIGHT: 242 LBS | RESPIRATION RATE: 16 BRPM | HEART RATE: 82 BPM | SYSTOLIC BLOOD PRESSURE: 131 MMHG

## 2021-10-28 PROCEDURE — 99214 OFFICE O/P EST MOD 30 MIN: CPT

## 2021-10-28 RX ORDER — SUMATRIPTAN 25 MG/1
25 TABLET, FILM COATED ORAL
Qty: 9 | Refills: 0 | Status: DISCONTINUED | COMMUNITY
Start: 2019-01-16 | End: 2021-10-28

## 2021-11-04 NOTE — PHYSICAL EXAM
[FreeTextEntry1] : General:\par Constitutional:  Sitting comfortably in NAD.\par Psychiatric: well-groomed, appropriate affect\par Ears, Nose, Throat: no abnormalities, mucus membranes moist\par Neck: supple\par Extremities: no edema, clubbing or cyanosis\par Skin: no rash or neuro-cutaneous signs \par \par Cognitive:\par Orientation, language, memory and knowledge screens intact.\par \par Cranial Nerves:\par II: TIARA. III/IV/VI: EOM Full.  VII: Face appears symmetric VIII: Normal to screening\par IX/X: normal phonation  XI: Trapezius Symmetric  XII: Tongue midline\par Motor:\par Power: no pronator drift\par \par Narrow based gait\par \par \par \par

## 2021-11-04 NOTE — DISCUSSION/SUMMARY
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  Along with left arm intermittent sensory loss x 15min, tingling or numbness without mechanical provocation and sister with Neuromyelitis optica, will screen for NMO with blood tests, and refer to Dr. Aiken for further evaluation/management.\par 4) migraine - on topiramate and sumatriptan, inadequate, but without adverse effects.\par \par Plan:\par 1) NMO blood tests today\par 2) f/u with Dr. Aiken\par 3) push up topiramate to 150mg ER/d\par 4) nurtec vs other tripan - rizatriptan as next step

## 2021-11-04 NOTE — HISTORY OF PRESENT ILLNESS
[FreeTextEntry1] : Glenda is a 39 yo RHF with a history of migraine, but presented to the [facility] ER after a JnJ covid vaccine shot with a new type of headache.  The shot was April 22nd.  \par \par Migraine left temporal and eye.\par New H/A:  daily for 1 week, felt in the center of the head, refractory to sumatriptan 100mg.  Also on TPM.  It improved 2-3 days after the ER visit.\par \par Rizatriptan helping quicker, but having a lot of migraines still.  She is able to function after taking them.\par Having about 3d of headache/month, but may need to take both.\par \par There was a brief period of perioral numbness on the right, lasting 10minutes only.  No motor losses.\par No further symptoms in the hand.\par \par \par CT head showed a possible lesion, but MRI was negative.\par \par Her vision is still seemingly worsening, but perhaps more of a refraction issue and becoming farsighted, based on improvement with text a bit farther away.\par Her sister was diagnosed with NMO with multiple lesions and is on cellcept.\par She herself has sense of numbness into the left hand.  no symptoms into the legs.\par \par The MRI C-spine showed some signs of demylination \par \par She reports having had a terrible memory for years.  Loses train of though frequently.\par

## 2021-11-18 ENCOUNTER — APPOINTMENT (OUTPATIENT)
Dept: NEUROLOGY | Facility: CLINIC | Age: 41
End: 2021-11-18
Payer: COMMERCIAL

## 2021-11-18 VITALS
BODY MASS INDEX: 40.98 KG/M2 | DIASTOLIC BLOOD PRESSURE: 81 MMHG | OXYGEN SATURATION: 97 % | RESPIRATION RATE: 16 BRPM | SYSTOLIC BLOOD PRESSURE: 121 MMHG | HEART RATE: 88 BPM | TEMPERATURE: 97.7 F | WEIGHT: 246 LBS | HEIGHT: 65 IN

## 2021-11-18 PROCEDURE — 99213 OFFICE O/P EST LOW 20 MIN: CPT

## 2021-11-18 RX ORDER — TOPIRAMATE 150 MG/1
150 CAPSULE, EXTENDED RELEASE ORAL
Qty: 30 | Refills: 2 | Status: COMPLETED | COMMUNITY
Start: 2021-10-28 | End: 2021-11-18

## 2021-11-30 ENCOUNTER — TRANSCRIPTION ENCOUNTER (OUTPATIENT)
Age: 41
End: 2021-11-30

## 2021-12-02 ENCOUNTER — LABORATORY RESULT (OUTPATIENT)
Age: 41
End: 2021-12-02

## 2021-12-02 ENCOUNTER — APPOINTMENT (OUTPATIENT)
Dept: NEUROLOGY | Facility: CLINIC | Age: 41
End: 2021-12-02
Payer: COMMERCIAL

## 2021-12-02 VITALS
TEMPERATURE: 97.2 F | OXYGEN SATURATION: 96 % | BODY MASS INDEX: 40.98 KG/M2 | HEIGHT: 65 IN | SYSTOLIC BLOOD PRESSURE: 123 MMHG | HEART RATE: 93 BPM | WEIGHT: 246 LBS | DIASTOLIC BLOOD PRESSURE: 85 MMHG

## 2021-12-02 DIAGNOSIS — G95.9 DISEASE OF SPINAL CORD, UNSPECIFIED: ICD-10-CM

## 2021-12-02 DIAGNOSIS — M25.50 PAIN IN UNSPECIFIED JOINT: ICD-10-CM

## 2021-12-02 PROCEDURE — 99417 PROLNG OP E/M EACH 15 MIN: CPT

## 2021-12-02 PROCEDURE — 99215 OFFICE O/P EST HI 40 MIN: CPT

## 2021-12-02 NOTE — CONSULT LETTER
[Dear  ___] : Dear  [unfilled], [Consult Letter:] : I had the pleasure of evaluating your patient, [unfilled]. [Please see my note below.] : Please see my note below. [Sincerely,] : Sincerely, [FreeTextEntry3] : Alanis Aiken MD MSc\par Neuroimmunology and Multiple Sclerosis\par [facility]\par [facility]

## 2021-12-02 NOTE — HISTORY OF PRESENT ILLNESS
[FreeTextEntry1] : Reason for consult: possible MS\par \par HPI: MAGNUS GIRON is a 41 year old woman \par \par 4/2021 - had J+J vaccine.\par 2wks later, developed severe headaches. Went to ED, then had CTH for which she was told that she should f/u with neurologist. Had both brain and C spine MRI with the C spine showing cord changes. \par Sister with NMO on cellcept. \par Booster in 11/4/21.\par \par ROS/Current Sx:\par migraines - always left sided affecting eye and temple. nurtec helps. \par joint pains - since 11/4, in joints of hands and in both feet, ? post-booster\par intermittent paresthesias and pain and weakness in both hands, denies numbness. \par no BB dysfunction\par \par PMHX:\par asthma\par anemia\par gastric sleeve surgery 2011 - lost 70lbs\par \par MEDS:\par nortyptiline qhs - started 1wk ago\par topamax 150 qhs \par nurtec prn\par saxenda for weight\par \par ALL: doxy\par \par SHx: no tob, occ etoh, no drugs. elementary \par \par FHx: sister with NMO. pat aunt with SLE.\par \par Vitals: unremarkable\par \par Exam:\par \par AO3.  Normally conversant.  Follows commands, names, and repeats.  Good attention.\par \par PERRL, VFF, EOMI, no nystagmus, face symmetric, TUP at midline.\par \par Motor: \par                                                 R:                               L:\par Del                                           5                                5\par Bi                                              5                               5\par Tri                                            5                               5\par Wrist Extensors                      5                               5\par Finger abductors                    5                               5\par                                         5                               5 \par \par HF                                           5                               5\par KE                                           5                               5\par KF                                           5                               5\par DF                                           5                               5\par PF                                           5                               5\par \par Tone                                       R                               L\par UE                                          0                                0 \par LE                                          0                                0\par \par Sensory                                RUE                      LUE                 RLE                LLE     \par LT                                           +                            +                      +                   +\par Vib                                          +                            +                     mild to mod BL\par JPS                                         +                            +                      +                   +\par PP                                         +                            +                      +                   +\par Temp                                     +                            +                      +                   +\par \par Reflexes:\par                                              R                             L                            \par Biceps                                  2                             2\par BR                                        2                             2\par Triceps                                2                              2\par Pat                                        2                            2 \par AJ                                        2                             2\par \par TOES                                    F                            F\par \par \par Coordination:\par                                              R                             L                       \par FTN                                       0                             0 \par CHARLIE                                      0                            0\par HTS                                      0                             0 \par \par Other                                                                          \par  \par \par Gait: normal, can heel, toe, tandem\par \par                     Assistance: none\par \par MRI brain 7/2021 - unremarkable\par \par MRI C spine 8/2021 - c3-c6 posterior midline cord signal change\par \par \par AP: 42yo w/ sister with NMO who presented after having been found to have midline posterior cord signal change from c3-c6. She has had migraines as well as joint pains and intermittent paresthesias, but no acute neurological episodes that would suggest NMO. She does have mild to moderate vibratory sense loss in both feet, which is consistent with the MRI abnormality. Given the appearance on MRI, nutritional etiologies are entirely possible and are more likely given the clinical picture. However, family history increases the possibility of a neuroinflammatory condition.\par \par all questions answered, education provided, management discussed at length.\par \par - lab work, [place] aqp4 ab\par - EMG to r/o CTS causing hand pain and paresthesias or peripheral neuropathy causing exam findings.\par - repeat MRI C+T w and wo\par - rheum referral for joint pain\par - RTC 6wks\par \par

## 2021-12-03 LAB
CRP SERPL-MCNC: 10 MG/L
ENA SS-A AB SER IA-ACNC: <0.2 AL
ENA SS-B AB SER IA-ACNC: <0.2 AL
ERYTHROCYTE [SEDIMENTATION RATE] IN BLOOD BY WESTERGREN METHOD: 52 MM/HR
HIV1+2 AB SPEC QL IA.RAPID: NONREACTIVE
RHEUMATOID FACT SER QL: <10 IU/ML
T PALLIDUM AB SER QL IA: NEGATIVE

## 2021-12-07 LAB
COPPER SERPL-MCNC: 151 UG/DL
VIT B6 SERPL-MCNC: 23.6 UG/L

## 2021-12-08 LAB — VIT B1 SERPL-MCNC: 69.5 NMOL/L

## 2021-12-18 NOTE — HISTORY OF PRESENT ILLNESS
[FreeTextEntry1] : Glenda is a 41 yo RHF with a history of migraine, but presented to the [facility] ER after a JnJ covid vaccine shot with a new type of headache.  The shot was April 22nd.  \par \par The anti-MOG and anti-NMO antibodies were negative.\par \par She increased TPM from 50mg bid, to 150mg ER daily.\par She reports more joint pain that was significant - hands feel very stiff and achy, and in the hips and feet.  it feels like when she had iron infusions.\par \par There was no improvement in the headache with the increased TPM dose.\par \par Migraine daily x 4 days recently.  The first day took 2.  Tried 3 rizatriptans another day.  After the second she felt a bit slower at work. The third was at home.  Took tylenol.\par \par Migraine left temporal and eye typically, but also felt on the right with this recent one.\par Also timed with cycle.\par Refractory to sumatriptan 100mg.   Rizatriptan helping quicker, but having a lot of migraines still.  She is able to function after taking them.\par \par CT head showed a possible lesion, but MRI was negative.\par \par Her vision is still seemingly worsening, but perhaps more of a refraction issue and becoming farsighted, based on improvement with text a bit farther away.\par \par Her sister was diagnosed with NMO with multiple lesions and is on cellcept.  "The standard NMO treatment was not helpful"\par She herself has sense of numbness into the left hand.  no symptoms into the legs.\par The MRI C-spine showed signs of demylination \par \par She reports having had a terrible memory for years.  Loses train of though frequently.\par

## 2021-12-18 NOTE — DISCUSSION/SUMMARY
[FreeTextEntry1] : Impression:\par 1) seronegative NMO?  left arm intermittent sensory loss x 15min, tingling or numbness without mechanical provocation.  Sister dx of Neuromyelitis optica\par 2) migraine - topiramate to drop back to 50mg bid, with 7-8 headache d/month, often PMS related.  Not currently interested in botox.\par \par Plan:\par 1) consider addition of nurtec as she has failed 2 triptans now\par 2) trial of nortriptyline\par 3) consider rheumatology consultation if hand and joints are still stiff/painful.\par 4) to follow-up with Dr. Aiken for decisions.

## 2021-12-20 ENCOUNTER — NON-APPOINTMENT (OUTPATIENT)
Age: 41
End: 2021-12-20

## 2021-12-21 ENCOUNTER — APPOINTMENT (OUTPATIENT)
Dept: RHEUMATOLOGY | Facility: CLINIC | Age: 41
End: 2021-12-21
Payer: COMMERCIAL

## 2021-12-21 ENCOUNTER — RX RENEWAL (OUTPATIENT)
Age: 41
End: 2021-12-21

## 2021-12-21 ENCOUNTER — LABORATORY RESULT (OUTPATIENT)
Age: 41
End: 2021-12-21

## 2021-12-21 ENCOUNTER — NON-APPOINTMENT (OUTPATIENT)
Age: 41
End: 2021-12-21

## 2021-12-21 VITALS
HEART RATE: 91 BPM | WEIGHT: 247 LBS | BODY MASS INDEX: 41.15 KG/M2 | HEIGHT: 65 IN | OXYGEN SATURATION: 99 % | TEMPERATURE: 97.9 F | DIASTOLIC BLOOD PRESSURE: 87 MMHG | SYSTOLIC BLOOD PRESSURE: 125 MMHG

## 2021-12-21 DIAGNOSIS — Z11.1 ENCOUNTER FOR SCREENING FOR RESPIRATORY TUBERCULOSIS: ICD-10-CM

## 2021-12-21 DIAGNOSIS — D64.9 ANEMIA, UNSPECIFIED: ICD-10-CM

## 2021-12-21 DIAGNOSIS — R20.2 PARESTHESIA OF SKIN: ICD-10-CM

## 2021-12-21 PROCEDURE — 36415 COLL VENOUS BLD VENIPUNCTURE: CPT

## 2021-12-21 PROCEDURE — 99205 OFFICE O/P NEW HI 60 MIN: CPT | Mod: 25

## 2021-12-22 PROBLEM — Z11.1 SCREENING FOR TUBERCULOSIS: Status: ACTIVE | Noted: 2021-12-21

## 2021-12-22 PROBLEM — R20.2 PARESTHESIAS: Status: ACTIVE | Noted: 2021-06-10

## 2021-12-22 NOTE — ASSESSMENT
[FreeTextEntry1] : 42yo  F GERD, asthma, chronic anemia, chronic migraine HA, thyroid nodules \par was referred for arthralgia. \par She is getting evaluation for  ?  NMO when  presented after having been found to have midline posterior cord signal change from c3-c6.\par was referred for hand/feet arthralgia developed few days  after covid booster vaccine in Nov, 2021. \par On exam and ROS pt symptoms more concerning for peripheral neuropathy and less likely actual joint pain \par has tenderness between PIP and DIP joints and metatarsalgia. Previous rheum labs such as RF, RUTH, Sjogren and NMO Ab negative , but has elevated inflammatory markers. \par I have discussed with pt to check  additional Rheum labs ( ANCA, CCP, ACE) and infectious disease labs ( tick bite serology,hep panel, HIV,TB)  but based on her symptoms,ROS and exam I have low suspicious for any rheum underlaying AI/CTD to explain her symptoms and would focus to rule out primary neuro inflammatory process and I agree she should get EMG. \par Obtain CXR and b/l hand/wrist Xrays \par Labs done during the visit today. \par \par Will discuss with Neurology once all labs available for review

## 2021-12-22 NOTE — PHYSICAL EXAM
[General Appearance - Alert] : alert [General Appearance - In No Acute Distress] : in no acute distress [General Appearance - Well Nourished] : well nourished [General Appearance - Well Developed] : well developed [General Appearance - Well-Appearing] : healthy appearing [Sclera] : the sclera and conjunctiva were normal [Outer Ear] : the ears and nose were normal in appearance [Examination Of The Oral Cavity] : the lips and gums were normal [Oropharynx] : the oropharynx was normal [Neck Appearance] : the appearance of the neck was normal [Neck Cervical Mass (___cm)] : no neck mass was observed [Respiration, Rhythm And Depth] : normal respiratory rhythm and effort [Auscultation Breath Sounds / Voice Sounds] : lungs were clear to auscultation bilaterally [Heart Rate And Rhythm] : heart rate was normal and rhythm regular [Heart Sounds] : normal S1 and S2 [Murmurs] : no murmurs [Edema] : there was no peripheral edema [Abdomen Soft] : soft [Abdomen Tenderness] : non-tender [Cervical Lymph Nodes Enlarged Posterior Bilaterally] : posterior cervical [Cervical Lymph Nodes Enlarged Anterior Bilaterally] : anterior cervical [Supraclavicular Lymph Nodes Enlarged Bilaterally] : supraclavicular [No Spinal Tenderness] : no spinal tenderness [Abnormal Walk] : normal gait [Nail Clubbing] : no clubbing  or cyanosis of the fingernails [Musculoskeletal - Swelling] : no joint swelling seen [Motor Tone] : muscle strength and tone were normal [Skin Color & Pigmentation] : normal skin color and pigmentation [] : no rash [Skin Lesions] : no skin lesions [Motor Exam] : the motor exam was normal [Oriented To Time, Place, And Person] : oriented to person, place, and time [Impaired Insight] : insight and judgment were intact [FreeTextEntry1] : tender bones between PIP-DIP joints of hands and metatarsal tenderness, no synovitis, no  limited ROM or deformities

## 2021-12-22 NOTE — REVIEW OF SYSTEMS
[Constipation] : constipation [Arthralgias] : arthralgias [Joint Pain] : joint pain [Negative] : Genitourinary [Joint Swelling] : no joint swelling [Joint Stiffness] : no joint stiffness [Limb Pain] : no limb pain [Limb Swelling] : no limb swelling [Dizziness] : no dizziness [Fainting] : no fainting [Anxiety] : no anxiety [Depression] : no depression [Muscle Weakness] : no muscle weakness [Easy Bleeding] : no tendency for easy bleeding [Easy Bruising] : no tendency for easy bruising [Swollen Glands] : no swollen glands [Swollen Glands In The Neck] : no swollen glands in the neck

## 2021-12-22 NOTE — HISTORY OF PRESENT ILLNESS
[Arthralgias] : arthralgias [FreeTextEntry1] : Referred by Dr. Aiken  for Rheumatology consultation \par \par 40yo  F GERD, asthma, chronic anemia, chronic migraine HA, thyroid nodules \par was referred for arthralgia. \par She is getting evaluation for  ?  NMO when  presented after having been found to have midline posterior cord signal change from c3-c6. She has had migraines as well as joint pains and intermittent paresthesias, but no acute neurological episodes,  has sister with NMO and told at risk for neuro inflammatory condition.\par pending spine MRI with contrast. \par \par One months ago she developed joint pain mainly hand and feet  coincidence of  topiramate dose  increase and covid booster shot.( had Pfizer booster on Nov 4th ) \par Topiramate dose reduced, but  symptoms never resolved. Describes stabbing pain  between PIP-DIP joint and dorsal lateral  distal foot and toes, metatarsal pain trouble opening  bottles, sometimes drop things,  grasp is not strong. \par b/l wrist feels numb, radiating to the fingers  sometimes wakes up at night for above, tylenol does not help. \par Denies joint swelling or arthralgia of other part. \par memory loss, forget things \par \par \par No h/o morning stiffness, patchy hair loss, sicca symptoms, photosensitivity, HA,  Raynaud's, oral ulcers, nasal ulcers. \par No history of pleuropericarditis \par No history of protein/hematuria \par No history of cytopenias. \par No Hx of VTE/DVT/PE\par No miscxarriage, no hx of eclampsia \par Sister with NMO \par Aunt: SLE\par \par Had Rheum labs done: RF, RUTH, Sjogren and NMO Ab: negative\par Moderately elevated inflammatory markers \par  [Anorexia] : no anorexia [Malaise] : no malaise [Fever] : no fever [Chills] : no chills [Fatigue] : no fatigue [Depression] : no depression [Malar Facial Rash] : no malar facial rash [Skin Lesions] : no lesions [Skin Nodules] : no skin nodules [Oral Ulcers] : no oral ulcers [Cough] : no cough [Dry Mouth] : no dry mouth [Dysphonia] : no dysphonia [Dysphagia] : no dysphagia [Shortness of Breath] : no shortness of breath [Chest Pain] : no chest pain [Joint Swelling] : no joint swelling [Joint Warmth] : no joint warmth [Decreased ROM] : no decreased range of motion [Morning Stiffness] : no morning stiffness [Falls] : no falls [Difficulty Standing] : no difficulty standing [Difficulty Walking] : no difficulty walking [Dyspnea] : no dyspnea [Myalgias] : no myalgias [Muscle Weakness] : no muscle weakness [Muscle Spasms] : no muscle spasms [Muscle Cramping] : no muscle cramping [Visual Changes] : no visual changes [Eye Pain] : no eye pain [Eye Redness] : no eye redness [Dry Eyes] : no dry eyes

## 2021-12-27 ENCOUNTER — APPOINTMENT (OUTPATIENT)
Dept: MRI IMAGING | Facility: HOSPITAL | Age: 41
End: 2021-12-27

## 2021-12-28 ENCOUNTER — OUTPATIENT (OUTPATIENT)
Dept: OUTPATIENT SERVICES | Facility: HOSPITAL | Age: 41
LOS: 1 days | End: 2021-12-28
Payer: COMMERCIAL

## 2021-12-28 ENCOUNTER — APPOINTMENT (OUTPATIENT)
Dept: MRI IMAGING | Facility: HOSPITAL | Age: 41
End: 2021-12-28

## 2021-12-28 DIAGNOSIS — Z98.890 OTHER SPECIFIED POSTPROCEDURAL STATES: Chronic | ICD-10-CM

## 2021-12-28 DIAGNOSIS — Z98.89 OTHER SPECIFIED POSTPROCEDURAL STATES: Chronic | ICD-10-CM

## 2021-12-28 LAB
A PHAGOCYTOPH IGG TITR SER IF: NORMAL TITER
ACE BLD-CCNC: 28 U/L
ALBUMIN SERPL ELPH-MCNC: 4.6 G/DL
ALP BLD-CCNC: 62 U/L
ALT SERPL-CCNC: 12 U/L
ANCA AB SER-IMP: NEGATIVE
ANION GAP SERPL CALC-SCNC: 9 MMOL/L
APPEARANCE: ABNORMAL
AST SERPL-CCNC: 16 U/L
B BURGDOR AB SER QL IA: NEGATIVE
B BURGDOR AB SER-IMP: NEGATIVE
B BURGDOR IGG+IGM SER QL: 0.3 INDEX
B MICROTI IGG TITR SER: NORMAL TITER
BASOPHILS # BLD AUTO: 0.04 K/UL
BASOPHILS NFR BLD AUTO: 0.7 %
BILIRUB SERPL-MCNC: 0.2 MG/DL
BILIRUBIN URINE: NEGATIVE
BLOOD URINE: NEGATIVE
BUN SERPL-MCNC: 14 MG/DL
C-ANCA SER-ACNC: NEGATIVE
C3 SERPL-MCNC: 120 MG/DL
C4 SERPL-MCNC: 33 MG/DL
CALCIUM SERPL-MCNC: 9.6 MG/DL
CCP AB SER IA-ACNC: <8 UNITS
CHLORIDE SERPL-SCNC: 108 MMOL/L
CO2 SERPL-SCNC: 23 MMOL/L
COLOR: YELLOW
CREAT SERPL-MCNC: 1.03 MG/DL
CREAT SPEC-SCNC: 137 MG/DL
CREAT/PROT UR: 0.1 RATIO
E CHAFFEENSIS IGG TITR SER IF: NORMAL TITER
ENDOMYSIUM IGA SER QL: NEGATIVE
ENDOMYSIUM IGA TITR SER: NORMAL
EOSINOPHIL # BLD AUTO: 0.12 K/UL
EOSINOPHIL NFR BLD AUTO: 2.2 %
GLUCOSE QUALITATIVE U: NEGATIVE
GLUCOSE SERPL-MCNC: 88 MG/DL
HBV CORE IGG+IGM SER QL: NONREACTIVE
HBV SURFACE AB SER QL: REACTIVE
HBV SURFACE AG SER QL: NONREACTIVE
HCT VFR BLD CALC: 37.4 %
HCV AB SER QL: NONREACTIVE
HCV S/CO RATIO: 0.18 S/CO
HGB BLD-MCNC: 11.7 G/DL
HIV1+2 AB SPEC QL IA.RAPID: NONREACTIVE
IMM GRANULOCYTES NFR BLD AUTO: 0.2 %
KETONES URINE: NEGATIVE
LEUKOCYTE ESTERASE URINE: NEGATIVE
LYMPHOCYTES # BLD AUTO: 2.53 K/UL
LYMPHOCYTES NFR BLD AUTO: 45.7 %
M TB IFN-G BLD-IMP: NEGATIVE
MAN DIFF?: NORMAL
MCHC RBC-ENTMCNC: 27.3 PG
MCHC RBC-ENTMCNC: 31.3 GM/DL
MCV RBC AUTO: 87.4 FL
MONOCYTES # BLD AUTO: 0.27 K/UL
MONOCYTES NFR BLD AUTO: 4.9 %
MPO AB + PR3 PNL SER: NORMAL
NEUTROPHILS # BLD AUTO: 2.57 K/UL
NEUTROPHILS NFR BLD AUTO: 46.3 %
NITRITE URINE: NEGATIVE
P-ANCA TITR SER IF: NEGATIVE
PH URINE: 7
PLATELET # BLD AUTO: 343 K/UL
POTASSIUM SERPL-SCNC: 4.7 MMOL/L
PROT SERPL-MCNC: 7.7 G/DL
PROT UR-MCNC: 10 MG/DL
PROTEIN URINE: NORMAL
QUANTIFERON TB PLUS MITOGEN MINUS NIL: 5.83 IU/ML
QUANTIFERON TB PLUS NIL: 0.01 IU/ML
QUANTIFERON TB PLUS TB1 MINUS NIL: 0 IU/ML
QUANTIFERON TB PLUS TB2 MINUS NIL: 0 IU/ML
RBC # BLD: 4.28 M/UL
RBC # FLD: 14.8 %
RF+CCP IGG SER-IMP: NEGATIVE
SODIUM SERPL-SCNC: 140 MMOL/L
SPECIFIC GRAVITY URINE: 1.02
TTG IGA SER IA-ACNC: <1.2 U/ML
TTG IGA SER-ACNC: NEGATIVE
UROBILINOGEN URINE: NORMAL
WBC # FLD AUTO: 5.54 K/UL

## 2021-12-28 PROCEDURE — 72156 MRI NECK SPINE W/O & W/DYE: CPT

## 2021-12-30 ENCOUNTER — NON-APPOINTMENT (OUTPATIENT)
Age: 41
End: 2021-12-30

## 2022-01-04 ENCOUNTER — NON-APPOINTMENT (OUTPATIENT)
Age: 42
End: 2022-01-04

## 2022-01-12 ENCOUNTER — RX RENEWAL (OUTPATIENT)
Age: 42
End: 2022-01-12

## 2022-01-28 ENCOUNTER — APPOINTMENT (OUTPATIENT)
Dept: NEUROLOGY | Facility: CLINIC | Age: 42
End: 2022-01-28

## 2022-02-03 ENCOUNTER — APPOINTMENT (OUTPATIENT)
Dept: RHEUMATOLOGY | Facility: CLINIC | Age: 42
End: 2022-02-03
Payer: COMMERCIAL

## 2022-02-03 VITALS
TEMPERATURE: 97.7 F | WEIGHT: 246 LBS | OXYGEN SATURATION: 100 % | HEIGHT: 65 IN | HEART RATE: 97 BPM | SYSTOLIC BLOOD PRESSURE: 125 MMHG | BODY MASS INDEX: 40.98 KG/M2 | DIASTOLIC BLOOD PRESSURE: 87 MMHG | RESPIRATION RATE: 16 BRPM

## 2022-02-03 DIAGNOSIS — M79.642 PAIN IN RIGHT HAND: ICD-10-CM

## 2022-02-03 DIAGNOSIS — M79.641 PAIN IN RIGHT HAND: ICD-10-CM

## 2022-02-03 DIAGNOSIS — M25.50 PAIN IN UNSPECIFIED JOINT: ICD-10-CM

## 2022-02-03 DIAGNOSIS — G62.9 POLYNEUROPATHY, UNSPECIFIED: ICD-10-CM

## 2022-02-03 DIAGNOSIS — M19.049 PRIMARY OSTEOARTHRITIS, UNSPECIFIED HAND: ICD-10-CM

## 2022-02-03 PROCEDURE — 99214 OFFICE O/P EST MOD 30 MIN: CPT

## 2022-02-03 NOTE — ASSESSMENT
[FreeTextEntry1] : 40yo  F GERD, asthma, chronic anemia, chronic migraine HA, thyroid nodules \par was referred for arthralgia and elevated inflammatory markers \par She is getting evaluation for  ?  NMO when  presented after having been found to have midline posterior cord signal change from c3-c6.\par was referred for hand/feet arthralgia developed few days  after covid booster vaccine in Nov, 2021 and since then symptoms persist \par On exam and ROS pt symptoms more concerning for peripheral neuropathy and less likely actual joint pain \par has tenderness between PIP and DIP joints and metatarsalgia. Previous rheum labs such as RF, RUTH, Sjogren and NMO Ab negative, additional Rheum specific labs, ANCA, hepatitis, lyme all negative as well as Xrays negative for inflammatory changes , she has b/l CMC OA,  based on normal exam and negative  otherwise ROS  I have low suspicious for any rheum underlaying inflammatory arthritis/CTD to explain her symptoms\par I agree she should get EMG and trial of gabapentin. \par We talked to complete work up to have MSK hand and wrist US since her symproms are mainly affecting hands to identify any subclinical synovitis/tenosynovitis. \par For possible CTS: she can try b/l hand wrist at night while EMG pending and should return if confirmed CTS for median nerve infiltration if decides to proceed for infection with steroids. \par \par \par f/u prn

## 2022-02-03 NOTE — HISTORY OF PRESENT ILLNESS
[Arthralgias] : arthralgias [FreeTextEntry1] : Follow up:  2/3/22 \par \par 40 y/o F was referred for arthralgia, has negative rheum specific labs and hand/wrist and feet Xrays for inflammatory changes. Here to discuss labs/images\par Xray hand with mild CMC joint b/l arthritis, feet Xray with b/l dorsal and plantar  calcaneal enthesophytes, b/l hallux valgus \par Clear CXR\par So far wok up for neuro inflammatory condition is non conclusive. Spine MRI no abnormal enhancement. \par persistently elevated markers of inflammation. \par \par \par \par Referred by Dr. Aiken  for Rheumatology consultation \par \par 40yo  F GERD, asthma, chronic anemia, chronic migraine HA, thyroid nodules \par was referred for arthralgia. \par She is getting evaluation for  ?  NMO when  presented after having been found to have midline posterior cord signal change from c3-c6. She has had migraines as well as joint pains and intermittent paresthesias, but no acute neurological episodes,  has sister with NMO and told at risk for neuro inflammatory condition.\par pending spine MRI with contrast. \par \par One months ago she developed joint pain mainly hand and feet  coincidence of  topiramate dose  increase and covid booster shot.( had Pfizer booster on Nov 4th ) \par Topiramate dose reduced, but  symptoms never resolved. Describes stabbing pain  between PIP-DIP joint and dorsal lateral  distal foot and toes, metatarsal pain trouble opening  bottles, sometimes drop things,  grasp is not strong. \par b/l wrist feels numb, radiating to the fingers  sometimes wakes up at night for above, tylenol does not help. \par Denies joint swelling or arthralgia of other part. \par memory loss, forget things \par \par \par No h/o morning stiffness, patchy hair loss, sicca symptoms, photosensitivity, HA,  Raynaud's, oral ulcers, nasal ulcers. \par No history of pleuropericarditis \par No history of protein/hematuria \par No history of cytopenias. \par No Hx of VTE/DVT/PE\par No miscxarriage, no hx of eclampsia \par Sister with NMO \par Aunt: SLE\par \par Had Rheum labs done: RF, RUTH, Sjogren and NMO Ab: negative\par Moderately elevated inflammatory markers \par  [Anorexia] : no anorexia [Malaise] : no malaise [Fever] : no fever [Chills] : no chills [Fatigue] : no fatigue [Depression] : no depression [Malar Facial Rash] : no malar facial rash [Skin Lesions] : no lesions [Skin Nodules] : no skin nodules [Oral Ulcers] : no oral ulcers [Cough] : no cough [Dry Mouth] : no dry mouth [Dysphonia] : no dysphonia [Dysphagia] : no dysphagia [Shortness of Breath] : no shortness of breath [Chest Pain] : no chest pain [Joint Swelling] : no joint swelling [Joint Warmth] : no joint warmth [Decreased ROM] : no decreased range of motion [Morning Stiffness] : no morning stiffness [Falls] : no falls [Difficulty Standing] : no difficulty standing [Difficulty Walking] : no difficulty walking [Dyspnea] : no dyspnea [Myalgias] : no myalgias [Muscle Weakness] : no muscle weakness [Muscle Spasms] : no muscle spasms [Muscle Cramping] : no muscle cramping [Visual Changes] : no visual changes [Eye Pain] : no eye pain [Eye Redness] : no eye redness [Dry Eyes] : no dry eyes

## 2022-02-03 NOTE — PHYSICAL EXAM
[General Appearance - Alert] : alert [General Appearance - In No Acute Distress] : in no acute distress [General Appearance - Well Nourished] : well nourished [General Appearance - Well Developed] : well developed [General Appearance - Well-Appearing] : healthy appearing [Sclera] : the sclera and conjunctiva were normal [Outer Ear] : the ears and nose were normal in appearance [Neck Appearance] : the appearance of the neck was normal [] : no respiratory distress [Respiration, Rhythm And Depth] : normal respiratory rhythm and effort [Heart Rate And Rhythm] : heart rate was normal and rhythm regular [Murmurs] : no murmurs [Edema] : there was no peripheral edema [Abnormal Walk] : normal gait [Nail Clubbing] : no clubbing  or cyanosis of the fingernails [Musculoskeletal - Swelling] : no joint swelling seen [Motor Tone] : muscle strength and tone were normal [Oriented To Time, Place, And Person] : oriented to person, place, and time [Impaired Insight] : insight and judgment were intact [Exaggerated Use Of Accessory Muscles For Inspiration] : no accessory muscle use [FreeTextEntry1] : tender bones between PIP-DIP joints of hands and metatarsal tenderness, no synovitis, no  limited ROM or deformities

## 2022-02-09 ENCOUNTER — APPOINTMENT (OUTPATIENT)
Dept: NEUROLOGY | Facility: CLINIC | Age: 42
End: 2022-02-09
Payer: COMMERCIAL

## 2022-02-09 VITALS
OXYGEN SATURATION: 100 % | SYSTOLIC BLOOD PRESSURE: 122 MMHG | BODY MASS INDEX: 40.98 KG/M2 | HEART RATE: 105 BPM | WEIGHT: 246 LBS | DIASTOLIC BLOOD PRESSURE: 85 MMHG | TEMPERATURE: 98 F | HEIGHT: 65 IN

## 2022-02-09 PROCEDURE — 99215 OFFICE O/P EST HI 40 MIN: CPT

## 2022-02-09 NOTE — HISTORY OF PRESENT ILLNESS
[FreeTextEntry1] : Initial hx 12/2021\par 4/2021 - had J+J vaccine.\par 2wks later, developed severe headaches. Went to ED, then had CTH for which she was told that she should f/u with neurologist. Had both brain and C spine MRI with the C spine showing cord changes. \par Sister with NMO on cellcept. \par Booster in 11/4/21.\par Saw Dr. Yuen in late 2021 who did not believe her pain was 2/2 arthritis.\par Since ~2018, had intermittent paresthesias and pain and weakness in both hands, denies numbness, resolved in late 2021.\par BL hand and leg pains - since 11/4/21, sometimes can awaken her in her sleep\par \par \par Subj interval:\par \par Had EMG on 2/7 at [facility] [place] and reportedly there was no e/o neuropathy.\par \par Has not yet started gabapentin.\par \par Saw Dr. Yuen in late 2021 who did not believe her pain was 2/2 arthritis.\par \par migraines - always left sided affecting eye and temple. nurtec helps. \par \par hand and leg pains - since 11/4/21, sometimes can awaken her in her sleep\par \par no BB dysfunction\par \par \par PMHX:\par asthma\par anemia\par gastric sleeve surgery 2011 - lost 70lbs\par \par MEDS:\par nortyptiline 20 qhs\par topamax 150 qhs\par nurtec prn\par saxenda for weight\par \par O:\par \par aox3\par moves all 4 well\par face symm\par \par \par MRI brain 7/2021 - unremarkable\par \par MRI C spine 8/2021 - c3-c6 posterior midline cord signal change\par \par MRI C spine w and wo 12/2021 - no abnormal enhancement, stable t2 lesions. \par \par MRI C+T wo contrast 12/2021 - unchanged cervical cord signal change, normal thoracic cord.\par \par \par AP: 40yo w/ sister with NMO who presented after having been found to have midline posterior cord signal change from c3-c6. She has had migraines as well as joint pains and intermittent paresthesias, but no acute neurological episodes that would suggest NMO. She does have mild to moderate vibratory sense loss in both feet, which is consistent with the MRI abnormality. Given the appearance on MRI, nutritional etiologies are entirely possible and are more likely given the clinical picture. However, family history increases the possibility of a neuroinflammatory condition.\par \par Hand and leg pain is of unclear etiology. Paresthesias have resolved and there is no component of sensory loss or neuropathic-sounding pain. EMG was reportedly unrevealing, per pt. I am not convinced that her hand and leg pain is due to the cord abnormality, and in my opinion, it is more likely that the cord abnormality is incidental.\par \par all questions answered, education provided, management discussed at length.\par \par - trial of gabapentin 300qhs, inc to 300bid as tolerated, in case the pain is neuropathic.\par - await full EMG results\par - repeat C spine MRI in 12/2022\par

## 2022-02-18 ENCOUNTER — APPOINTMENT (OUTPATIENT)
Dept: ENDOCRINOLOGY | Facility: CLINIC | Age: 42
End: 2022-02-18
Payer: COMMERCIAL

## 2022-02-18 VITALS
HEART RATE: 97 BPM | BODY MASS INDEX: 42.1 KG/M2 | DIASTOLIC BLOOD PRESSURE: 91 MMHG | SYSTOLIC BLOOD PRESSURE: 138 MMHG | WEIGHT: 253 LBS

## 2022-02-18 PROCEDURE — 99214 OFFICE O/P EST MOD 30 MIN: CPT

## 2022-02-18 RX ORDER — SEMAGLUTIDE 1.34 MG/ML
2 INJECTION, SOLUTION SUBCUTANEOUS
Qty: 1 | Refills: 0 | Status: COMPLETED | OUTPATIENT
Start: 2022-02-18 | End: 2022-03-20

## 2022-02-18 RX ORDER — LIRAGLUTIDE 6 MG/ML
18 INJECTION, SOLUTION SUBCUTANEOUS
Qty: 1 | Refills: 5 | Status: DISCONTINUED | OUTPATIENT
Start: 2021-06-17 | End: 2022-02-18

## 2022-02-18 RX ORDER — LIRAGLUTIDE 6 MG/ML
18 INJECTION, SOLUTION SUBCUTANEOUS
Qty: 3 | Refills: 1 | Status: DISCONTINUED | COMMUNITY
Start: 2021-05-26 | End: 2022-02-18

## 2022-02-22 ENCOUNTER — TRANSCRIPTION ENCOUNTER (OUTPATIENT)
Age: 42
End: 2022-02-22

## 2022-02-22 ENCOUNTER — RX RENEWAL (OUTPATIENT)
Age: 42
End: 2022-02-22

## 2022-02-24 RX ORDER — SEMAGLUTIDE 1 MG/.5ML
1 INJECTION, SOLUTION SUBCUTANEOUS
Qty: 1 | Refills: 2 | Status: DISCONTINUED | COMMUNITY
Start: 2022-02-18 | End: 2022-02-24

## 2022-02-24 NOTE — ADDENDUM
[FreeTextEntry1] : Wegovy is a plan exclusion. Ms. Mittal prefers to restart phentermine 30 mg daily. 2/24/22

## 2022-02-24 NOTE — PHYSICAL EXAM
[Alert] : alert [Healthy Appearance] : healthy appearance [No Acute Distress] : no acute distress [Normal Sclera/Conjunctiva] : normal sclera/conjunctiva [Normal Hearing] : hearing was normal [No Respiratory Distress] : no respiratory distress [No Stigmata of Cushings Syndrome] : no stigmata of Cushings Syndrome [Normal Gait] : normal gait [Normal Insight/Judgement] : insight and judgment were intact [Kyphosis] : no kyphosis present [Acanthosis Nigricans] : no acanthosis nigricans [de-identified] : no moon facies, no supraclavicular fat pads

## 2022-02-24 NOTE — HISTORY OF PRESENT ILLNESS
[FreeTextEntry1] : Ms. Mittal is a 41 year-old woman with a history of migraine headaches, elevated body mass index presenting for follow-up of elevated body mass index. I saw her for an initial visit in April 2019 and last in May 2021.\par \par Elevated body mass index. Comorbidity of gastroesophageal reflux disease. \par Her young adult weight was around 200 pounds. Her highest weight was 307 pounds. \par She is status post laparoscopic banding in 2011 and gastric sleeve in 2014.\par Her roya weight after her second surgical procedure was 196 pounds. She had weight gain she attributed to uterine artery embolization. \par She had never tried any medications for weight loss. We started topiramate up to 50 mg twice daily in April 2019 and phentermine 15 mg daily in December 2019. We adjusted phentermine to 30 mg daily in January 2021. We discontinued phentermine and started Saxenda up to 1.8 mg SC daily in May 2021.\par \par Thyroid nodules.\par She was noted to have right subcentimeter thyroid nodules on ultrasound in December 2019. \par She has been clinically and biochemically euthyroid. \par Her mother and sister have a history of thyroid nodules. No family history of thyroid cancer. \par \par Galactorrhea.\par She had expressible galactorrhea since the birth of her last child over 18 years ago; recently resolved.\par Prolactin levels have been normal. \par She has annual mammogram testing through her gynecologist. \par Menstrual periods have been regular.\par \par Interim History \par We discontinued phentermine and started Saxenda up to 1.8 mg SC daily in May 2021. She is tolerating Saxenda but has not noted weight loss. \par She has had pain in her hands and feet. Migraine headaches are less frequent. \par She has been following with neurology and rheumatology; notes reviewed.\par She recently finished a degree in business administration and will now have more time for exercise.\par Weight is up 6 pounds since last visit; weight is overall down 7 pounds from 260 pounds. Her goal weight is 215 pounds.\par Medical and surgical history, medications, allergies, social and family history reviewed and updated as needed.

## 2022-02-24 NOTE — ASSESSMENT
[FreeTextEntry1] : Elevated body mass index. Comorbidity of gastroesophageal reflux disease. She is status post laparoscopic banding in 2011 and gastric sleeve in 2014. We reviewed lifestyle modifications for weight loss. We discussed follow-up nutrition. We discussed pharmacologic options for weight loss. She is tolerating topiramate which is also improving migraine headache frequency. She is amenable to a trial of another GLP-1 receptor agonist. We discussed the risks and benefits of the GLP-1 receptor agonist class, including but not limited to nausea, pancreatitis, medullary thyroid cancer. She has a history of a single episode of pancreatitis at age 33 in the setting of alcohol use. We reviewed subcutaneous injection technique, storage, and sharps disposal. \par Lifestyle modification\par Follow-up with nutrition\par Continue topiramate 50 mg twice daily; currently prescribed through neurology\par Stop Saxenda and start Ozempic 0.5 mg weekly for four weeks (sample given), then Wegovy 1 mg weekly\par \par Thyroid nodules. She was noted to have right subcentimeter thyroid nodules on ultrasound in December 2019. She has been clinically and biochemically euthyroid. Her mother and sister have a history of thyroid nodules. No family history of thyroid cancer. \par Interval thyroid ultrasound\par \par Expressible galactorrhea. Resolved. Prolactin levels had been normal.\par \par Return to see me in 3 months. \par \par CC:\par Dr. Curt Estrada, Fax [phone]

## 2022-02-27 ENCOUNTER — RX RENEWAL (OUTPATIENT)
Age: 42
End: 2022-02-27

## 2022-03-01 ENCOUNTER — RX RENEWAL (OUTPATIENT)
Age: 42
End: 2022-03-01

## 2022-03-02 ENCOUNTER — APPOINTMENT (OUTPATIENT)
Dept: NEUROLOGY | Facility: CLINIC | Age: 42
End: 2022-03-02

## 2022-03-16 ENCOUNTER — RX RENEWAL (OUTPATIENT)
Age: 42
End: 2022-03-16

## 2022-03-18 ENCOUNTER — TRANSCRIPTION ENCOUNTER (OUTPATIENT)
Age: 42
End: 2022-03-18

## 2022-03-25 ENCOUNTER — TRANSCRIPTION ENCOUNTER (OUTPATIENT)
Age: 42
End: 2022-03-25

## 2022-03-25 NOTE — ED PROVIDER NOTE - NS ED ATTENDING STATEMENT MOD
Chief Complaint  Annual Exam    Subjective     {Problem List  Visit Diagnosis   Encounters  Notes  Medications  Labs  Result Review Imaging  Media :23}     Shanae Lockwood presents to [facility] who presents for a physical exam.      History of Present Illness    Objective   Vital Signs:   There were no vitals taken for this visit.    {BMI is above normal parameters. Recommendations (Optional):[id number]}       Physical Exam   Result Review :{Labs  Result Review  Imaging  Med Tab  Media  Procedures :23}   {The following data was reviewed by (Optional):62395}  {Ambulatory Labs (Optional):78658}  {Data reviewed (Optional):33769:::1}          Assessment and Plan {CC Problem List  Visit Diagnosis   ROS  Review (Popup)  Health Maintenance  Quality  BestPractice  Medications  SmartSets  SnapShot Encounters  Media :23}   There are no diagnoses linked to this encounter.  {Time Spent (Optional):94869}  Follow Up {Instructions Charge Capture  Follow-up Communications :23}  No follow-ups on file.  Patient was given instructions and counseling regarding his condition or for health maintenance advice. Please see specific information pulled into the AVS if appropriate.        Attending Only

## 2022-04-11 ENCOUNTER — RX RENEWAL (OUTPATIENT)
Age: 42
End: 2022-04-11

## 2022-04-29 ENCOUNTER — TRANSCRIPTION ENCOUNTER (OUTPATIENT)
Age: 42
End: 2022-04-29

## 2022-05-05 ENCOUNTER — RX RENEWAL (OUTPATIENT)
Age: 42
End: 2022-05-05

## 2022-05-13 ENCOUNTER — TRANSCRIPTION ENCOUNTER (OUTPATIENT)
Age: 42
End: 2022-05-13

## 2022-05-16 ENCOUNTER — TRANSCRIPTION ENCOUNTER (OUTPATIENT)
Age: 42
End: 2022-05-16

## 2022-05-20 ENCOUNTER — APPOINTMENT (OUTPATIENT)
Dept: ENDOCRINOLOGY | Facility: CLINIC | Age: 42
End: 2022-05-20

## 2022-05-31 ENCOUNTER — RX RENEWAL (OUTPATIENT)
Age: 42
End: 2022-05-31

## 2022-05-31 ENCOUNTER — TRANSCRIPTION ENCOUNTER (OUTPATIENT)
Age: 42
End: 2022-05-31

## 2022-07-01 ENCOUNTER — TRANSCRIPTION ENCOUNTER (OUTPATIENT)
Age: 42
End: 2022-07-01

## 2022-07-27 ENCOUNTER — TRANSCRIPTION ENCOUNTER (OUTPATIENT)
Age: 42
End: 2022-07-27

## 2022-08-24 ENCOUNTER — APPOINTMENT (OUTPATIENT)
Dept: ENDOCRINOLOGY | Facility: CLINIC | Age: 42
End: 2022-08-24

## 2022-08-24 VITALS
DIASTOLIC BLOOD PRESSURE: 87 MMHG | BODY MASS INDEX: 41.6 KG/M2 | HEART RATE: 90 BPM | WEIGHT: 250 LBS | SYSTOLIC BLOOD PRESSURE: 127 MMHG

## 2022-08-24 PROCEDURE — 99214 OFFICE O/P EST MOD 30 MIN: CPT

## 2022-08-24 RX ORDER — VITAMIN B COMPLEX
CAPSULE ORAL
Refills: 0 | Status: DISCONTINUED | COMMUNITY
End: 2022-08-24

## 2022-08-24 RX ORDER — TIRZEPATIDE 2.5 MG/.5ML
2.5 INJECTION, SOLUTION SUBCUTANEOUS
Qty: 1 | Refills: 0 | Status: COMPLETED | OUTPATIENT
Start: 2022-08-24 | End: 2022-09-23

## 2022-08-24 RX ORDER — PHENTERMINE HYDROCHLORIDE 30 MG/1
30 CAPSULE ORAL
Qty: 30 | Refills: 0 | Status: DISCONTINUED | COMMUNITY
Start: 2022-02-24 | End: 2022-08-24

## 2022-08-24 RX ORDER — GABAPENTIN 300 MG/1
300 CAPSULE ORAL
Qty: 60 | Refills: 5 | Status: DISCONTINUED | COMMUNITY
Start: 2022-01-04 | End: 2022-08-24

## 2022-08-24 RX ORDER — PEN NEEDLE, DIABETIC 32 GX 1/4"
32G X 6 MM NEEDLE, DISPOSABLE MISCELLANEOUS
Qty: 4 | Refills: 3 | Status: DISCONTINUED | COMMUNITY
Start: 2021-11-30 | End: 2022-08-24

## 2022-08-24 RX ORDER — NORTRIPTYLINE HYDROCHLORIDE 10 MG/1
10 CAPSULE ORAL
Qty: 60 | Refills: 2 | Status: DISCONTINUED | COMMUNITY
Start: 2021-11-18 | End: 2022-08-24

## 2022-10-03 ENCOUNTER — TRANSCRIPTION ENCOUNTER (OUTPATIENT)
Age: 42
End: 2022-10-03

## 2022-10-03 NOTE — HISTORY OF PRESENT ILLNESS
[FreeTextEntry1] : Ms. Mittal is a 42 year-old woman with a history of migraine headaches, elevated body mass index presenting for follow-up of elevated body mass index. I saw her for an initial visit in April 2019 and last in February 2022.\par \par Elevated body mass index. Comorbidity of gastroesophageal reflux disease. \par Her young adult weight was around 200 pounds. Her highest weight was 307 pounds. \par She is status post laparoscopic banding in 2011 and gastric sleeve in 2014.\par Her roya weight after her second surgical procedure was 196 pounds. She had weight gain she attributed to uterine artery embolization. \par She had never tried any medications for weight loss. We started topiramate up to 50 mg twice daily in April 2019 and phentermine 15 mg daily in December 2019. We adjusted phentermine to 30 mg daily in January 2021. We discontinued phentermine and started Saxenda up to 1.8 mg SC daily in May 2021. Saxenda was ineffective and we restarted phentermine 30 mg daily in February 2022.\par \par Thyroid nodules.\par She was noted to have right subcentimeter thyroid nodules on ultrasound in December 2019. \par She has been clinically and biochemically euthyroid. \par Her mother and sister have a history of thyroid nodules. No family history of thyroid cancer. \par \par Galactorrhea.\par She had expressible galactorrhea since the birth of her last child over 18 years ago; recently resolved.\par Prolactin levels have been normal. \par She has annual mammogram testing through her gynecologist. \par Menstrual periods have been regular.\par \par Interim History \par Saxenda was ineffective and we restarted phentermine 30 mg daily in February.\par The pain in her hands and feet has resolved; her neurologist believed it may have been an autoimmune reaction to the COVID-19 booster. Gabapentin and nortriptyline were discontinued. Migraine headaches are overall less frequent with topiramate. \par She has been working with an outside nutritionist. She has been exercising more.\par She is interested in Mounjaro for weight loss.\par She recently returned from a trip to [place]. \par Weight is down 3 pounds since last visit; weight is overall down 10 pounds from 260 pounds. Her goal weight is 215 pounds.\par Medical and surgical history, medications, allergies, social and family history reviewed and updated as needed.

## 2022-10-03 NOTE — PHYSICAL EXAM
[Alert] : alert [Healthy Appearance] : healthy appearance [No Acute Distress] : no acute distress [Normal Sclera/Conjunctiva] : normal sclera/conjunctiva [Normal Hearing] : hearing was normal [No Respiratory Distress] : no respiratory distress [No Stigmata of Cushings Syndrome] : no stigmata of Cushings Syndrome [Normal Gait] : normal gait [Normal Insight/Judgement] : insight and judgment were intact [Kyphosis] : no kyphosis present [Acanthosis Nigricans] : no acanthosis nigricans [de-identified] : no moon facies, no supraclavicular fat pads

## 2022-10-03 NOTE — ADDENDUM
[FreeTextEntry1] : Ms. Mittal sent me a Portal message that she has been doing well with Mounjaro 5 mg weekly and wanted to adjust her dose to 7.5 mg weekly. 10/03/22

## 2022-10-03 NOTE — ASSESSMENT
[FreeTextEntry1] : Elevated body mass index. Comorbidity of gastroesophageal reflux disease. She is status post laparoscopic banding in 2011 and gastric sleeve in 2014. We reviewed lifestyle modifications for weight loss. We discussed follow-up nutrition. We discussed pharmacologic options for weight loss. She is tolerating topiramate which is also improving migraine headache frequency. She is amenable to a trial of Mounjaro off-label for weight loss. We discussed the risks and benefits of the GLP-1/GIP receptor agonist class, including but not limited to nausea, pancreatitis, medullary thyroid cancer. She has a history of a single episode of pancreatitis at age 33 in the setting of alcohol use and has tolerated previous GLP-1 receptor agonist therapy. We reviewed subcutaneous injection technique, storage, and sharps disposal. She successfully administered a dose of Mounjaro in the office. \par Lifestyle modification\par Follow-up with nutrition\par Continue topiramate 50 mg twice daily\par Stop phentermine and start Mounjaro 2.5 mg weekly for four weeks (samples given), then 5 mg weekly\par \par Thyroid nodules. She was noted to have right subcentimeter thyroid nodules on ultrasound in December 2019. She has been clinically and biochemically euthyroid. Her mother and sister have a history of thyroid nodules. No family history of thyroid cancer. \par Interval thyroid ultrasound\par \par Expressible galactorrhea. Resolved. Prolactin levels had been normal.\par \par Return to see me in 3 months. \par \par CC:\par Dr. Curt Estrada, Fax [phone]

## 2022-10-24 ENCOUNTER — TRANSCRIPTION ENCOUNTER (OUTPATIENT)
Age: 42
End: 2022-10-24

## 2022-10-27 ENCOUNTER — TRANSCRIPTION ENCOUNTER (OUTPATIENT)
Age: 42
End: 2022-10-27

## 2022-11-08 ENCOUNTER — TRANSCRIPTION ENCOUNTER (OUTPATIENT)
Age: 42
End: 2022-11-08

## 2022-11-11 ENCOUNTER — TRANSCRIPTION ENCOUNTER (OUTPATIENT)
Age: 42
End: 2022-11-11

## 2022-11-29 ENCOUNTER — TRANSCRIPTION ENCOUNTER (OUTPATIENT)
Age: 42
End: 2022-11-29

## 2022-12-29 ENCOUNTER — LABORATORY RESULT (OUTPATIENT)
Age: 42
End: 2022-12-29

## 2022-12-29 ENCOUNTER — APPOINTMENT (OUTPATIENT)
Dept: NEPHROLOGY | Facility: CLINIC | Age: 42
End: 2022-12-29

## 2022-12-29 VITALS — HEART RATE: 83 BPM | SYSTOLIC BLOOD PRESSURE: 109 MMHG | DIASTOLIC BLOOD PRESSURE: 79 MMHG

## 2022-12-29 PROCEDURE — 99204 OFFICE O/P NEW MOD 45 MIN: CPT | Mod: 25

## 2022-12-29 PROCEDURE — 36415 COLL VENOUS BLD VENIPUNCTURE: CPT

## 2022-12-29 NOTE — CONSULT LETTER
[FreeTextEntry1] : Dear Curt,\par \par I had the pleasure of seeing Glenda Mittal in consultation for kidney disease. My note is attached.\par \par Thank you for the opportunity to participate in the care of your patient.\par \par Please call me on my mobile phone at [phone] or in the office at [phone] if you have any questions.\par \par Best regards,\par \par Abdi\par \par Abdi Salmeron MD, FACP, FASN\par [address], [place]\par [email or web address]\par Office: [phone]\par Mobile: [phone]

## 2022-12-29 NOTE — HISTORY OF PRESENT ILLNESS
[FreeTextEntry1] : Kindly referred by Dr. Estrada for CKD. \par \par In December 2021 her creatinine was 1.03. U/A nl. Her creatinine increased to 1.19 (eGFR 58) 11/14/22. \par \par She had kidney stones in 2011. On topamax for migraines.  No recent stones or colic. Stone was "calcium." \par \par The patient denies exposure to chronic NSAIDs, chronic PPIs, green smoothies, creatine, or herbal supplements. The patient denies a history of pyelonephritis. No HTN or DM. No significant nocturia. No recent renal ultrasound. They are unaware of proteinuria or hematuria.\par \par Sees Dr. Ramirez for obesity. On Mounjaro. No DM. \par \par She had protein in the urine 20 years ago while pregnant but does not remember being told she had preeclampsia.

## 2022-12-31 LAB
ALBUMIN MFR SERPL ELPH: 53.8 %
ALBUMIN SERPL ELPH-MCNC: 4.2 G/DL
ALBUMIN SERPL-MCNC: 3.9 G/DL
ALBUMIN/GLOB SERPL: 1.1 RATIO
ALP BLD-CCNC: 57 U/L
ALPHA1 GLOB MFR SERPL ELPH: 4.4 %
ALPHA1 GLOB SERPL ELPH-MCNC: 0.3 G/DL
ALPHA2 GLOB MFR SERPL ELPH: 12.4 %
ALPHA2 GLOB SERPL ELPH-MCNC: 0.9 G/DL
ALT SERPL-CCNC: 11 U/L
ANION GAP SERPL CALC-SCNC: 10 MMOL/L
APPEARANCE: ABNORMAL
AST SERPL-CCNC: 12 U/L
B-GLOBULIN MFR SERPL ELPH: 10.2 %
B-GLOBULIN SERPL ELPH-MCNC: 0.7 G/DL
BASOPHILS # BLD AUTO: 0.04 K/UL
BASOPHILS NFR BLD AUTO: 0.8 %
BILIRUB SERPL-MCNC: 0.2 MG/DL
BILIRUBIN URINE: NEGATIVE
BLOOD URINE: NEGATIVE
BUN SERPL-MCNC: 12 MG/DL
CALCIUM SERPL-MCNC: 9.2 MG/DL
CALCIUM SERPL-MCNC: 9.2 MG/DL
CHLORIDE SERPL-SCNC: 107 MMOL/L
CO2 SERPL-SCNC: 23 MMOL/L
COLOR: YELLOW
CREAT SERPL-MCNC: 0.96 MG/DL
CREAT SPEC-SCNC: 221 MG/DL
CYSTATIN C SERPL-MCNC: 0.85 MG/L
DEPRECATED KAPPA LC FREE/LAMBDA SER: 2.01 RATIO
EGFR: 75 ML/MIN/1.73M2
EOSINOPHIL # BLD AUTO: 0.27 K/UL
EOSINOPHIL NFR BLD AUTO: 5.5 %
GAMMA GLOB FLD ELPH-MCNC: 1.4 G/DL
GAMMA GLOB MFR SERPL ELPH: 19.2 %
GFR/BSA.PRED SERPLBLD CYS-BASED-ARV: 96 ML/MIN/1.73M2
GLUCOSE QUALITATIVE U: NEGATIVE
GLUCOSE SERPL-MCNC: 80 MG/DL
HCT VFR BLD CALC: 39.3 %
HGB BLD-MCNC: 12.4 G/DL
IGA SER QL IEP: 79 MG/DL
IGG SER QL IEP: 1541 MG/DL
IGM SER QL IEP: 110 MG/DL
IMM GRANULOCYTES NFR BLD AUTO: 0 %
INTERPRETATION SERPL IEP-IMP: NORMAL
KAPPA LC CSF-MCNC: 1.34 MG/DL
KAPPA LC SERPL-MCNC: 2.7 MG/DL
KETONES URINE: NEGATIVE
LEUKOCYTE ESTERASE URINE: NEGATIVE
LYMPHOCYTES # BLD AUTO: 2.33 K/UL
LYMPHOCYTES NFR BLD AUTO: 47.7 %
M PROTEIN SPEC IFE-MCNC: NORMAL
MAGNESIUM SERPL-MCNC: 2.1 MG/DL
MAN DIFF?: NORMAL
MCHC RBC-ENTMCNC: 28.2 PG
MCHC RBC-ENTMCNC: 31.6 GM/DL
MCV RBC AUTO: 89.3 FL
MICROALBUMIN 24H UR DL<=1MG/L-MCNC: <1.2 MG/DL
MICROALBUMIN/CREAT 24H UR-RTO: NORMAL MG/G
MONOCYTES # BLD AUTO: 0.34 K/UL
MONOCYTES NFR BLD AUTO: 7 %
NEUTROPHILS # BLD AUTO: 1.9 K/UL
NEUTROPHILS NFR BLD AUTO: 39 %
NITRITE URINE: NEGATIVE
PARATHYROID HORMONE INTACT: 38 PG/ML
PH URINE: 7
PHOSPHATE SERPL-MCNC: 3.3 MG/DL
PLATELET # BLD AUTO: 308 K/UL
POTASSIUM SERPL-SCNC: 4.6 MMOL/L
PROT SERPL-MCNC: 7.3 G/DL
PROTEIN URINE: ABNORMAL
RBC # BLD: 4.4 M/UL
RBC # FLD: 15.1 %
SODIUM SERPL-SCNC: 140 MMOL/L
SPECIFIC GRAVITY URINE: 1.03
UROBILINOGEN URINE: NORMAL
WBC # FLD AUTO: 4.88 K/UL

## 2023-01-03 LAB
ALBUPE: 15.4 %
ALPHA1UPE: 32.6 %
ALPHA2UPE: 25.3 %
BETAUPE: 17.9 %
GAMMAUPE: 8.8 %
IGA 24H UR QL IFE: NORMAL
KAPPA LC 24H UR QL: NORMAL
PROT PATTERN 24H UR ELPH-IMP: NORMAL
PROT UR-MCNC: 10 MG/DL
PROT UR-MCNC: 10 MG/DL

## 2023-01-13 ENCOUNTER — APPOINTMENT (OUTPATIENT)
Dept: ULTRASOUND IMAGING | Facility: CLINIC | Age: 43
End: 2023-01-13
Payer: COMMERCIAL

## 2023-01-13 PROCEDURE — 76770 US EXAM ABDO BACK WALL COMP: CPT

## 2023-01-23 ENCOUNTER — TRANSCRIPTION ENCOUNTER (OUTPATIENT)
Age: 43
End: 2023-01-23

## 2023-02-01 ENCOUNTER — APPOINTMENT (OUTPATIENT)
Dept: NEPHROLOGY | Facility: CLINIC | Age: 43
End: 2023-02-01
Payer: COMMERCIAL

## 2023-02-01 VITALS — HEART RATE: 83 BPM | DIASTOLIC BLOOD PRESSURE: 78 MMHG | SYSTOLIC BLOOD PRESSURE: 111 MMHG

## 2023-02-01 PROCEDURE — 99214 OFFICE O/P EST MOD 30 MIN: CPT

## 2023-02-01 NOTE — ASSESSMENT
[FreeTextEntry1] : # Borderline elevated cfreatinien with normal eGFR.\par * Folow up in 6 months and recheck labs.\par \par # Kidney stones.\par * Maintain high fluid intake. WATERMINDER STANISLAW PRESCRIBED. \par \par # Obesity.\par * Weight loss. \par * Cont mounjaro.

## 2023-02-01 NOTE — HISTORY OF PRESENT ILLNESS
[FreeTextEntry1] : Kindly referred by Dr. Estrada for CKD. \par \par * BP controlled. BP 110s at home. No lightheadedness. No CP/SOB. Compliant with medications. * No kidney stones on topamax. She has stopped this. * Low urine volume. * eGFR by [facility] eGFRcys equation is 100. * Obesity improved on mounjaro. \par \par Previous history ([id number]): In December 2021 her creatinine was 1.03. U/A nl. Her creatinine increased to 1.19 (eGFR 58) 11/14/22. \par \par She had kidney stones in 2011. On topamax for migraines.  No recent stones or colic. \par \par The patient denies exposure to chronic NSAIDs, chronic PPIs, green smoothies, creatine, or herbal supplements. The patient denies a history of pyelonephritis. No HTN or DM. No significant nocturia. No recent renal ultrasound. They are unaware of proteinuria or hematuria.\par \par Sees Dr. Ramirez for obesity. On Mounjaro. No DM. \par \par She had protein in the urine 20 years ago while pregnant but does not remember being told she had preeclampsia.

## 2023-02-16 ENCOUNTER — TRANSCRIPTION ENCOUNTER (OUTPATIENT)
Age: 43
End: 2023-02-16

## 2023-02-21 ENCOUNTER — NON-APPOINTMENT (OUTPATIENT)
Age: 43
End: 2023-02-21

## 2023-02-21 ENCOUNTER — APPOINTMENT (OUTPATIENT)
Dept: HEART AND VASCULAR | Facility: CLINIC | Age: 43
End: 2023-02-21
Payer: COMMERCIAL

## 2023-02-21 VITALS
HEIGHT: 65 IN | DIASTOLIC BLOOD PRESSURE: 79 MMHG | HEART RATE: 90 BPM | BODY MASS INDEX: 38.15 KG/M2 | TEMPERATURE: 98.4 F | WEIGHT: 228.99 LBS | OXYGEN SATURATION: 97 % | SYSTOLIC BLOOD PRESSURE: 120 MMHG

## 2023-02-21 DIAGNOSIS — R07.9 CHEST PAIN, UNSPECIFIED: ICD-10-CM

## 2023-02-21 PROCEDURE — 93000 ELECTROCARDIOGRAM COMPLETE: CPT

## 2023-02-21 NOTE — HISTORY OF PRESENT ILLNESS
[FreeTextEntry1] : 42 F Asthma Anemia Kidney Stones Obesity Migraines GERD Gastric Sleeve NSAID \par \par \par referred by Dr. Estrada for left shoulder pain started in December 2022 after having a viral illness with coughing since then has pain every day nothing makes it better she had a biopsy of her axillary lymph node biopsy pain radiates to her chest all day long to her neck and under arm. Not worse with walking. \par \par \par ecg nsr 2/21/2023 \par

## 2023-02-21 NOTE — ASSESSMENT
[FreeTextEntry1] : - pain is non cardiac and reproducible\par - at this point she will likely need imaging given duration of her pain\par - fu as needed

## 2023-03-17 ENCOUNTER — TRANSCRIPTION ENCOUNTER (OUTPATIENT)
Age: 43
End: 2023-03-17

## 2023-03-17 NOTE — ED ADULT NURSE NOTE - CHPI ED NUR SYMPTOMS POS
Call primary care provider for follow up after discharge/Activities as tolerated/Low salt diet/Monitor weight daily/Report signs and symptoms to primary care provider HEADACHE/PAIN

## 2023-03-31 ENCOUNTER — APPOINTMENT (OUTPATIENT)
Dept: ENDOCRINOLOGY | Facility: CLINIC | Age: 43
End: 2023-03-31
Payer: COMMERCIAL

## 2023-03-31 VITALS
WEIGHT: 225 LBS | SYSTOLIC BLOOD PRESSURE: 129 MMHG | HEIGHT: 65 IN | HEART RATE: 96 BPM | BODY MASS INDEX: 37.49 KG/M2 | DIASTOLIC BLOOD PRESSURE: 90 MMHG

## 2023-03-31 DIAGNOSIS — E04.2 NONTOXIC MULTINODULAR GOITER: ICD-10-CM

## 2023-03-31 PROCEDURE — 99214 OFFICE O/P EST MOD 30 MIN: CPT

## 2023-03-31 NOTE — PHYSICAL EXAM
[Alert] : alert [Healthy Appearance] : healthy appearance [No Acute Distress] : no acute distress [Normal Sclera/Conjunctiva] : normal sclera/conjunctiva [Normal Hearing] : hearing was normal [No Respiratory Distress] : no respiratory distress [No Stigmata of Cushings Syndrome] : no stigmata of Cushings Syndrome [Normal Gait] : normal gait [Normal Insight/Judgement] : insight and judgment were intact [Kyphosis] : no kyphosis present [Acanthosis Nigricans] : no acanthosis nigricans [de-identified] : no moon facies, no supraclavicular fat pads

## 2023-03-31 NOTE — DATA REVIEWED
[FreeTextEntry1] : Laboratories (March 4, 2023) reviewed and significant for: \par TSH 1.18 uIU/mL\par \par Thyroid ultrasound (December 16, 2019) reviewed and significant for:\par ISTHMUS: Normal size in AP dimension measuring 0.4 cm.\par RIGHT LOBE:  Measures 4.7 x 1.5 x 2.1 cm in sagittal x AP x transverse dimensions.  Volume 7.9 cc.\par ARCHITECTURE: Upper pole cystic nodule measures up to 0.5 cm. Upper midpole cystic nodule measures up to 0.2 cm. Midpole cystic nodule measures up to 0.3 cm.\par COLOR DOPPLER:  Unremarkable. \par LEFT LOBE:    Measures 4.7 x 1.1 x 2.3 cm in sagittal x AP x transverse dimensions. Volume 6.4 cc.\par ARCHITECTURE: Homogeneous without a discrete nodule.\par COLOR DOPPLER:  Unremarkable. \par Visualized bilateral cervical lymph nodes are within normal limits for size and morphology.\par IMPRESSION:\par Benign cystic thyroid nodules in the right lobe.

## 2023-03-31 NOTE — HISTORY OF PRESENT ILLNESS
[FreeTextEntry1] : Ms. Mittal is a 42 year-old woman with a history of migraine headaches, elevated body mass index presenting for follow-up of elevated body mass index. I saw her for an initial visit in April 2019 and last in August 2022.\par \par Elevated body mass index. Comorbidity of gastroesophageal reflux disease. \par Her young adult weight was around 200 pounds. Her highest weight was 307 pounds. \par She is status post laparoscopic banding in 2011 and gastric sleeve in 2014.\par Her roya weight after her second surgical procedure was 196 pounds. She had weight gain she attributed to uterine artery embolization. \par She had never tried any medications for weight loss. We started topiramate up to 50 mg twice daily in April 2019 and phentermine 15 mg daily in December 2019. We adjusted phentermine to 30 mg daily in January 2021. We discontinued phentermine and started Saxenda up to 1.8 mg SC daily in May 2021. Saxenda was ineffective and we restarted phentermine 30 mg daily in February 2022, discontinued in August 2022. She discontinued topiramate in February 2023. In August 2022 we started Mounjaro, titrated up to 15 mg weekly.\par \par Thyroid nodules.\par She was noted to have right subcentimeter thyroid nodules on ultrasound in December 2019. \par She has been clinically and biochemically euthyroid. \par Her mother and sister have a history of thyroid nodules. No family history of thyroid cancer. \par \par Galactorrhea.\par She had expressible galactorrhea since the birth of her last child over 18 years ago; recently resolved.\par Prolactin levels have been normal. \par She has annual mammogram testing through her gynecologist. \par Menstrual periods have recently been irregular; her gynecologist has informed her she is in the perimenopausal period.\par \par Interim History \par In August 2022 we started Mounjaro, titrated up to 15 mg weekly.\par She has been working with an outside nutritionist. She has been trying to exercise.\par Weight is down 25 pounds since last visit; weight is overall down 35 pounds from 260 pounds. Her goal weight is 215 pounds. She has children ranging from 3 to 10 years old.\par Medical and surgical history, medications, allergies, social and family history reviewed and updated as needed.

## 2023-03-31 NOTE — ASSESSMENT
[FreeTextEntry1] : Elevated body mass index. Comorbidity of gastroesophageal reflux disease. She is status post laparoscopic banding in 2011 and gastric sleeve in 2014. We have reviewed lifestyle modifications for weight loss. We discussed follow-up nutrition. We discussed pharmacologic options for weight loss. She is tolerating Mounjaro and we will continue.\par Lifestyle modification\par Follow-up with nutrition\par Continue Mounjaro 15 mg weekly\par \par Thyroid nodules. She was noted to have right subcentimeter thyroid nodules on ultrasound in December 2019. She has been clinically and biochemically euthyroid. Her mother and sister have a history of thyroid nodules. No family history of thyroid cancer. \par Interval thyroid ultrasound\par \par Return to see me in 4-6 months. \par \par CC:\par Dr. Curt Estrada, Fax [phone]

## 2023-06-12 ENCOUNTER — TRANSCRIPTION ENCOUNTER (OUTPATIENT)
Age: 43
End: 2023-06-12

## 2023-07-05 ENCOUNTER — APPOINTMENT (OUTPATIENT)
Dept: NEPHROLOGY | Facility: CLINIC | Age: 43
End: 2023-07-05

## 2023-07-05 ENCOUNTER — TRANSCRIPTION ENCOUNTER (OUTPATIENT)
Age: 43
End: 2023-07-05

## 2023-07-06 ENCOUNTER — TRANSCRIPTION ENCOUNTER (OUTPATIENT)
Age: 43
End: 2023-07-06

## 2023-07-25 ENCOUNTER — APPOINTMENT (OUTPATIENT)
Dept: NEPHROLOGY | Facility: CLINIC | Age: 43
End: 2023-07-25
Payer: COMMERCIAL

## 2023-07-25 ENCOUNTER — LABORATORY RESULT (OUTPATIENT)
Age: 43
End: 2023-07-25

## 2023-07-25 PROCEDURE — 99214 OFFICE O/P EST MOD 30 MIN: CPT | Mod: 25

## 2023-07-25 PROCEDURE — 36415 COLL VENOUS BLD VENIPUNCTURE: CPT

## 2023-07-25 NOTE — HISTORY OF PRESENT ILLNESS
[FreeTextEntry1] : Kindly referred by Dr. Estrada for CKD. \par \par * eGFR 80s. No sx of kidney stones. Obesity improved on mounjaro. BP controlled. BP 110s at home. No lightheadedness. No CP/SOB.Compliant with medications. * Litholink showed low volume, low citrate. \par \par Previous history (01Feb23): * BP controlled. BP 110s at home. No lightheadedness. No CP/SOB. Compliant with medications. * No kidney stones on topamax. She has stopped this. * Low urine volume. * eGFR by [facility] eGFRcys equation is 100. * Obesity improved on mounjaro. \par \par Previous history (29Dec22): In December 2021 her creatinine was 1.03. U/A nl. Her creatinine increased to 1.19 (eGFR 58) 11/14/22. \par \par She had kidney stones in 2011. On topamax for migraines.  No recent stones or colic. \par \par The patient denies exposure to chronic NSAIDs, chronic PPIs, green smoothies, creatine, or herbal supplements. The patient denies a history of pyelonephritis. No HTN or DM. No significant nocturia. No recent renal ultrasound. They are unaware of proteinuria or hematuria.\par \par Sees Dr. Ramirez for obesity. On Mounjaro. No DM. \par \par She had protein in the urine 20 years ago while pregnant but does not remember being told she had preeclampsia.

## 2023-07-25 NOTE — ASSESSMENT
[FreeTextEntry1] : # Borderline elevated creatinine with normal eGFR.\par * Recheck labs. \par \par # Kidney stones.\par * Maintain high fluid intake.\par * Will consider repeating litholink, addition of citrate. \par * Suspect stopping topamax will increase citrate. \par \par # Obesity.\par * Weight loss. \par * Cont mounjaro.

## 2023-07-27 LAB
ALBUMIN SERPL ELPH-MCNC: 4.2 G/DL
ALP BLD-CCNC: 61 U/L
ALT SERPL-CCNC: 10 U/L
ANION GAP SERPL CALC-SCNC: 12 MMOL/L
APPEARANCE: CLEAR
AST SERPL-CCNC: 17 U/L
BILIRUB SERPL-MCNC: 0.2 MG/DL
BILIRUBIN URINE: NEGATIVE
BLOOD URINE: ABNORMAL
BUN SERPL-MCNC: 14 MG/DL
CALCIUM SERPL-MCNC: 9.6 MG/DL
CHLORIDE SERPL-SCNC: 107 MMOL/L
CO2 SERPL-SCNC: 23 MMOL/L
COLOR: YELLOW
CREAT SERPL-MCNC: 0.9 MG/DL
CREAT SPEC-SCNC: 340 MG/DL
CYSTATIN C SERPL-MCNC: 0.8 MG/L
EGFR: 81 ML/MIN/1.73M2
GFR/BSA.PRED SERPLBLD CYS-BASED-ARV: 104 ML/MIN/1.73M2
GLUCOSE QUALITATIVE U: NEGATIVE MG/DL
GLUCOSE SERPL-MCNC: 70 MG/DL
KETONES URINE: ABNORMAL MG/DL
LEUKOCYTE ESTERASE URINE: ABNORMAL
MICROALBUMIN 24H UR DL<=1MG/L-MCNC: 1.8 MG/DL
MICROALBUMIN/CREAT 24H UR-RTO: 5 MG/G
NITRITE URINE: NEGATIVE
PH URINE: 5.5
POTASSIUM SERPL-SCNC: 4.7 MMOL/L
PROT SERPL-MCNC: 7 G/DL
PROTEIN URINE: NORMAL MG/DL
SODIUM SERPL-SCNC: 142 MMOL/L
SPECIFIC GRAVITY URINE: >1.03
UROBILINOGEN URINE: 0.2 MG/DL

## 2023-08-02 NOTE — REVIEW OF SYSTEMS
[As Noted in HPI] : as noted in HPI [Negative] : Psychiatric Oxybutynin Counseling:  I discussed with the patient the risks of oxybutynin including but not limited to skin rash, drowsiness, dry mouth, difficulty urinating, and blurred vision.

## 2023-08-29 ENCOUNTER — APPOINTMENT (OUTPATIENT)
Dept: NEPHROLOGY | Facility: CLINIC | Age: 43
End: 2023-08-29
Payer: COMMERCIAL

## 2023-08-29 ENCOUNTER — LABORATORY RESULT (OUTPATIENT)
Age: 43
End: 2023-08-29

## 2023-08-29 VITALS — DIASTOLIC BLOOD PRESSURE: 83 MMHG | SYSTOLIC BLOOD PRESSURE: 116 MMHG | HEART RATE: 79 BPM

## 2023-08-29 VITALS — BODY MASS INDEX: 34.45 KG/M2 | WEIGHT: 207 LBS

## 2023-08-29 DIAGNOSIS — R31.21 ASYMPTOMATIC MICROSCOPIC HEMATURIA: ICD-10-CM

## 2023-08-29 PROCEDURE — 99214 OFFICE O/P EST MOD 30 MIN: CPT

## 2023-08-29 NOTE — HISTORY OF PRESENT ILLNESS
[FreeTextEntry1] : Kindly referred by Dr. Estrada for CKD.   * BP controlled.  110s at home. No lightheadedness. No CP/SOB. Compliant with medications. * No  lear  renal colic. * Obesity improved on mounjaro. * Litholink previously showed low volume, low citrate.  * U/A last visit showed microsocpic hematuria. Slight back discomfort (lower left back) not like kidney stone for 3 days about 2 weeks ago.   Previous history (25Jul23): * eGFR 80s. No sx of kidney stones. Obesity improved on mounjaro. BP controlled. BP 110s at home. No lightheadedness. No CP/SOB.Compliant with medications. * Litholink showed low volume, low citrate.   Previous history (01Feb23): * BP controlled. BP 110s at home. No lightheadedness. No CP/SOB. Compliant with medications. * No kidney stones on topamax. She has stopped this. * Low urine volume. * eGFR by [facility] eGFRcys equation is 100. * Obesity improved on mounjaro. * BP controlled.  No lightheadedness. No CP/SOB. Compliant with medications. *  Previous history ([id number]): In December 2021 her creatinine was 1.03. U/A nl. Her creatinine increased to 1.19 (eGFR 58) 11/14/22.   She had kidney stones in 2011. On topamax for migraines.  No recent stones or colic.   The patient denies exposure to chronic NSAIDs, chronic PPIs, green smoothies, creatine, or herbal supplements. The patient denies a history of pyelonephritis. No HTN or DM. No significant nocturia. No recent renal ultrasound. They are unaware of proteinuria or hematuria.  Sees Dr. Ramirez for obesity. On Mounjaro. No DM.   She had protein in the urine 20 years ago while pregnant but does not remember being told she had preeclampsia.

## 2023-08-29 NOTE — ASSESSMENT
[FreeTextEntry1] : # Microscopic hematuria. * Likeliest kidney stone. * Recheck U/A. * Check ultrasound.  # Nephroithiasis. * High fluid intake.  * Will consider potassium citrate. Suspect stopping topamax will increase citrate. * Will recheck litholink. * follow up in 2 - 3 mo.   # Obesity. * Weight loss. * Cont mounjaro.

## 2023-08-30 LAB
APPEARANCE: CLEAR
BILIRUBIN URINE: NEGATIVE
BLOOD URINE: ABNORMAL
COLOR: NORMAL
CREAT SPEC-SCNC: 349 MG/DL
GLUCOSE QUALITATIVE U: NEGATIVE MG/DL
KETONES URINE: ABNORMAL MG/DL
LEUKOCYTE ESTERASE URINE: NEGATIVE
MICROALBUMIN 24H UR DL<=1MG/L-MCNC: 2.6 MG/DL
MICROALBUMIN/CREAT 24H UR-RTO: 7 MG/G
NITRITE URINE: NEGATIVE
PH URINE: 5.5
PROTEIN URINE: NORMAL MG/DL
SPECIFIC GRAVITY URINE: >1.03
UROBILINOGEN URINE: 0.2 MG/DL

## 2023-09-06 LAB — BACTERIA UR CULT: NORMAL

## 2023-09-10 ENCOUNTER — EMERGENCY (EMERGENCY)
Facility: HOSPITAL | Age: 43
LOS: 1 days | Discharge: HOME | End: 2023-09-10
Attending: EMERGENCY MEDICINE | Admitting: EMERGENCY MEDICINE
Payer: COMMERCIAL

## 2023-09-10 VITALS
WEIGHT: 209 LBS | RESPIRATION RATE: 18 BRPM | OXYGEN SATURATION: 95 % | TEMPERATURE: 98 F | SYSTOLIC BLOOD PRESSURE: 123 MMHG | HEIGHT: 65 IN | DIASTOLIC BLOOD PRESSURE: 83 MMHG | HEART RATE: 82 BPM

## 2023-09-10 VITALS
DIASTOLIC BLOOD PRESSURE: 84 MMHG | RESPIRATION RATE: 16 BRPM | HEART RATE: 76 BPM | TEMPERATURE: 99 F | OXYGEN SATURATION: 100 % | SYSTOLIC BLOOD PRESSURE: 125 MMHG

## 2023-09-10 DIAGNOSIS — Z87.19 PERSONAL HISTORY OF OTHER DISEASES OF THE DIGESTIVE SYSTEM: ICD-10-CM

## 2023-09-10 DIAGNOSIS — R10.13 EPIGASTRIC PAIN: ICD-10-CM

## 2023-09-10 DIAGNOSIS — M54.89 OTHER DORSALGIA: ICD-10-CM

## 2023-09-10 DIAGNOSIS — Z98.890 OTHER SPECIFIED POSTPROCEDURAL STATES: Chronic | ICD-10-CM

## 2023-09-10 DIAGNOSIS — Z88.1 ALLERGY STATUS TO OTHER ANTIBIOTIC AGENTS STATUS: ICD-10-CM

## 2023-09-10 DIAGNOSIS — N20.0 CALCULUS OF KIDNEY: ICD-10-CM

## 2023-09-10 DIAGNOSIS — R10.12 LEFT UPPER QUADRANT PAIN: ICD-10-CM

## 2023-09-10 DIAGNOSIS — Z98.89 OTHER SPECIFIED POSTPROCEDURAL STATES: Chronic | ICD-10-CM

## 2023-09-10 LAB
ALBUMIN SERPL ELPH-MCNC: 3.9 G/DL — SIGNIFICANT CHANGE UP (ref 3.3–5)
ALP SERPL-CCNC: 51 U/L — SIGNIFICANT CHANGE UP (ref 40–120)
ALT FLD-CCNC: 8 U/L — LOW (ref 10–45)
ANION GAP SERPL CALC-SCNC: 9 MMOL/L — SIGNIFICANT CHANGE UP (ref 5–17)
APPEARANCE UR: CLEAR — SIGNIFICANT CHANGE UP
AST SERPL-CCNC: 13 U/L — SIGNIFICANT CHANGE UP (ref 10–40)
BASOPHILS # BLD AUTO: 0.04 K/UL — SIGNIFICANT CHANGE UP (ref 0–0.2)
BASOPHILS NFR BLD AUTO: 0.8 % — SIGNIFICANT CHANGE UP (ref 0–2)
BILIRUB SERPL-MCNC: 0.3 MG/DL — SIGNIFICANT CHANGE UP (ref 0.2–1.2)
BILIRUB UR-MCNC: NEGATIVE — SIGNIFICANT CHANGE UP
BUN SERPL-MCNC: 12 MG/DL — SIGNIFICANT CHANGE UP (ref 7–23)
CALCIUM SERPL-MCNC: 9.6 MG/DL — SIGNIFICANT CHANGE UP (ref 8.4–10.5)
CHLORIDE SERPL-SCNC: 106 MMOL/L — SIGNIFICANT CHANGE UP (ref 96–108)
CO2 SERPL-SCNC: 24 MMOL/L — SIGNIFICANT CHANGE UP (ref 22–31)
COLOR SPEC: YELLOW — SIGNIFICANT CHANGE UP
CREAT SERPL-MCNC: 0.81 MG/DL — SIGNIFICANT CHANGE UP (ref 0.5–1.3)
DIFF PNL FLD: NEGATIVE — SIGNIFICANT CHANGE UP
EGFR: 91 ML/MIN/1.73M2 — SIGNIFICANT CHANGE UP
EOSINOPHIL # BLD AUTO: 0.07 K/UL — SIGNIFICANT CHANGE UP (ref 0–0.5)
EOSINOPHIL NFR BLD AUTO: 1.4 % — SIGNIFICANT CHANGE UP (ref 0–6)
GLUCOSE SERPL-MCNC: 85 MG/DL — SIGNIFICANT CHANGE UP (ref 70–99)
GLUCOSE UR QL: NEGATIVE — SIGNIFICANT CHANGE UP
HCT VFR BLD CALC: 38.4 % — SIGNIFICANT CHANGE UP (ref 34.5–45)
HGB BLD-MCNC: 12.5 G/DL — SIGNIFICANT CHANGE UP (ref 11.5–15.5)
IMM GRANULOCYTES NFR BLD AUTO: 0.2 % — SIGNIFICANT CHANGE UP (ref 0–0.9)
KETONES UR-MCNC: NEGATIVE — SIGNIFICANT CHANGE UP
LEUKOCYTE ESTERASE UR-ACNC: NEGATIVE — SIGNIFICANT CHANGE UP
LIDOCAIN IGE QN: 42 U/L — SIGNIFICANT CHANGE UP (ref 7–60)
LYMPHOCYTES # BLD AUTO: 1.8 K/UL — SIGNIFICANT CHANGE UP (ref 1–3.3)
LYMPHOCYTES # BLD AUTO: 35.4 % — SIGNIFICANT CHANGE UP (ref 13–44)
MCHC RBC-ENTMCNC: 28.9 PG — SIGNIFICANT CHANGE UP (ref 27–34)
MCHC RBC-ENTMCNC: 32.6 GM/DL — SIGNIFICANT CHANGE UP (ref 32–36)
MCV RBC AUTO: 88.7 FL — SIGNIFICANT CHANGE UP (ref 80–100)
MONOCYTES # BLD AUTO: 0.38 K/UL — SIGNIFICANT CHANGE UP (ref 0–0.9)
MONOCYTES NFR BLD AUTO: 7.5 % — SIGNIFICANT CHANGE UP (ref 2–14)
NEUTROPHILS # BLD AUTO: 2.79 K/UL — SIGNIFICANT CHANGE UP (ref 1.8–7.4)
NEUTROPHILS NFR BLD AUTO: 54.7 % — SIGNIFICANT CHANGE UP (ref 43–77)
NITRITE UR-MCNC: NEGATIVE — SIGNIFICANT CHANGE UP
NRBC # BLD: 0 /100 WBCS — SIGNIFICANT CHANGE UP (ref 0–0)
PH UR: 5.5 — SIGNIFICANT CHANGE UP (ref 5–8)
PLATELET # BLD AUTO: 304 K/UL — SIGNIFICANT CHANGE UP (ref 150–400)
POTASSIUM SERPL-MCNC: 4.3 MMOL/L — SIGNIFICANT CHANGE UP (ref 3.5–5.3)
POTASSIUM SERPL-SCNC: 4.3 MMOL/L — SIGNIFICANT CHANGE UP (ref 3.5–5.3)
PROT SERPL-MCNC: 7.4 G/DL — SIGNIFICANT CHANGE UP (ref 6–8.3)
PROT UR-MCNC: NEGATIVE MG/DL — SIGNIFICANT CHANGE UP
RBC # BLD: 4.33 M/UL — SIGNIFICANT CHANGE UP (ref 3.8–5.2)
RBC # FLD: 14.4 % — SIGNIFICANT CHANGE UP (ref 10.3–14.5)
SODIUM SERPL-SCNC: 139 MMOL/L — SIGNIFICANT CHANGE UP (ref 135–145)
SP GR SPEC: >=1.03 — SIGNIFICANT CHANGE UP (ref 1–1.03)
UROBILINOGEN FLD QL: 0.2 E.U./DL — SIGNIFICANT CHANGE UP
WBC # BLD: 5.09 K/UL — SIGNIFICANT CHANGE UP (ref 3.8–10.5)
WBC # FLD AUTO: 5.09 K/UL — SIGNIFICANT CHANGE UP (ref 3.8–10.5)

## 2023-09-10 PROCEDURE — 96375 TX/PRO/DX INJ NEW DRUG ADDON: CPT

## 2023-09-10 PROCEDURE — 74177 CT ABD & PELVIS W/CONTRAST: CPT | Mod: 26,MG

## 2023-09-10 PROCEDURE — G1004: CPT

## 2023-09-10 PROCEDURE — 74177 CT ABD & PELVIS W/CONTRAST: CPT | Mod: MG

## 2023-09-10 PROCEDURE — 85025 COMPLETE CBC W/AUTO DIFF WBC: CPT

## 2023-09-10 PROCEDURE — 99285 EMERGENCY DEPT VISIT HI MDM: CPT

## 2023-09-10 PROCEDURE — 81003 URINALYSIS AUTO W/O SCOPE: CPT

## 2023-09-10 PROCEDURE — 99284 EMERGENCY DEPT VISIT MOD MDM: CPT | Mod: 25

## 2023-09-10 PROCEDURE — 80053 COMPREHEN METABOLIC PANEL: CPT

## 2023-09-10 PROCEDURE — 96374 THER/PROPH/DIAG INJ IV PUSH: CPT | Mod: XU

## 2023-09-10 PROCEDURE — 36415 COLL VENOUS BLD VENIPUNCTURE: CPT

## 2023-09-10 PROCEDURE — 83690 ASSAY OF LIPASE: CPT

## 2023-09-10 RX ORDER — MORPHINE SULFATE 50 MG/1
4 CAPSULE, EXTENDED RELEASE ORAL ONCE
Refills: 0 | Status: DISCONTINUED | OUTPATIENT
Start: 2023-09-10 | End: 2023-09-10

## 2023-09-10 RX ORDER — SODIUM CHLORIDE 9 MG/ML
1000 INJECTION INTRAMUSCULAR; INTRAVENOUS; SUBCUTANEOUS ONCE
Refills: 0 | Status: COMPLETED | OUTPATIENT
Start: 2023-09-10 | End: 2023-09-10

## 2023-09-10 RX ORDER — IOHEXOL 300 MG/ML
30 INJECTION, SOLUTION INTRAVENOUS ONCE
Refills: 0 | Status: COMPLETED | OUTPATIENT
Start: 2023-09-10 | End: 2023-09-10

## 2023-09-10 RX ORDER — FAMOTIDINE 10 MG/ML
20 INJECTION INTRAVENOUS ONCE
Refills: 0 | Status: COMPLETED | OUTPATIENT
Start: 2023-09-10 | End: 2023-09-10

## 2023-09-10 RX ORDER — ACETAMINOPHEN 500 MG
1000 TABLET ORAL ONCE
Refills: 0 | Status: COMPLETED | OUTPATIENT
Start: 2023-09-10 | End: 2023-09-10

## 2023-09-10 RX ADMIN — FAMOTIDINE 20 MILLIGRAM(S): 10 INJECTION INTRAVENOUS at 09:31

## 2023-09-10 RX ADMIN — SODIUM CHLORIDE 1000 MILLILITER(S): 9 INJECTION INTRAMUSCULAR; INTRAVENOUS; SUBCUTANEOUS at 09:31

## 2023-09-10 RX ADMIN — Medication 400 MILLIGRAM(S): at 12:49

## 2023-09-10 RX ADMIN — IOHEXOL 30 MILLILITER(S): 300 INJECTION, SOLUTION INTRAVENOUS at 10:16

## 2023-09-10 RX ADMIN — MORPHINE SULFATE 4 MILLIGRAM(S): 50 CAPSULE, EXTENDED RELEASE ORAL at 09:31

## 2023-09-10 NOTE — ED ADULT TRIAGE NOTE - AS PAIN REST
What Type Of Note Output Would You Prefer (Optional)?: Bullet Format How Severe Is Your Acne?: mild Is This A New Presentation, Or A Follow-Up?: Acne Additional Comments (Use Complete Sentences): Patient likes Retin-A cream just moved to [place] trying to establish a new doctor. 7 (severe pain)

## 2023-09-10 NOTE — ED ADULT NURSE NOTE - NSFALLUNIVINTERV_ED_ALL_ED
Bed/Stretcher in lowest position, wheels locked, appropriate side rails in place/Call bell, personal items and telephone in reach/Instruct patient to call for assistance before getting out of bed/chair/stretcher/Non-slip footwear applied when patient is off stretcher/[place] to call system/Physically safe environment - no spills, clutter or unnecessary equipment/Purposeful proactive rounding/Room/bathroom lighting operational, light cord in reach

## 2023-09-10 NOTE — ED PROVIDER NOTE - NSFOLLOWUPINSTRUCTIONS_ED_ALL_ED_FT
Abdominal Pain, Adult  follow a bland diet (small frequent meals, avoid spicy/fried foods, keep self well hydrated, avoid caffeine and alcohol, do not eat 4 hrs before bedtime), take pepcid twice a day, tylenol as needed, follow up with gi and urologist  Pain in the abdomen (abdominal pain) can be caused by many things. Often, abdominal pain is not serious and it gets better with no treatment or by being treated at home. However, sometimes abdominal pain is serious.  Your health care provider will ask questions about your medical history and do a physical exam to try to determine the cause of your abdominal pain.  Follow these instructions at home:  Medicines  Take over-the-counter and prescription medicines only as told by your health care provider.  Do not take a laxative unless told by your health care provider.  General instructions  Watch your condition for any changes.  Drink enough fluid to keep your urine pale yellow.  Keep all follow-up visits as told by your health care provider. This is important.  Contact a health care provider if:  Your abdominal pain changes or gets worse.  You are not hungry or you lose weight without trying.  You are constipated or have diarrhea for more than 2–3 days.  You have pain when you urinate or have a bowel movement.  Your abdominal pain wakes you up at night.  Your pain gets worse with meals, after eating, or with certain foods.  You are vomiting and cannot keep anything down.  You have a fever.  You have blood in your urine.  Get help right away if:  Your pain does not go away as soon as your health care provider told you to expect.  You cannot stop vomiting.  Your pain is only in areas of the abdomen, such as the right side or the left lower portion of the abdomen. Pain on the right side could be caused by appendicitis.  You have bloody or black stools, or stools that look like tar.  You have severe pain, cramping, or bloating in your abdomen.  You have signs of dehydration, such as:  Dark urine, very little urine, or no urine.  Cracked lips.  Dry mouth.  Sunken eyes.  Sleepiness.  Weakness.  You have trouble breathing or chest pain.  Summary  Often, abdominal pain is not serious and it gets better with no treatment or by being treated at home. However, sometimes abdominal pain is serious.  Watch your condition for any changes.  Take over-the-counter and prescription medicines only as told by your health care provider.  Contact a health care provider if your abdominal pain changes or gets worse.  Get help right away if you have severe pain, cramping, or bloating in your abdomen.

## 2023-09-10 NOTE — ED ADULT NURSE NOTE - OBJECTIVE STATEMENT
Patient is a 43yoF presenting to the ED for left flank pain. Patient is awake and alert, spont breathing. States that she has left flank pain, states that she has had kidney stones in the past, and feels similar to it. Denies urinary sx, denies abd pain. No nausea/vomiting.

## 2023-09-10 NOTE — ED ADULT TRIAGE NOTE - HEART RATE (BEATS/MIN)
Unfortunately both of the specimens from today are slightly hemolyzed  She has had this a documented issue from [facility] as well  On admission her K was 5.3 and 5.4, which correlates with most of her other levels recently, so suspect it's correct    -resolved  -lokelma stopped       82

## 2023-09-10 NOTE — ED PROVIDER NOTE - OBJECTIVE STATEMENT
The pt is a 42 y/o F, who presents to ED c/o L sided back pain x 3 d. Pt states pain is constant, sharp, 8/10, non radiating, has been taking tyl around the clock w/min relief - last dose 3 hrs ago, hx of pancreatitis (not etoh or gallstone related) and kidney stones. Denies fevers, chills, n/v/d, dysuria, hematuria, dizziness, syncope.

## 2023-09-10 NOTE — ED PROVIDER NOTE - PATIENT PORTAL LINK FT
You can access the FollowMyHealth Patient Portal offered by [facility] by registering at the following website: [email or web address]. By joining [facility]’s FollowMyHealth portal, you will also be able to view your health information using other applications (apps) compatible with our system.

## 2023-09-10 NOTE — ED PROVIDER NOTE - PRINCIPAL DIAGNOSIS
10/15 @ 8126, Tried reaching patient at [phone] regarding colonoscopy bowel prep instructions. Left message informing patient to contact office at [phone]       10/15 @ 5767, Patient returned call regarding preparation for colonoscopy scheduled Wednesday 10/17/2018. Verified time of arrival at 10:15am, with procedure time at 11:30am arriving to [facility]. [facility], dietary modifications, Golytely bowel preparation, and escort needed for after procedure. Patient verbalizing understanding of all instructions and questions were answered. Patient informed to contact office at [phone] with any additional questions.        Signatures   Electronically signed by : Tasha Mcneill, ; Oct 15 2018  1:59PM CST     Abdominal pain

## 2023-09-10 NOTE — ED PROVIDER NOTE - DISCHARGE REVIEW MATERIAL PRESENTED
Problem: Ineffective Coping  Goal: Participates in unit activities  Description: Interventions:  - Provide therapeutic environment   - Provide required programming   - Redirect inappropriate behaviors   Outcome: Not Progressing   Patient not progressing in group setting; but continues to do better with 1:1 interactions and has even been open to eat dinner meal in dining room  He remains uncomfortable around larger numbers of people  .

## 2023-09-10 NOTE — ED PROVIDER NOTE - DISCHARGE DATE
1. Have you been to the ER, urgent care clinic since your last visit? Hospitalized since your last visit? No    2. Have you seen or consulted any other health care providers outside of the [address] [place] [address] since your last visit? Include any pap smears or colon screening.  No 10-Sep-2023

## 2023-09-10 NOTE — ED ADULT NURSE REASSESSMENT NOTE - NS ED NURSE REASSESS COMMENT FT1
Pt found awake and alert, in no respiratory distress. Reports pain not relieved by Tylenol. Pending urine pregnancy test.

## 2023-09-10 NOTE — ED PROVIDER NOTE - NS ED ATTENDING STATEMENT MOD
This was a shared visit with the STANISLAW. I reviewed and verified the documentation and independently performed the documented:

## 2023-09-10 NOTE — ED PROVIDER NOTE - CLINICAL SUMMARY MEDICAL DECISION MAKING FREE TEXT BOX
pt c/o l upper abd pain x few d, hx of pancreatitis and kidney stones, has been taking tyl w/min relief, no associated n/v/d/fevers/chills/dysuria, + min epigastric and luq tend on exam, ua neg, labs wnl, ct w/contrast done and no acute pathology - tiny renal stone but no pancreatitis / sbo / gallstones, pain controlled, will dc w/diet modifications and to take h2 blocker plus f/u w/gi and gu, pt understands and agrees w/plan, strict return precautions given

## 2023-09-10 NOTE — ED ADULT NURSE REASSESSMENT NOTE - NS ED NURSE REASSESS COMMENT FT1
Pt reports feeling itchiness around her mouth, on her hands and on her shoulder. Reports that she has had this happen before when she received pain medications during labor. Denies hx of anaphylaxis. Speaking in full sentences, denies SOB, no audible wheeze. No rash or swelling noted to mouth or throat. LISA Gutiérrez aware. No interventions at this time.

## 2023-09-10 NOTE — ED PROVIDER NOTE - ATTENDING APP SHARED VISIT CONTRIBUTION OF CARE
I discussed the plan of care of the patient directly with the PA while the patient was in the Emergency Department. I have reviewed the ACP note and agree with the history, exam and plan of care. Here w/ L sided abd pain in setting of hx of kidney stones and pancreatitis. UA neg. CT a/p done and +kidney stone. DC home in NAD with strict return precautions given, to f/u  and GI

## 2023-09-25 ENCOUNTER — RX RENEWAL (OUTPATIENT)
Age: 43
End: 2023-09-25

## 2023-10-02 ENCOUNTER — TRANSCRIPTION ENCOUNTER (OUTPATIENT)
Age: 43
End: 2023-10-02

## 2023-10-26 ENCOUNTER — EMERGENCY (EMERGENCY)
Facility: HOSPITAL | Age: 43
LOS: 1 days | Discharge: HOME | End: 2023-10-26
Attending: EMERGENCY MEDICINE | Admitting: EMERGENCY MEDICINE
Payer: COMMERCIAL

## 2023-10-26 VITALS
HEART RATE: 82 BPM | WEIGHT: 207.01 LBS | DIASTOLIC BLOOD PRESSURE: 84 MMHG | OXYGEN SATURATION: 100 % | HEIGHT: 65 IN | TEMPERATURE: 98 F | SYSTOLIC BLOOD PRESSURE: 118 MMHG | RESPIRATION RATE: 17 BRPM

## 2023-10-26 VITALS
RESPIRATION RATE: 18 BRPM | HEART RATE: 71 BPM | SYSTOLIC BLOOD PRESSURE: 119 MMHG | OXYGEN SATURATION: 99 % | TEMPERATURE: 98 F | DIASTOLIC BLOOD PRESSURE: 82 MMHG

## 2023-10-26 DIAGNOSIS — Z87.19 PERSONAL HISTORY OF OTHER DISEASES OF THE DIGESTIVE SYSTEM: ICD-10-CM

## 2023-10-26 DIAGNOSIS — R10.12 LEFT UPPER QUADRANT PAIN: ICD-10-CM

## 2023-10-26 DIAGNOSIS — Z86.010 PERSONAL HISTORY OF COLONIC POLYPS: ICD-10-CM

## 2023-10-26 DIAGNOSIS — Z98.890 OTHER SPECIFIED POSTPROCEDURAL STATES: Chronic | ICD-10-CM

## 2023-10-26 DIAGNOSIS — Z98.89 OTHER SPECIFIED POSTPROCEDURAL STATES: Chronic | ICD-10-CM

## 2023-10-26 DIAGNOSIS — Z87.09 PERSONAL HISTORY OF OTHER DISEASES OF THE RESPIRATORY SYSTEM: ICD-10-CM

## 2023-10-26 DIAGNOSIS — Z86.79 PERSONAL HISTORY OF OTHER DISEASES OF THE CIRCULATORY SYSTEM: ICD-10-CM

## 2023-10-26 DIAGNOSIS — Z87.42 PERSONAL HISTORY OF OTHER DISEASES OF THE FEMALE GENITAL TRACT: ICD-10-CM

## 2023-10-26 DIAGNOSIS — G43.909 MIGRAINE, UNSPECIFIED, NOT INTRACTABLE, WITHOUT STATUS MIGRAINOSUS: ICD-10-CM

## 2023-10-26 DIAGNOSIS — Z88.1 ALLERGY STATUS TO OTHER ANTIBIOTIC AGENTS STATUS: ICD-10-CM

## 2023-10-26 LAB
ALBUMIN SERPL ELPH-MCNC: 3.6 G/DL — SIGNIFICANT CHANGE UP (ref 3.3–5)
ALP SERPL-CCNC: 51 U/L — SIGNIFICANT CHANGE UP (ref 40–120)
ALP SERPL-CCNC: 51 U/L — SIGNIFICANT CHANGE UP (ref 40–120)
ALP SERPL-CCNC: 53 U/L — SIGNIFICANT CHANGE UP (ref 40–120)
ALP SERPL-CCNC: 53 U/L — SIGNIFICANT CHANGE UP (ref 40–120)
ALT FLD-CCNC: 8 U/L — LOW (ref 10–45)
ALT FLD-CCNC: 8 U/L — LOW (ref 10–45)
ALT FLD-CCNC: SIGNIFICANT CHANGE UP (ref 10–45)
ALT FLD-CCNC: SIGNIFICANT CHANGE UP (ref 10–45)
ANION GAP SERPL CALC-SCNC: 11 MMOL/L — SIGNIFICANT CHANGE UP (ref 5–17)
ANION GAP SERPL CALC-SCNC: 11 MMOL/L — SIGNIFICANT CHANGE UP (ref 5–17)
ANION GAP SERPL CALC-SCNC: 7 MMOL/L — SIGNIFICANT CHANGE UP (ref 5–17)
ANION GAP SERPL CALC-SCNC: 7 MMOL/L — SIGNIFICANT CHANGE UP (ref 5–17)
AST SERPL-CCNC: 13 U/L — SIGNIFICANT CHANGE UP (ref 10–40)
AST SERPL-CCNC: 13 U/L — SIGNIFICANT CHANGE UP (ref 10–40)
AST SERPL-CCNC: SIGNIFICANT CHANGE UP (ref 10–40)
AST SERPL-CCNC: SIGNIFICANT CHANGE UP (ref 10–40)
BASOPHILS # BLD AUTO: 0.02 K/UL — SIGNIFICANT CHANGE UP (ref 0–0.2)
BASOPHILS # BLD AUTO: 0.02 K/UL — SIGNIFICANT CHANGE UP (ref 0–0.2)
BASOPHILS NFR BLD AUTO: 0.6 % — SIGNIFICANT CHANGE UP (ref 0–2)
BASOPHILS NFR BLD AUTO: 0.6 % — SIGNIFICANT CHANGE UP (ref 0–2)
BILIRUB SERPL-MCNC: 0.2 MG/DL — SIGNIFICANT CHANGE UP (ref 0.2–1.2)
BILIRUB SERPL-MCNC: 0.2 MG/DL — SIGNIFICANT CHANGE UP (ref 0.2–1.2)
BILIRUB SERPL-MCNC: 0.3 MG/DL — SIGNIFICANT CHANGE UP (ref 0.2–1.2)
BILIRUB SERPL-MCNC: 0.3 MG/DL — SIGNIFICANT CHANGE UP (ref 0.2–1.2)
BUN SERPL-MCNC: 10 MG/DL — SIGNIFICANT CHANGE UP (ref 7–23)
BUN SERPL-MCNC: 10 MG/DL — SIGNIFICANT CHANGE UP (ref 7–23)
BUN SERPL-MCNC: 8 MG/DL — SIGNIFICANT CHANGE UP (ref 7–23)
BUN SERPL-MCNC: 8 MG/DL — SIGNIFICANT CHANGE UP (ref 7–23)
CALCIUM SERPL-MCNC: 9 MG/DL — SIGNIFICANT CHANGE UP (ref 8.4–10.5)
CALCIUM SERPL-MCNC: 9 MG/DL — SIGNIFICANT CHANGE UP (ref 8.4–10.5)
CALCIUM SERPL-MCNC: 9.3 MG/DL — SIGNIFICANT CHANGE UP (ref 8.4–10.5)
CALCIUM SERPL-MCNC: 9.3 MG/DL — SIGNIFICANT CHANGE UP (ref 8.4–10.5)
CHLORIDE SERPL-SCNC: 103 MMOL/L — SIGNIFICANT CHANGE UP (ref 96–108)
CHLORIDE SERPL-SCNC: 103 MMOL/L — SIGNIFICANT CHANGE UP (ref 96–108)
CHLORIDE SERPL-SCNC: 106 MMOL/L — SIGNIFICANT CHANGE UP (ref 96–108)
CHLORIDE SERPL-SCNC: 106 MMOL/L — SIGNIFICANT CHANGE UP (ref 96–108)
CO2 SERPL-SCNC: 24 MMOL/L — SIGNIFICANT CHANGE UP (ref 22–31)
CO2 SERPL-SCNC: 24 MMOL/L — SIGNIFICANT CHANGE UP (ref 22–31)
CO2 SERPL-SCNC: 26 MMOL/L — SIGNIFICANT CHANGE UP (ref 22–31)
CO2 SERPL-SCNC: 26 MMOL/L — SIGNIFICANT CHANGE UP (ref 22–31)
CREAT SERPL-MCNC: 0.84 MG/DL — SIGNIFICANT CHANGE UP (ref 0.5–1.3)
EGFR: 87 ML/MIN/1.73M2 — SIGNIFICANT CHANGE UP
EOSINOPHIL # BLD AUTO: 0.08 K/UL — SIGNIFICANT CHANGE UP (ref 0–0.5)
EOSINOPHIL # BLD AUTO: 0.08 K/UL — SIGNIFICANT CHANGE UP (ref 0–0.5)
EOSINOPHIL NFR BLD AUTO: 2.4 % — SIGNIFICANT CHANGE UP (ref 0–6)
EOSINOPHIL NFR BLD AUTO: 2.4 % — SIGNIFICANT CHANGE UP (ref 0–6)
GLUCOSE SERPL-MCNC: 105 MG/DL — HIGH (ref 70–99)
GLUCOSE SERPL-MCNC: 105 MG/DL — HIGH (ref 70–99)
GLUCOSE SERPL-MCNC: 85 MG/DL — SIGNIFICANT CHANGE UP (ref 70–99)
GLUCOSE SERPL-MCNC: 85 MG/DL — SIGNIFICANT CHANGE UP (ref 70–99)
HCG SERPL-ACNC: 1 MIU/ML — SIGNIFICANT CHANGE UP
HCG SERPL-ACNC: 1 MIU/ML — SIGNIFICANT CHANGE UP
HCT VFR BLD CALC: 36.1 % — SIGNIFICANT CHANGE UP (ref 34.5–45)
HCT VFR BLD CALC: 36.1 % — SIGNIFICANT CHANGE UP (ref 34.5–45)
HGB BLD-MCNC: 12.1 G/DL — SIGNIFICANT CHANGE UP (ref 11.5–15.5)
HGB BLD-MCNC: 12.1 G/DL — SIGNIFICANT CHANGE UP (ref 11.5–15.5)
IMM GRANULOCYTES NFR BLD AUTO: 0.3 % — SIGNIFICANT CHANGE UP (ref 0–0.9)
IMM GRANULOCYTES NFR BLD AUTO: 0.3 % — SIGNIFICANT CHANGE UP (ref 0–0.9)
LIDOCAIN IGE QN: 191 U/L — HIGH (ref 7–60)
LIDOCAIN IGE QN: 191 U/L — HIGH (ref 7–60)
LYMPHOCYTES # BLD AUTO: 1.69 K/UL — SIGNIFICANT CHANGE UP (ref 1–3.3)
LYMPHOCYTES # BLD AUTO: 1.69 K/UL — SIGNIFICANT CHANGE UP (ref 1–3.3)
LYMPHOCYTES # BLD AUTO: 50.1 % — HIGH (ref 13–44)
LYMPHOCYTES # BLD AUTO: 50.1 % — HIGH (ref 13–44)
MAGNESIUM SERPL-MCNC: 2 MG/DL — SIGNIFICANT CHANGE UP (ref 1.6–2.6)
MAGNESIUM SERPL-MCNC: 2 MG/DL — SIGNIFICANT CHANGE UP (ref 1.6–2.6)
MCHC RBC-ENTMCNC: 28.7 PG — SIGNIFICANT CHANGE UP (ref 27–34)
MCHC RBC-ENTMCNC: 28.7 PG — SIGNIFICANT CHANGE UP (ref 27–34)
MCHC RBC-ENTMCNC: 33.5 GM/DL — SIGNIFICANT CHANGE UP (ref 32–36)
MCHC RBC-ENTMCNC: 33.5 GM/DL — SIGNIFICANT CHANGE UP (ref 32–36)
MCV RBC AUTO: 85.7 FL — SIGNIFICANT CHANGE UP (ref 80–100)
MCV RBC AUTO: 85.7 FL — SIGNIFICANT CHANGE UP (ref 80–100)
MONOCYTES # BLD AUTO: 0.21 K/UL — SIGNIFICANT CHANGE UP (ref 0–0.9)
MONOCYTES # BLD AUTO: 0.21 K/UL — SIGNIFICANT CHANGE UP (ref 0–0.9)
MONOCYTES NFR BLD AUTO: 6.2 % — SIGNIFICANT CHANGE UP (ref 2–14)
MONOCYTES NFR BLD AUTO: 6.2 % — SIGNIFICANT CHANGE UP (ref 2–14)
NEUTROPHILS # BLD AUTO: 1.36 K/UL — LOW (ref 1.8–7.4)
NEUTROPHILS # BLD AUTO: 1.36 K/UL — LOW (ref 1.8–7.4)
NEUTROPHILS NFR BLD AUTO: 40.4 % — LOW (ref 43–77)
NEUTROPHILS NFR BLD AUTO: 40.4 % — LOW (ref 43–77)
NRBC # BLD: 0 /100 WBCS — SIGNIFICANT CHANGE UP (ref 0–0)
NRBC # BLD: 0 /100 WBCS — SIGNIFICANT CHANGE UP (ref 0–0)
PLATELET # BLD AUTO: 270 K/UL — SIGNIFICANT CHANGE UP (ref 150–400)
PLATELET # BLD AUTO: 270 K/UL — SIGNIFICANT CHANGE UP (ref 150–400)
POTASSIUM SERPL-MCNC: 4.1 MMOL/L — SIGNIFICANT CHANGE UP (ref 3.5–5.3)
POTASSIUM SERPL-MCNC: 4.1 MMOL/L — SIGNIFICANT CHANGE UP (ref 3.5–5.3)
POTASSIUM SERPL-MCNC: SIGNIFICANT CHANGE UP (ref 3.5–5.3)
POTASSIUM SERPL-MCNC: SIGNIFICANT CHANGE UP (ref 3.5–5.3)
POTASSIUM SERPL-SCNC: 4.1 MMOL/L — SIGNIFICANT CHANGE UP (ref 3.5–5.3)
POTASSIUM SERPL-SCNC: 4.1 MMOL/L — SIGNIFICANT CHANGE UP (ref 3.5–5.3)
POTASSIUM SERPL-SCNC: SIGNIFICANT CHANGE UP (ref 3.5–5.3)
POTASSIUM SERPL-SCNC: SIGNIFICANT CHANGE UP (ref 3.5–5.3)
PROT SERPL-MCNC: 6.6 G/DL — SIGNIFICANT CHANGE UP (ref 6–8.3)
PROT SERPL-MCNC: 6.6 G/DL — SIGNIFICANT CHANGE UP (ref 6–8.3)
PROT SERPL-MCNC: 7.2 G/DL — SIGNIFICANT CHANGE UP (ref 6–8.3)
PROT SERPL-MCNC: 7.2 G/DL — SIGNIFICANT CHANGE UP (ref 6–8.3)
RBC # BLD: 4.21 M/UL — SIGNIFICANT CHANGE UP (ref 3.8–5.2)
RBC # BLD: 4.21 M/UL — SIGNIFICANT CHANGE UP (ref 3.8–5.2)
RBC # FLD: 13.5 % — SIGNIFICANT CHANGE UP (ref 10.3–14.5)
RBC # FLD: 13.5 % — SIGNIFICANT CHANGE UP (ref 10.3–14.5)
SODIUM SERPL-SCNC: 138 MMOL/L — SIGNIFICANT CHANGE UP (ref 135–145)
SODIUM SERPL-SCNC: 138 MMOL/L — SIGNIFICANT CHANGE UP (ref 135–145)
SODIUM SERPL-SCNC: 139 MMOL/L — SIGNIFICANT CHANGE UP (ref 135–145)
SODIUM SERPL-SCNC: 139 MMOL/L — SIGNIFICANT CHANGE UP (ref 135–145)
WBC # BLD: 3.37 K/UL — LOW (ref 3.8–10.5)
WBC # BLD: 3.37 K/UL — LOW (ref 3.8–10.5)
WBC # FLD AUTO: 3.37 K/UL — LOW (ref 3.8–10.5)
WBC # FLD AUTO: 3.37 K/UL — LOW (ref 3.8–10.5)

## 2023-10-26 PROCEDURE — 80053 COMPREHEN METABOLIC PANEL: CPT

## 2023-10-26 PROCEDURE — 85025 COMPLETE CBC W/AUTO DIFF WBC: CPT

## 2023-10-26 PROCEDURE — 99285 EMERGENCY DEPT VISIT HI MDM: CPT

## 2023-10-26 PROCEDURE — 83690 ASSAY OF LIPASE: CPT

## 2023-10-26 PROCEDURE — 36415 COLL VENOUS BLD VENIPUNCTURE: CPT

## 2023-10-26 PROCEDURE — 84702 CHORIONIC GONADOTROPIN TEST: CPT

## 2023-10-26 PROCEDURE — 96375 TX/PRO/DX INJ NEW DRUG ADDON: CPT

## 2023-10-26 PROCEDURE — 74177 CT ABD & PELVIS W/CONTRAST: CPT | Mod: 26,MA

## 2023-10-26 PROCEDURE — 74177 CT ABD & PELVIS W/CONTRAST: CPT | Mod: MA

## 2023-10-26 PROCEDURE — 99284 EMERGENCY DEPT VISIT MOD MDM: CPT | Mod: 25

## 2023-10-26 PROCEDURE — 96374 THER/PROPH/DIAG INJ IV PUSH: CPT | Mod: XU

## 2023-10-26 PROCEDURE — 83735 ASSAY OF MAGNESIUM: CPT

## 2023-10-26 RX ORDER — ACETAMINOPHEN 500 MG
1000 TABLET ORAL ONCE
Refills: 0 | Status: COMPLETED | OUTPATIENT
Start: 2023-10-26 | End: 2023-10-26

## 2023-10-26 RX ORDER — MORPHINE SULFATE 50 MG/1
4 CAPSULE, EXTENDED RELEASE ORAL ONCE
Refills: 0 | Status: DISCONTINUED | OUTPATIENT
Start: 2023-10-26 | End: 2023-10-26

## 2023-10-26 RX ORDER — IOHEXOL 300 MG/ML
30 INJECTION, SOLUTION INTRAVENOUS ONCE
Refills: 0 | Status: COMPLETED | OUTPATIENT
Start: 2023-10-26 | End: 2023-10-26

## 2023-10-26 RX ADMIN — IOHEXOL 30 MILLILITER(S): 300 INJECTION, SOLUTION INTRAVENOUS at 06:20

## 2023-10-26 RX ADMIN — MORPHINE SULFATE 4 MILLIGRAM(S): 50 CAPSULE, EXTENDED RELEASE ORAL at 06:30

## 2023-10-26 RX ADMIN — Medication 400 MILLIGRAM(S): at 09:03

## 2023-10-26 NOTE — ED ADULT TRIAGE NOTE - CHIEF COMPLAINT QUOTE
Pt presents with c/o "LUQ pain with radiation to back" since Monday night, post colonoscopy. Pt reported pain to GI, told to take Tylenol and Gas X. Pt states s/s have worsened, with no call back from GI. Denies any N/V/D, urinary s/s, SOB or c/p.

## 2023-10-26 NOTE — ED PROVIDER NOTE - PATIENT PORTAL LINK FT
You can access the FollowMyHealth Patient Portal offered by [facility] by registering at the following website: [email or web address]. By joining [facility]’s FollowMyHealth portal, you will also be able to view your health information using other applications (apps) compatible with our system.

## 2023-10-26 NOTE — ED PROVIDER NOTE - OBJECTIVE STATEMENT
43F hx asthma, c/o LUQ abd pain. pt states had colonoscopy on 10/23. per report pt had polyp removed from ascending colon. states after having some upper abd pain. states pain radiating to back. now with pain to mostly LUQ. no vomiting, no diarrhea. no fevers. no sick contacts.

## 2023-10-26 NOTE — ED PROVIDER NOTE - NSICDXFAMILYHX_GEN_ALL_CORE_FT
FAMILY HISTORY:  Father  Still living? Unknown  Family history of pulmonary embolism, Age at diagnosis: Age Unknown    Mother  Still living? Unknown  Family history of hypertension, Age at diagnosis: Age Unknown

## 2023-10-26 NOTE — ED PROVIDER NOTE - CLINICAL SUMMARY MEDICAL DECISION MAKING FREE TEXT BOX
LUQ abd pain, recent colonoscopy with polypectomy, no guarding, no rebound, afebrile  gastritis vs perforation vs kidney stone  -check labs  -morphine  -CT a/p

## 2023-10-26 NOTE — ED ADULT NURSE NOTE - NSFALLHARMRISKINTERV_ED_ALL_ED

## 2023-10-26 NOTE — ED ADULT NURSE NOTE - OBJECTIVE STATEMENT
44 yo c/o abdominal pain. Reports had a colonoscopy last week and has then had pain. Denies fevers, chills, NVD, dizziness, HA, numbness and tingling. No pertinent PMHx.

## 2023-10-26 NOTE — ED PROVIDER NOTE - PROGRESS NOTE DETAILS
Ree: pt received from Dr. Stout at s/o; pt is s/p screening colonoscopy 3d ago, who p/w upper abd pain ever since. CT a/p performed to r/o microperf; results pending. Dispo pending ct read and pt re-evaluation. Will continue to monitor. CT a/p neg, pt comfortable, will d/c to f/u with her GI/pmd

## 2023-10-26 NOTE — ED PROVIDER NOTE - NSICDXPASTSURGICALHX_GEN_ALL_CORE_FT
PAST SURGICAL HISTORY:  History of gastric surgery     Status post embolization of uterine artery

## 2023-12-04 ENCOUNTER — APPOINTMENT (OUTPATIENT)
Dept: NEUROLOGY | Facility: CLINIC | Age: 43
End: 2023-12-04
Payer: COMMERCIAL

## 2023-12-04 VITALS
HEART RATE: 92 BPM | DIASTOLIC BLOOD PRESSURE: 84 MMHG | TEMPERATURE: 97.9 F | SYSTOLIC BLOOD PRESSURE: 120 MMHG | WEIGHT: 201 LBS | HEIGHT: 65 IN | OXYGEN SATURATION: 96 % | BODY MASS INDEX: 33.49 KG/M2

## 2023-12-04 DIAGNOSIS — G43.909 MIGRAINE, UNSPECIFIED, NOT INTRACTABLE, W/OUT STATUS MIGRAINOSUS: ICD-10-CM

## 2023-12-04 DIAGNOSIS — G37.9 DEMYELINATING DISEASE OF CENTRAL NERVOUS SYSTEM, UNSPECIFIED: ICD-10-CM

## 2023-12-04 PROCEDURE — 99214 OFFICE O/P EST MOD 30 MIN: CPT

## 2023-12-04 RX ORDER — TIRZEPATIDE 15 MG/.5ML
15 INJECTION, SOLUTION SUBCUTANEOUS
Qty: 3 | Refills: 1 | Status: DISCONTINUED | COMMUNITY
Start: 2022-08-24 | End: 2023-12-04

## 2024-01-02 ENCOUNTER — LABORATORY RESULT (OUTPATIENT)
Age: 44
End: 2024-01-02

## 2024-01-02 ENCOUNTER — APPOINTMENT (OUTPATIENT)
Dept: NEPHROLOGY | Facility: CLINIC | Age: 44
End: 2024-01-02
Payer: COMMERCIAL

## 2024-01-02 VITALS — BODY MASS INDEX: 32.95 KG/M2 | WEIGHT: 198 LBS

## 2024-01-02 VITALS — DIASTOLIC BLOOD PRESSURE: 90 MMHG | HEART RATE: 77 BPM | SYSTOLIC BLOOD PRESSURE: 125 MMHG

## 2024-01-02 DIAGNOSIS — R79.89 OTHER SPECIFIED ABNORMAL FINDINGS OF BLOOD CHEMISTRY: ICD-10-CM

## 2024-01-02 PROCEDURE — 99214 OFFICE O/P EST MOD 30 MIN: CPT

## 2024-01-04 LAB
APPEARANCE: CLEAR
BILIRUBIN URINE: NEGATIVE
BLOOD URINE: ABNORMAL
COLOR: YELLOW
CREAT SPEC-SCNC: 192 MG/DL
GLUCOSE QUALITATIVE U: NEGATIVE MG/DL
KETONES URINE: ABNORMAL MG/DL
LEUKOCYTE ESTERASE URINE: ABNORMAL
MICROALBUMIN 24H UR DL<=1MG/L-MCNC: 6.7 MG/DL
MICROALBUMIN/CREAT 24H UR-RTO: 35 MG/G
NITRITE URINE: NEGATIVE
PH URINE: 6
PROTEIN URINE: 30 MG/DL
SPECIFIC GRAVITY URINE: >1.03
UROBILINOGEN URINE: 1 MG/DL

## 2024-01-04 NOTE — ASSESSMENT
[FreeTextEntry1] :   # Microscopic hematuria. * Likeliest kidney stone. * Recheck U/A. * Recheck  ultrasound.  # Nephroithiasis. * High fluid intake. * Will recheck litholink. * Follow up in 2 - 3 mo.   # Obesity. * Weight loss. * Cont mounjaro.

## 2024-01-04 NOTE — HISTORY OF PRESENT ILLNESS
[FreeTextEntry1] : Kindly referred by Dr. Estrada for CKD.   * Went to ER in September 2023 for pain and found to have small nonobstructing 4 mm left kidney stone. Pain resolved. * BP controlled.  - 120s at home. No lightheadedness. No CP/SOB. Compliant with medications. * Now off topamax. * Weight improved on mounjaro.   Previous history ([id number]): * BP controlled.  110s at home. No lightheadedness. No CP/SOB. Compliant with medications. * No  lear  renal colic. * Obesity improved on mounjaro. * Litholink previously showed low volume, low citrate.  * U/A last visit showed microsocpic hematuria. Slight back discomfort (lower left back) not like kidney stone for 3 days about 2 weeks ago.   Previous history (25Jul23): * eGFR 80s. No sx of kidney stones. Obesity improved on mounjaro. BP controlled. BP 110s at home. No lightheadedness. No CP/SOB.Compliant with medications. * Litholink showed low volume, low citrate.   Previous history (01Feb23): * BP controlled. BP 110s at home. No lightheadedness. No CP/SOB. Compliant with medications. * No kidney stones on topamax. She has stopped this. * Low urine volume. * eGFR by EK eGFRcys equation is 100. * Obesity improved on mounjaro. * BP controlled.  No lightheadedness. No CP/SOB. Compliant with medications. *  Previous history ([id number]): In December 2021 her creatinine was 1.03. U/A nl. Her creatinine increased to 1.19 (eGFR 58) 11/14/22.   She had kidney stones in 2011. On topamax for migraines.  No recent stones or colic.   The patient denies exposure to chronic NSAIDs, chronic PPIs, green smoothies, creatine, or herbal supplements. The patient denies a history of pyelonephritis. No HTN or DM. No significant nocturia. No recent renal ultrasound. They are unaware of proteinuria or hematuria.  Sees Dr. Ramirez for obesity. On Mounjaro. No DM.   She had protein in the urine 20 years ago while pregnant but does not remember being told she had preeclampsia.

## 2024-01-08 ENCOUNTER — APPOINTMENT (OUTPATIENT)
Dept: ULTRASOUND IMAGING | Facility: CLINIC | Age: 44
End: 2024-01-08
Payer: COMMERCIAL

## 2024-01-08 ENCOUNTER — TRANSCRIPTION ENCOUNTER (OUTPATIENT)
Age: 44
End: 2024-01-08

## 2024-01-08 PROCEDURE — 76770 US EXAM ABDO BACK WALL COMP: CPT

## 2024-01-10 ENCOUNTER — APPOINTMENT (OUTPATIENT)
Dept: UROLOGY | Facility: CLINIC | Age: 44
End: 2024-01-10
Payer: COMMERCIAL

## 2024-01-10 VITALS
DIASTOLIC BLOOD PRESSURE: 86 MMHG | HEART RATE: 85 BPM | TEMPERATURE: 97.2 F | WEIGHT: 198 LBS | BODY MASS INDEX: 32.99 KG/M2 | SYSTOLIC BLOOD PRESSURE: 127 MMHG | OXYGEN SATURATION: 96 % | HEIGHT: 65 IN

## 2024-01-10 PROCEDURE — 99204 OFFICE O/P NEW MOD 45 MIN: CPT

## 2024-01-10 NOTE — HISTORY OF PRESENT ILLNESS
[FreeTextEntry1] : Name MAGNUS GIRON MRN [id number]  Aug 15 1980 ------------------------------------------------------------------------------------------------------------------------------------------- Date of First Visit:  01/10/2024  PCP: Curt Estrada ------------------------------------------------------------------------------------------------------------------------------------------- CC: kidney stones   History of Present Illness: MAGNUS GIRON is a 43 year old female with PMH obesity (s/p lap band procedure in  then revision to sleeve in , on [facility] [employer] [facility] [employer]), anemia who presents for evaluation of kidney stones. She has been experiencing intermittent left flank pain for several months. In 2023 she went to [facility] ED. CT demonstrated a 5mm stone in the left renal pelvis. She recently underwent a renal US for stone surveillance which demonstrated a 1.4cm left renal pelvis stone. Planning to repeat Litholink this weekend. She is still bothered by intermittent left flank pain and is interested in procedures to treat the stone and possibly resolve the flank pain.    Imaging: CT scan from 10/26/2023 can be found in [facility] PACS. Findings: Previously described left renal 0.5 cm in stone, now in renal pelvis, which is mildly dilated. No ureteral stone.  Imaging: Renal US from 2024 can be found in [facility] PACS. Findings: 1.4 cm non obstructing stone is seen in the renal pelvis. There is no hydronephrosis. No renal mass or hydronephrosis  Previous urine cultures: 2023: No growth   Kidney Stone History:  First-time stone former - No Concurrent asymptomatic stone(s) - Yes Previous stone surgeries - No Previous passed stones - Yes - twice  Comorbidities - non-contributory Family history of kidney stones - Sister  Previous metabolic evaluation - Yes  Previous 24-hour urine findings - 2023: Low urine output, mild hypocitraturia

## 2024-01-10 NOTE — ASSESSMENT
[FreeTextEntry1] : Assessment:   MAGNUS GIRON is a 43 year old female with left kidney stone measuring 1.4 cm on recent renal US.   I independently reviewed the patient's relevant imaging studies and discussed the findings and my impressions with them. We discussed repeating imaging to more accurately determine stone size, as the stone measures 1.4cm on recent US but was 5mm on CT scan in October 2023. Also reviewed the management of kidney stones including surveillance, shock wave lithotripsy, ureteroscopy, and Percutaneous nephrolithotomy (PCNL).   Plan: -CT stone hunt  -Follow up TEB after imaging to discuss surgical plan

## 2024-01-10 NOTE — ADDENDUM
[FreeTextEntry1] : I, Dr. Tor Frey, personally performed the evaluation and management (E/M) services for this new patient. This includes conducting the clinically appropriate initial history &/or physical exam, assessing all conditions, and establishing the care plan. My NP, Sylvia Guy, was here to observe my E/M services for this patient.I, Dr. Tor Frey, personally performed the evaluation and management (E/M) services for this new patient. This includes conducting the clinically appropriate initial history &/or physical exam, assessing all conditions, and establishing the care plan. My NP, Sylvia Guy, was here to observe my E/M services for this patient.

## 2024-01-11 ENCOUNTER — TRANSCRIPTION ENCOUNTER (OUTPATIENT)
Age: 44
End: 2024-01-11

## 2024-01-16 ENCOUNTER — TRANSCRIPTION ENCOUNTER (OUTPATIENT)
Age: 44
End: 2024-01-16

## 2024-01-16 ENCOUNTER — RX RENEWAL (OUTPATIENT)
Age: 44
End: 2024-01-16

## 2024-01-18 ENCOUNTER — APPOINTMENT (OUTPATIENT)
Dept: CT IMAGING | Facility: CLINIC | Age: 44
End: 2024-01-18

## 2024-01-23 ENCOUNTER — APPOINTMENT (OUTPATIENT)
Dept: UROLOGY | Facility: CLINIC | Age: 44
End: 2024-01-23
Payer: COMMERCIAL

## 2024-01-23 PROCEDURE — 99214 OFFICE O/P EST MOD 30 MIN: CPT | Mod: 95

## 2024-01-23 NOTE — ASSESSMENT
[FreeTextEntry1] : Assessment:  MAGNUS GIRON is a 43 year old F with a 7 mm left renal pelvis stone.   I discussed the management of urolithiasis with the patient:   Shock Wave Lithotripsy (SWL):  This is the least invasive form of surgery for stones and an excellent option for select stones. I explained how the procedure is performed and that procedural success is dependent on several stone and environmental factors such as the stone composition or density on CT, stone size, stone location, and body habitus, among others. For these reasons, not all stones/patients are good candidates for SWL. The chances of being stone free after SWL are often much lower compared to other modalities, such as ureteroscopy, except in select cases where stone-free rates are comparable. Therefore, there is a risk that the patient would need subsequent procedures to render them stone-free. Since this is a non-invasive procedure, we are relying on the kidney to spontaneously pass the resultant stone fragments. At the same time, this procedure does carry some perioperative risks, mainly bleeding and infection, as well as the small risk of developing obstruction due to passage of stone fragments, which could require urgent placement of a double-J ureteral stent or nephrostomy tube.   Ureteroscopy:  I explained the technique in detail and how it is performed. Complete stone free rates (no residual fragments of any size) approach 90% for ureteral stones and likely range from 50-60% for renal stones. Very commonly, a ureteral stent is left in place at the conclusion of the procedure, but only if needed. I explained that if a stent is placed, it would need to be removed either cystoscopically under local anesthesia or it may have a string left externally through the urethra for removal in a few days after the procedure. Risks of ureteroscopy include, but are not limited to, bleeding, infection, injury to the bladder or ureter, ureteral perforation, ureteral stricture, residual fragments leading to subsequent symptoms or secondary procedures, and other risks involved with general anesthesia. There is also the risk that the procedure needs to be staged into more than one session based on the patient's internal anatomy and the size of the stone(s). Finally, dilation of the ureter and/or ureteral stent placement prior to definitive ureteroscopy may be necessary to achieve ureteral access safely in up to 5% of patients, particularly those who have not been previously instrumented.    Plan:  -Schedule for LEFT ureteroscopy with laser lithotripsy 2/1 -Pre-operative labs, UA/UCx by PCP -Medical clearance requested

## 2024-01-23 NOTE — HISTORY OF PRESENT ILLNESS
[FreeTextEntry1] : Name MAGNUS GIRON MRN [id number]  Aug 15 1980 ------------------------------------------------------------------------------------------------------------------------------------------- Date of First Visit: 01/10/2024 PCP: Curt Estrada ------------------------------------------------------------------------------------------------------------------------------------------- CC: kidney stones  History of Present Illness: MAGNUS GIRON is a 43 year old female with PMH obesity (s/p lap band procedure in  then revision to sleeve in , on [facility]), anemia who presents for evaluation of kidney stones. She has been experiencing intermittent left flank pain for several months. In 2023 she went to [facility] ED. CT demonstrated a 5mm stone in the left renal pelvis. She recently underwent a renal US for stone surveillance which demonstrated a 1.4cm left renal pelvis stone. Planning to repeat Litholink this weekend. She is still bothered by intermittent left flank pain and is interested in procedures to treat the stone and possibly resolve the flank pain.  Imaging: CT scan from 10/26/2023 can be found in [facility] PACS. Findings: Previously described left renal 0.5 cm in stone, now in renal pelvis, which is mildly dilated. No ureteral stone.  Imaging: Renal US from 2024 can be found in [facility] PACS. Findings: 1.4 cm non obstructing stone is seen in the renal pelvis. There is no hydronephrosis. No renal mass or hydronephrosis  Previous urine cultures: 2023: No growth  Kidney Stone History: First-time stone former - No Concurrent asymptomatic stone(s) - Yes Previous stone surgeries - No Previous passed stones - Yes - twice Comorbidities - non-contributory Family history of kidney stones - Sister Previous metabolic evaluation - Yes Previous 24-hour urine findings - 2023: Low urine output, mild hypocitraturia ------------------------------------------------------------------------------------------------------------------------------------------- Interval History (2024): CT shows a 7 mm left renal pelvic stone with mild RP distention without caliectasis. She continues to have intermittent pain, likely related to stone position in the renal pelvis. She is interested in treatment. PMH only notable for asthma.

## 2024-01-24 ENCOUNTER — TRANSCRIPTION ENCOUNTER (OUTPATIENT)
Age: 44
End: 2024-01-24

## 2024-01-26 ENCOUNTER — TRANSCRIPTION ENCOUNTER (OUTPATIENT)
Age: 44
End: 2024-01-26

## 2024-01-29 ENCOUNTER — NON-APPOINTMENT (OUTPATIENT)
Age: 44
End: 2024-01-29

## 2024-01-30 ENCOUNTER — TRANSCRIPTION ENCOUNTER (OUTPATIENT)
Age: 44
End: 2024-01-30

## 2024-01-31 ENCOUNTER — TRANSCRIPTION ENCOUNTER (OUTPATIENT)
Age: 44
End: 2024-01-31

## 2024-01-31 ENCOUNTER — NON-APPOINTMENT (OUTPATIENT)
Age: 44
End: 2024-01-31

## 2024-01-31 RX ORDER — PHENAZOPYRIDINE HYDROCHLORIDE 100 MG/1
100 TABLET ORAL 3 TIMES DAILY
Qty: 9 | Refills: 0 | Status: ACTIVE | COMMUNITY
Start: 2024-01-31 | End: 1900-01-01

## 2024-01-31 RX ORDER — TAMSULOSIN HYDROCHLORIDE 0.4 MG/1
0.4 CAPSULE ORAL
Qty: 14 | Refills: 0 | Status: ACTIVE | COMMUNITY
Start: 2024-01-31 | End: 1900-01-01

## 2024-01-31 RX ORDER — OXYBUTYNIN CHLORIDE 10 MG/1
10 TABLET, EXTENDED RELEASE ORAL
Qty: 14 | Refills: 0 | Status: ACTIVE | COMMUNITY
Start: 2024-01-31 | End: 1900-01-01

## 2024-01-31 NOTE — ASU PATIENT PROFILE, ADULT - NS PREOP UNDERSTANDS INFO
no solids after 12mn, clears allowed up to 8am, bring ID and insurance card, no valuables or jewelry, wear comfortable clothing/yes

## 2024-01-31 NOTE — ASU DISCHARGE PLAN (ADULT/PEDIATRIC) - ASU DC SPECIAL INSTRUCTIONSFT
URETEROSCOPY    GENERAL: It is common to have blood in your urine after your procedure. It may be pink or even red; and it is important to increase fluid intake to 2-3L of water per day to keep the urine as clear as possible. Please inform your doctor if you have a significant amount of clot in the urine or if you are unable to void at all. The urine may clear and then become bloody again especially as you are more physically active.    STENT: You may have an internal stent (a hollow tube that runs from the kidney to your bladder) after your procedure, which helps urine drain from the kidney to your bladder. Some patients experience urinary frequency, burning, or even back pain (especially with urination). These sensations will gradually get better. Increasing your fluid intake can also improve these symptoms. While the stent is in place, your urine may continue to be bloody. This stent is temporary and must be removed by your urologist as an outpatient with in 3 months unless otherwise specified. If your stent is on a string, it is secured to your leg or genitalia with an adhesive bandage. Do not pull on the string, do not remove the bandage, do not insert anything intravaginally/intraurethrally, and do not engage in sexual intercourse until after the stent is removed at your post-operative appointment.    CATHETER: Some patients are sent home with a Yee catheter, while others go home urinating on their own. A Yee catheter continuously drains the urine from the bladder. If you still have a catheter, the nurses will review instructions and care before you go home.     UROLOGIC MEDICATIONS:  The following medications may have been sent to your pharmacy for stent related discomfort: Flomax (tamsulosin) 0.4mg at bedtime until stent removed, Ditropan (oxybutynin) 5mg every 8 hours as needed for bladder spasms, and Pyridium (phenazopyridine) 100mg every 8 hours as needed for kidney/bladder discomfort for max 3 days (Pyridium will make your urine orange).     PAIN: You may take Tylenol (acetaminophen) 650-975mg and/or Motrin (ibuprofen) 400-600mg, both available over the counter, for pain every 6 hours as needed. Do not exceed 4000mg of Tylenol (acetaminophen) daily. You may alternate these medications such that you take one or the other every 3 hours for around the clock pain coverage. If you have a stent, the following medications may have been sent to your pharmacy for stent related discomfort: Flomax (tamsulosin) 0.4mg at bedtime until stent removed, Ditropan (oxybutynin) 5mg every 8 hours as needed for bladder spasms, and Pyridium (phenazopyridine) 100mg every 8 hours as needed for kidney/bladder discomfort for max 3 days (Pyridium will make your urine orange).     ANTIBIOTICS: You may be given a prescription for an antibiotic, please take this medication as instructed and be sure to complete the entire course.     STOOL SOFTENERS: Do not allow yourself to become constipated as straining may cause bleeding. Take stool softeners or a laxative (ex. Miralax, Colace, Senokot, ExLax, etc), available over the counter, if needed.    ANTICOAGULATION: If you are taking any blood thinning medications, please discuss with your urologist prior to restarting these medications unless otherwise specified.    BATHING: You may shower or bathe.    DIET: You may resume your regular diet and regular medication regimen.    ACTIVITY: No heavy lifting or strenuous exercise until you are evaluated at your post-operative appointment. Otherwise, you may return to your usual level of physical activity.    FOLLOW-UP: If you did not already schedule your post-operative appointment, please call your urologist to schedule and follow-up appointment.    CALL YOUR UROLOGIST IF: You have any bleeding that does not stop, inability to void >8 hours, fever over 100.4 F, chills, persistent nausea/vomiting, changes in your incision concerning for infection, or if your pain is not controlled on your discharge pain medications. URETEROSCOPY    GENERAL: It is common to have blood in your urine after your procedure. It may be pink or even red; and it is important to increase fluid intake to 2-3L of water per day to keep the urine as clear as possible. Please inform your doctor if you have a significant amount of clot in the urine or if you are unable to void at all. The urine may clear and then become bloody again especially as you are more physically active.    STENT: You may have an internal stent (a hollow tube that runs from the kidney to your bladder) after your procedure, which helps urine drain from the kidney to your bladder. Some patients experience urinary frequency, burning, or even back pain (especially with urination). These sensations will gradually get better. Increasing your fluid intake can also improve these symptoms. While the stent is in place, your urine may continue to be bloody. This stent is temporary and must be removed by your urologist as an outpatient with in 3 months unless otherwise specified. If your stent is on a string, it is secured to your leg or genitalia with an adhesive bandage. Do not pull on the string, do not remove the bandage, do not insert anything intravaginally/intraurethrally, and do not engage in sexual intercourse until after the stent is removed at your post-operative appointment.    PAIN: You may take Tylenol (acetaminophen) 650-975mg available over the counter, for pain every 6 hours as needed. Do not exceed 4000mg of Tylenol (acetaminophen) daily.     STOOL SOFTENERS: Do not allow yourself to become constipated as straining may cause bleeding. Take stool softeners or a laxative (ex. Miralax, Colace, Senokot, ExLax, etc), available over the counter, if needed.    ANTICOAGULATION: If you are taking any blood thinning medications, please discuss with your urologist prior to restarting these medications unless otherwise specified.    BATHING: You may shower, do not dislodge the string or get the dressing wet    DIET: You may resume your regular diet and regular medication regimen.    ACTIVITY: No heavy lifting or strenuous exercise until you are evaluated at your post-operative appointment. Otherwise, you may return to your usual level of physical activity.    FOLLOW-UP: If you did not already schedule your post-operative appointment, please call your urologist to schedule and follow-up appointment.    CALL YOUR UROLOGIST IF: You have any bleeding that does not stop, inability to void >8 hours, fever over 100.4 F, chills, persistent nausea/vomiting, changes in your incision concerning for infection, or if your pain is not controlled on your discharge pain medications.

## 2024-01-31 NOTE — ASU DISCHARGE PLAN (ADULT/PEDIATRIC) - NS MD DC FALL RISK RISK
For information on Fall & Injury Prevention, visit: [email or web address] OR  [email or web address] OR  https://www.cdc.gov/steadi/patient.html

## 2024-01-31 NOTE — ASU PATIENT PROFILE, ADULT - NSICDXPASTMEDICALHX_GEN_ALL_CORE_FT
PAST MEDICAL HISTORY:  Asthma     Fibroids     Migraine      PAST MEDICAL HISTORY:  Acid reflux     Asthma     Fibroids     History of thyroid nodule Monitoring    Kidney stones     Migraine

## 2024-01-31 NOTE — ASU DISCHARGE PLAN (ADULT/PEDIATRIC) - CARE PROVIDER_API CALL
Tor Frey.  Urology  [address]  [place] [address]  Phone: [phone]  Fax: [phone]  Follow Up Time: 1-3 days

## 2024-01-31 NOTE — ASU PATIENT PROFILE, ADULT - NSICDXPASTSURGICALHX_GEN_ALL_CORE_FT
PAST SURGICAL HISTORY:  H/O laparoscopic adjustable gastric banding     History of gastric surgery revision of lap band 3013    Status post embolization of uterine artery      PAST SURGICAL HISTORY:  H/O laparoscopic adjustable gastric banding     History of  section     History of gastric surgery revision of lap band 3013    Status post embolization of uterine artery

## 2024-02-01 ENCOUNTER — OUTPATIENT (OUTPATIENT)
Dept: OUTPATIENT SERVICES | Facility: HOSPITAL | Age: 44
LOS: 1 days | Discharge: HOME | End: 2024-02-01
Payer: COMMERCIAL

## 2024-02-01 ENCOUNTER — TRANSCRIPTION ENCOUNTER (OUTPATIENT)
Age: 44
End: 2024-02-01

## 2024-02-01 ENCOUNTER — NON-APPOINTMENT (OUTPATIENT)
Age: 44
End: 2024-02-01

## 2024-02-01 ENCOUNTER — APPOINTMENT (OUTPATIENT)
Dept: UROLOGY | Facility: AMBULATORY SURGERY CENTER | Age: 44
End: 2024-02-01

## 2024-02-01 VITALS
SYSTOLIC BLOOD PRESSURE: 114 MMHG | DIASTOLIC BLOOD PRESSURE: 83 MMHG | HEART RATE: 77 BPM | OXYGEN SATURATION: 96 % | RESPIRATION RATE: 12 BRPM

## 2024-02-01 VITALS
TEMPERATURE: 98 F | HEIGHT: 65 IN | RESPIRATION RATE: 16 BRPM | WEIGHT: 195.55 LBS | OXYGEN SATURATION: 99 % | DIASTOLIC BLOOD PRESSURE: 76 MMHG | SYSTOLIC BLOOD PRESSURE: 113 MMHG | HEART RATE: 76 BPM

## 2024-02-01 DIAGNOSIS — Z98.84 BARIATRIC SURGERY STATUS: Chronic | ICD-10-CM

## 2024-02-01 DIAGNOSIS — Z98.89 OTHER SPECIFIED POSTPROCEDURAL STATES: Chronic | ICD-10-CM

## 2024-02-01 DIAGNOSIS — Z98.890 OTHER SPECIFIED POSTPROCEDURAL STATES: Chronic | ICD-10-CM

## 2024-02-01 DIAGNOSIS — Z98.891 HISTORY OF UTERINE SCAR FROM PREVIOUS SURGERY: Chronic | ICD-10-CM

## 2024-02-01 PROCEDURE — 74420 UROGRAPHY RTRGR +-KUB: CPT | Mod: 26

## 2024-02-01 PROCEDURE — 52356 CYSTO/URETERO W/LITHOTRIPSY: CPT | Mod: LT

## 2024-02-01 DEVICE — URETERAL STENT TRIA SOFT 6FR 24MM: Type: IMPLANTABLE DEVICE | Site: LEFT | Status: FUNCTIONAL

## 2024-02-01 DEVICE — DILATOR SHEATH SET 8/10: Type: IMPLANTABLE DEVICE | Site: LEFT | Status: FUNCTIONAL

## 2024-02-01 DEVICE — URETERAL CATH OPEN END 5FR 70CM: Type: IMPLANTABLE DEVICE | Site: LEFT | Status: FUNCTIONAL

## 2024-02-01 DEVICE — LASER FIBER SOLTIVE 200: Type: IMPLANTABLE DEVICE | Site: LEFT | Status: FUNCTIONAL

## 2024-02-01 DEVICE — GUIDEWIRE SENSOR DUAL-FLEX NITINOL STRAIGHT .035" X 150CM: Type: IMPLANTABLE DEVICE | Site: LEFT | Status: FUNCTIONAL

## 2024-02-01 RX ORDER — OXYBUTYNIN CHLORIDE 5 MG
5 TABLET ORAL EVERY 8 HOURS
Refills: 0 | Status: COMPLETED | OUTPATIENT
Start: 2024-02-01 | End: 2024-12-30

## 2024-02-01 RX ORDER — DIAZEPAM 5 MG
2.5 TABLET ORAL ONCE
Refills: 0 | Status: DISCONTINUED | OUTPATIENT
Start: 2024-02-01 | End: 2024-02-01

## 2024-02-01 RX ORDER — ACETAMINOPHEN 500 MG
650 TABLET ORAL EVERY 4 HOURS
Refills: 0 | Status: DISCONTINUED | OUTPATIENT
Start: 2024-02-01 | End: 2024-02-01

## 2024-02-01 RX ORDER — APREPITANT 80 MG/1
40 CAPSULE ORAL ONCE
Refills: 0 | Status: COMPLETED | OUTPATIENT
Start: 2024-02-01 | End: 2024-02-01

## 2024-02-01 RX ORDER — SODIUM CHLORIDE 9 MG/ML
1000 INJECTION, SOLUTION INTRAVENOUS
Refills: 0 | Status: DISCONTINUED | OUTPATIENT
Start: 2024-02-01 | End: 2024-02-01

## 2024-02-01 RX ORDER — OXYBUTYNIN CHLORIDE 5 MG
5 TABLET ORAL EVERY 8 HOURS
Refills: 0 | Status: DISCONTINUED | OUTPATIENT
Start: 2024-02-01 | End: 2024-02-01

## 2024-02-01 RX ORDER — PHENAZOPYRIDINE HCL 100 MG
100 TABLET ORAL ONCE
Refills: 0 | Status: DISCONTINUED | OUTPATIENT
Start: 2024-02-01 | End: 2024-02-01

## 2024-02-01 RX ORDER — OXYCODONE 5 MG/1
5 TABLET ORAL EVERY 6 HOURS
Qty: 12 | Refills: 0 | Status: ACTIVE | COMMUNITY
Start: 2024-02-01 | End: 1900-01-01

## 2024-02-01 RX ORDER — ACETAMINOPHEN 500 MG
1000 TABLET ORAL ONCE
Refills: 0 | Status: COMPLETED | OUTPATIENT
Start: 2024-02-01 | End: 2024-02-01

## 2024-02-01 RX ORDER — ONDANSETRON 8 MG/1
4 TABLET, FILM COATED ORAL ONCE
Refills: 0 | Status: COMPLETED | OUTPATIENT
Start: 2024-02-01 | End: 2024-02-01

## 2024-02-01 RX ORDER — FENTANYL CITRATE 50 UG/ML
25 INJECTION INTRAVENOUS
Refills: 0 | Status: DISCONTINUED | OUTPATIENT
Start: 2024-02-01 | End: 2024-02-01

## 2024-02-01 RX ORDER — HYDROMORPHONE HYDROCHLORIDE 2 MG/ML
0.5 INJECTION INTRAMUSCULAR; INTRAVENOUS; SUBCUTANEOUS
Refills: 0 | Status: DISCONTINUED | OUTPATIENT
Start: 2024-02-01 | End: 2024-02-01

## 2024-02-01 RX ORDER — SODIUM CHLORIDE 9 MG/ML
500 INJECTION, SOLUTION INTRAVENOUS
Refills: 0 | Status: DISCONTINUED | OUTPATIENT
Start: 2024-02-01 | End: 2024-02-01

## 2024-02-01 RX ORDER — RIMEGEPANT SULFATE 75 MG/75MG
1 TABLET, ORALLY DISINTEGRATING ORAL
Refills: 0 | DISCHARGE

## 2024-02-01 RX ORDER — TAMSULOSIN HYDROCHLORIDE 0.4 MG/1
0.4 CAPSULE ORAL ONCE
Refills: 0 | Status: DISCONTINUED | OUTPATIENT
Start: 2024-02-01 | End: 2024-02-01

## 2024-02-01 RX ADMIN — APREPITANT 40 MILLIGRAM(S): 80 CAPSULE ORAL at 10:08

## 2024-02-01 RX ADMIN — Medication 400 MILLIGRAM(S): at 16:01

## 2024-02-01 RX ADMIN — FENTANYL CITRATE 25 MICROGRAM(S): 50 INJECTION INTRAVENOUS at 14:25

## 2024-02-01 RX ADMIN — HYDROMORPHONE HYDROCHLORIDE 0.5 MILLIGRAM(S): 2 INJECTION INTRAMUSCULAR; INTRAVENOUS; SUBCUTANEOUS at 16:03

## 2024-02-01 RX ADMIN — FENTANYL CITRATE 25 MICROGRAM(S): 50 INJECTION INTRAVENOUS at 15:02

## 2024-02-01 RX ADMIN — FENTANYL CITRATE 25 MICROGRAM(S): 50 INJECTION INTRAVENOUS at 14:50

## 2024-02-01 RX ADMIN — Medication 2.5 MILLIGRAM(S): at 15:23

## 2024-02-01 RX ADMIN — FENTANYL CITRATE 25 MICROGRAM(S): 50 INJECTION INTRAVENOUS at 15:17

## 2024-02-01 RX ADMIN — FENTANYL CITRATE 25 MICROGRAM(S): 50 INJECTION INTRAVENOUS at 14:10

## 2024-02-01 RX ADMIN — Medication 2.5 MILLIGRAM(S): at 14:25

## 2024-02-01 RX ADMIN — FENTANYL CITRATE 25 MICROGRAM(S): 50 INJECTION INTRAVENOUS at 14:00

## 2024-02-01 RX ADMIN — ONDANSETRON 4 MILLIGRAM(S): 8 TABLET, FILM COATED ORAL at 15:30

## 2024-02-01 RX ADMIN — HYDROMORPHONE HYDROCHLORIDE 0.5 MILLIGRAM(S): 2 INJECTION INTRAMUSCULAR; INTRAVENOUS; SUBCUTANEOUS at 16:16

## 2024-02-01 RX ADMIN — Medication 1000 MILLIGRAM(S): at 16:16

## 2024-02-01 RX ADMIN — Medication 1000 MILLIGRAM(S): at 10:09

## 2024-02-01 NOTE — BRIEF OPERATIVE NOTE - NSICDXBRIEFPROCEDURE_GEN_ALL_CORE_FT
HgA1c 5.3
PROCEDURES:  Cystoscopy, with ureteroscopic laser lithotripsy and stent insertion 01-Feb-2024 14:16:48  Curt Gomes

## 2024-02-01 NOTE — BRIEF OPERATIVE NOTE - OPERATION/FINDINGS
5 mm left renal stone noted, laser lithotripsied with thulium 200 micron fiber. Stent placed at end of case with string on

## 2024-02-01 NOTE — PRE-ANESTHESIA EVALUATION ADULT - NSANTHPMHFT_GEN_ALL_CORE
pt with GERD, but states it is only with triggers of certain foods or doxycycline.  Otherwise she states she is asymptomatic and does not require antacids or medication.  Pt states she is currently asymptomatic.

## 2024-02-02 ENCOUNTER — NON-APPOINTMENT (OUTPATIENT)
Age: 44
End: 2024-02-02

## 2024-02-02 PROBLEM — N20.0 CALCULUS OF KIDNEY: Chronic | Status: ACTIVE | Noted: 2024-02-01

## 2024-02-02 PROBLEM — Z86.39 PERSONAL HISTORY OF OTHER ENDOCRINE, NUTRITIONAL AND METABOLIC DISEASE: Chronic | Status: ACTIVE | Noted: 2024-02-01

## 2024-02-02 PROBLEM — K21.9 GASTRO-ESOPHAGEAL REFLUX DISEASE WITHOUT ESOPHAGITIS: Chronic | Status: ACTIVE | Noted: 2024-02-01

## 2024-02-04 ENCOUNTER — EMERGENCY (EMERGENCY)
Facility: HOSPITAL | Age: 44
LOS: 1 days | Discharge: HOME | End: 2024-02-04
Attending: EMERGENCY MEDICINE | Admitting: EMERGENCY MEDICINE
Payer: COMMERCIAL

## 2024-02-04 ENCOUNTER — NON-APPOINTMENT (OUTPATIENT)
Age: 44
End: 2024-02-04

## 2024-02-04 VITALS
SYSTOLIC BLOOD PRESSURE: 127 MMHG | OXYGEN SATURATION: 99 % | DIASTOLIC BLOOD PRESSURE: 85 MMHG | RESPIRATION RATE: 18 BRPM | TEMPERATURE: 99 F | HEART RATE: 64 BPM

## 2024-02-04 VITALS
RESPIRATION RATE: 17 BRPM | HEART RATE: 72 BPM | TEMPERATURE: 98 F | WEIGHT: 197.98 LBS | SYSTOLIC BLOOD PRESSURE: 133 MMHG | DIASTOLIC BLOOD PRESSURE: 89 MMHG | HEIGHT: 65 IN | OXYGEN SATURATION: 100 %

## 2024-02-04 DIAGNOSIS — Z98.890 OTHER SPECIFIED POSTPROCEDURAL STATES: Chronic | ICD-10-CM

## 2024-02-04 DIAGNOSIS — Z98.84 BARIATRIC SURGERY STATUS: ICD-10-CM

## 2024-02-04 DIAGNOSIS — N23 UNSPECIFIED RENAL COLIC: ICD-10-CM

## 2024-02-04 DIAGNOSIS — Z98.89 OTHER SPECIFIED POSTPROCEDURAL STATES: Chronic | ICD-10-CM

## 2024-02-04 DIAGNOSIS — Z87.442 PERSONAL HISTORY OF URINARY CALCULI: ICD-10-CM

## 2024-02-04 DIAGNOSIS — Z98.84 BARIATRIC SURGERY STATUS: Chronic | ICD-10-CM

## 2024-02-04 DIAGNOSIS — Z98.891 HISTORY OF UTERINE SCAR FROM PREVIOUS SURGERY: Chronic | ICD-10-CM

## 2024-02-04 DIAGNOSIS — Z88.1 ALLERGY STATUS TO OTHER ANTIBIOTIC AGENTS STATUS: ICD-10-CM

## 2024-02-04 DIAGNOSIS — N13.2 HYDRONEPHROSIS WITH RENAL AND URETERAL CALCULOUS OBSTRUCTION: ICD-10-CM

## 2024-02-04 LAB
ANION GAP SERPL CALC-SCNC: 9 MMOL/L — SIGNIFICANT CHANGE UP (ref 5–17)
APPEARANCE UR: ABNORMAL
BACTERIA # UR AUTO: NEGATIVE /HPF — SIGNIFICANT CHANGE UP
BASOPHILS # BLD AUTO: 0.02 K/UL — SIGNIFICANT CHANGE UP (ref 0–0.2)
BASOPHILS NFR BLD AUTO: 0.4 % — SIGNIFICANT CHANGE UP (ref 0–2)
BILIRUB UR-MCNC: NEGATIVE — SIGNIFICANT CHANGE UP
BUN SERPL-MCNC: 9 MG/DL — SIGNIFICANT CHANGE UP (ref 7–23)
CALCIUM SERPL-MCNC: 8.7 MG/DL — SIGNIFICANT CHANGE UP (ref 8.4–10.5)
CAST: 1 /LPF — SIGNIFICANT CHANGE UP (ref 0–4)
CHLORIDE SERPL-SCNC: 109 MMOL/L — HIGH (ref 96–108)
CO2 SERPL-SCNC: 26 MMOL/L — SIGNIFICANT CHANGE UP (ref 22–31)
COLOR SPEC: ABNORMAL
CREAT SERPL-MCNC: 0.86 MG/DL — SIGNIFICANT CHANGE UP (ref 0.5–1.3)
DIFF PNL FLD: ABNORMAL
EGFR: 85 ML/MIN/1.73M2 — SIGNIFICANT CHANGE UP
EOSINOPHIL # BLD AUTO: 0.07 K/UL — SIGNIFICANT CHANGE UP (ref 0–0.5)
EOSINOPHIL NFR BLD AUTO: 1.5 % — SIGNIFICANT CHANGE UP (ref 0–6)
GLUCOSE SERPL-MCNC: 127 MG/DL — HIGH (ref 70–99)
GLUCOSE UR QL: NEGATIVE MG/DL — SIGNIFICANT CHANGE UP
HCG UR QL: NEGATIVE — SIGNIFICANT CHANGE UP
HCT VFR BLD CALC: 33.7 % — LOW (ref 34.5–45)
HGB BLD-MCNC: 10.8 G/DL — LOW (ref 11.5–15.5)
IMM GRANULOCYTES NFR BLD AUTO: 0.2 % — SIGNIFICANT CHANGE UP (ref 0–0.9)
KETONES UR-MCNC: NEGATIVE MG/DL — SIGNIFICANT CHANGE UP
LEUKOCYTE ESTERASE UR-ACNC: ABNORMAL
LYMPHOCYTES # BLD AUTO: 1.31 K/UL — SIGNIFICANT CHANGE UP (ref 1–3.3)
LYMPHOCYTES # BLD AUTO: 27.3 % — SIGNIFICANT CHANGE UP (ref 13–44)
MCHC RBC-ENTMCNC: 29 PG — SIGNIFICANT CHANGE UP (ref 27–34)
MCHC RBC-ENTMCNC: 32 GM/DL — SIGNIFICANT CHANGE UP (ref 32–36)
MCV RBC AUTO: 90.6 FL — SIGNIFICANT CHANGE UP (ref 80–100)
MONOCYTES # BLD AUTO: 0.31 K/UL — SIGNIFICANT CHANGE UP (ref 0–0.9)
MONOCYTES NFR BLD AUTO: 6.5 % — SIGNIFICANT CHANGE UP (ref 2–14)
NEUTROPHILS # BLD AUTO: 3.08 K/UL — SIGNIFICANT CHANGE UP (ref 1.8–7.4)
NEUTROPHILS NFR BLD AUTO: 64.1 % — SIGNIFICANT CHANGE UP (ref 43–77)
NITRITE UR-MCNC: NEGATIVE — SIGNIFICANT CHANGE UP
NRBC # BLD: 0 /100 WBCS — SIGNIFICANT CHANGE UP (ref 0–0)
PH UR: 5.5 — SIGNIFICANT CHANGE UP (ref 5–8)
PLATELET # BLD AUTO: 187 K/UL — SIGNIFICANT CHANGE UP (ref 150–400)
POTASSIUM SERPL-MCNC: 3.8 MMOL/L — SIGNIFICANT CHANGE UP (ref 3.5–5.3)
POTASSIUM SERPL-SCNC: 3.8 MMOL/L — SIGNIFICANT CHANGE UP (ref 3.5–5.3)
PROT UR-MCNC: 100 MG/DL
RBC # BLD: 3.72 M/UL — LOW (ref 3.8–5.2)
RBC # FLD: 13.4 % — SIGNIFICANT CHANGE UP (ref 10.3–14.5)
RBC CASTS # UR COMP ASSIST: 1092 /HPF — HIGH (ref 0–4)
SODIUM SERPL-SCNC: 144 MMOL/L — SIGNIFICANT CHANGE UP (ref 135–145)
SP GR SPEC: 1.02 — SIGNIFICANT CHANGE UP (ref 1–1.03)
SQUAMOUS # UR AUTO: 6 /HPF — HIGH (ref 0–5)
UROBILINOGEN FLD QL: 1 MG/DL — SIGNIFICANT CHANGE UP (ref 0.2–1)
WBC # BLD: 4.8 K/UL — SIGNIFICANT CHANGE UP (ref 3.8–10.5)
WBC # FLD AUTO: 4.8 K/UL — SIGNIFICANT CHANGE UP (ref 3.8–10.5)
WBC UR QL: 99 /HPF — HIGH (ref 0–5)

## 2024-02-04 PROCEDURE — 99284 EMERGENCY DEPT VISIT MOD MDM: CPT | Mod: 25

## 2024-02-04 PROCEDURE — 80048 BASIC METABOLIC PNL TOTAL CA: CPT

## 2024-02-04 PROCEDURE — 87086 URINE CULTURE/COLONY COUNT: CPT

## 2024-02-04 PROCEDURE — 81001 URINALYSIS AUTO W/SCOPE: CPT

## 2024-02-04 PROCEDURE — 76770 US EXAM ABDO BACK WALL COMP: CPT | Mod: 26

## 2024-02-04 PROCEDURE — 96361 HYDRATE IV INFUSION ADD-ON: CPT

## 2024-02-04 PROCEDURE — 96374 THER/PROPH/DIAG INJ IV PUSH: CPT

## 2024-02-04 PROCEDURE — 36415 COLL VENOUS BLD VENIPUNCTURE: CPT

## 2024-02-04 PROCEDURE — 85025 COMPLETE CBC W/AUTO DIFF WBC: CPT

## 2024-02-04 PROCEDURE — 99285 EMERGENCY DEPT VISIT HI MDM: CPT

## 2024-02-04 PROCEDURE — 81025 URINE PREGNANCY TEST: CPT

## 2024-02-04 PROCEDURE — 76770 US EXAM ABDO BACK WALL COMP: CPT

## 2024-02-04 RX ORDER — ACETAMINOPHEN 500 MG
1000 TABLET ORAL ONCE
Refills: 0 | Status: COMPLETED | OUTPATIENT
Start: 2024-02-04 | End: 2024-02-04

## 2024-02-04 RX ORDER — OXYCODONE AND ACETAMINOPHEN 5; 325 MG/1; MG/1
1 TABLET ORAL
Qty: 20 | Refills: 0
Start: 2024-02-04 | End: 2024-02-08

## 2024-02-04 RX ORDER — TIRZEPATIDE 15 MG/.5ML
12.5 INJECTION, SOLUTION SUBCUTANEOUS
Refills: 0 | DISCHARGE

## 2024-02-04 RX ORDER — HYDROMORPHONE HYDROCHLORIDE 2 MG/ML
1 INJECTION INTRAMUSCULAR; INTRAVENOUS; SUBCUTANEOUS ONCE
Refills: 0 | Status: DISCONTINUED | OUTPATIENT
Start: 2024-02-04 | End: 2024-02-04

## 2024-02-04 RX ORDER — SODIUM CHLORIDE 9 MG/ML
1000 INJECTION INTRAMUSCULAR; INTRAVENOUS; SUBCUTANEOUS ONCE
Refills: 0 | Status: COMPLETED | OUTPATIENT
Start: 2024-02-04 | End: 2024-02-04

## 2024-02-04 RX ADMIN — SODIUM CHLORIDE 1000 MILLILITER(S): 9 INJECTION INTRAMUSCULAR; INTRAVENOUS; SUBCUTANEOUS at 14:49

## 2024-02-04 RX ADMIN — HYDROMORPHONE HYDROCHLORIDE 1 MILLIGRAM(S): 2 INJECTION INTRAMUSCULAR; INTRAVENOUS; SUBCUTANEOUS at 14:20

## 2024-02-04 RX ADMIN — HYDROMORPHONE HYDROCHLORIDE 1 MILLIGRAM(S): 2 INJECTION INTRAMUSCULAR; INTRAVENOUS; SUBCUTANEOUS at 13:49

## 2024-02-04 RX ADMIN — Medication 1000 MILLIGRAM(S): at 17:45

## 2024-02-04 RX ADMIN — SODIUM CHLORIDE 1000 MILLILITER(S): 9 INJECTION INTRAMUSCULAR; INTRAVENOUS; SUBCUTANEOUS at 13:49

## 2024-02-04 RX ADMIN — Medication 1000 MILLIGRAM(S): at 17:15

## 2024-02-04 NOTE — ED PROVIDER NOTE - OBJECTIVE STATEMENT
43 with  history of gastric bypass, history of recent renal stones with lithotripsy and  urethral stent placement, on February 1,  patient reports stent accidentally caught on panty liner and pulled out, patient was told this was okay, initially had some bleeding which stopped, now here today with worsening left flank pain, ran out of oxycodone which she took due to flank pain, no fevers, no vomiting, lack of appetite, decreased p.o. intake.

## 2024-02-04 NOTE — ED ADULT NURSE NOTE - NSFALLUNIVINTERV_ED_ALL_ED
Bed/Stretcher in lowest position, wheels locked, appropriate side rails in place/Call bell, personal items and telephone in reach/Instruct patient to call for assistance before getting out of bed/chair/stretcher/Non-slip footwear applied when patient is off stretcher/[place] to call system/Physically safe environment - no spills, clutter or unnecessary equipment/Purposeful proactive rounding/Room/bathroom lighting operational, light cord in reach

## 2024-02-04 NOTE — ED ADULT NURSE NOTE - NSICDXPASTMEDICALHX_GEN_ALL_CORE_FT
PAST MEDICAL HISTORY:  Acid reflux     Asthma     Fibroids     History of thyroid nodule Monitoring    Kidney stones     Migraine

## 2024-02-04 NOTE — ED PROVIDER NOTE - ENMT, MLM
Airway patent , Nasal mucosa clear. Mouth with normal mucosa. Throat has no vesicles, no oropharyngeal exudates and uvula is midline.

## 2024-02-04 NOTE — ED ADULT NURSE NOTE - EXTENSIONS OF SELF_ADULT
We appreciate nephrology for management. IV fluids given in the ED. Tran catheter placed. Nephrology has ordered a renal ultrasound. Was initially started on CRRT---on 8/28 CRRT stopped. Monitor intake and output. Creatinine stable, 0.7. Tran catheter removed.    None

## 2024-02-04 NOTE — CONSULT NOTE ADULT - SUBJECTIVE AND OBJECTIVE BOX
PAST MEDICAL & SURGICAL HISTORY:  Asthma  Migraine  Fibroids  Acid reflux  Kidney stones  History of thyroid nodule-Monitoring  History of gastric surgery-revision of lap band 2013  Status post embolization of uterine artery  H/O laparoscopic adjustable gastric banding  History of  section    Home Medications:  Mounjaro 10 mg/0.5 mL subcutaneous solution: 12.5 milligram(s) subcutaneously once a week (2024 10:18)  Nurtec ODT 75 mg oral tablet, disintegratin tab(s) orally prn (2024 10:18)    Vital Signs Last 24 Hrs  T(C): 36.8 (2024 13:08), Max: 36.8 (2024 13:08)  T(F): 98.2 (2024 13:08), Max: 98.2 (2024 13:08)  HR: 72 (2024 13:08) (72 - 72)  BP: 133/89 (2024 13:08) (133/89 - 133/89)  BP(mean): --  RR: 17 (2024 13:08) (17 - 17)  SpO2: 100% (2024 13:08) (100% - 100%)    Parameters below as of 2024 13:08  Patient On (Oxygen Delivery Method): room air      Physical Exam  General: WD, alert  Skin: Warm, dry and intact  Abd: Soft, ND, mild tenderness noted in left upper quadrant with palpation and mild left lower flank pain both controlled with pain medication  Extrem: No Edema                            10.8   4.80  )-----------( 187      ( 2024 13:35 )             33.7   02-    144  |  109<H>  |  9   ----------------------------<  127<H>  3.8   |  26  |  0.86    Ca    8.7      2024 13:35    US Retroperitoneal Complete (24 @ 16:16) >  FINDINGS:  Right kidney: 11.0 cm. No renal mass, hydronephrosis or calculi.    Left kidney: 11.4 cm. Mild hydronephrosis identified. Punctate lower pole   echogenic foci seen. Previously described 1.4 cm pelvic calculus not   appreciated    Urinary bladder: Within normal limits. Bilateral ureteral jets are seen. .    IMPRESSION:  Punctate nonobstructing left lower pole calculi. Mild left hydronephrosis   with a left ureteral jet identified    < end of copied text >   This is a 44 yo female with history of gastric bypass and renal stones.  On 2024 she underwent a lithotripsy with urethral stent placement here at [facility]. On  she called the urology office and reported that the stent accidentally caught on to her panty liner and pulled out.  She was told this was okay, but if the pain continues she should come to the ER. Initially she had some bleeding which stopped.  Now today, she presented with worsening left flank pain, and ran out of oxycodone which she was taking for the flank pain.  She denied any  fevers, vomiting, lack of appetite, decreased p.o. intake, or difficulty urinating.      PAST MEDICAL & SURGICAL HISTORY:  Asthma  Migraine  Fibroids  Acid reflux  Kidney stones  History of thyroid nodule-Monitoring  History of gastric surgery-revision of lap band 2013  Status post embolization of uterine artery  H/O laparoscopic adjustable gastric banding  History of  section    Home Medications:  Mounjaro 10 mg/0.5 mL subcutaneous solution: 12.5 milligram(s) subcutaneously once a week (2024 10:18)  Nurtec ODT 75 mg oral tablet, disintegratin tab(s) orally prn (2024 10:18)    Vital Signs Last 24 Hrs  T(C): 36.8 (2024 13:08), Max: 36.8 (2024 13:08)  T(F): 98.2 (2024 13:08), Max: 98.2 (2024 13:08)  HR: 72 (2024 13:08) (72 - 72)  BP: 133/89 (2024 13:08) (133/89 - 133/89)  BP(mean): --  RR: 17 (2024 13:08) (17 - 17)  SpO2: 100% (2024 13:08) (100% - 100%)    Parameters below as of 2024 13:08  Patient On (Oxygen Delivery Method): room air      Physical Exam  General: WD, alert  Skin: Warm, dry and intact  Abd: Soft, ND, mild tenderness noted in left upper quadrant with palpation and mild left lower flank pain both controlled with pain medication  Extrem: No Edema                            10.8   4.80  )-----------( 187      ( 2024 13:35 )             33.7   02-04    144  |  109<H>  |  9   ----------------------------<  127<H>  3.8   |  26  |  0.86    Ca    8.7      2024 13:35    US Retroperitoneal Complete (24 @ 16:16) >  FINDINGS:  Right kidney: 11.0 cm. No renal mass, hydronephrosis or calculi.    Left kidney: 11.4 cm. Mild hydronephrosis identified. Punctate lower pole   echogenic foci seen. Previously described 1.4 cm pelvic calculus not   appreciated    Urinary bladder: Within normal limits. Bilateral ureteral jets are seen. .    IMPRESSION:  Punctate nonobstructing left lower pole calculi. Mild left hydronephrosis   with a left ureteral jet identified    < end of copied text >

## 2024-02-04 NOTE — ED PROVIDER NOTE - PSYCHIATRIC, MLM
Patient or parent (if patient is minor) notified of no acute fractures seen on xray the xray done during their immediate care visit.  Also advised to f/u with pcp or orthopedics if not improving in 7-10 days or sooner if any worsening or new symptoms of concern.  Jamaal Turk DO    
Alert and oriented to person, place, time/situation. normal mood and affect. no apparent risk to self or others.

## 2024-02-04 NOTE — ED PROVIDER NOTE - CLINICAL SUMMARY MEDICAL DECISION MAKING FREE TEXT BOX
patient with left-sided flank pain after recent lithotripsy ,   will assess for infection and assess kidney function, and pain control, consulted urology, consider further imaging, dispo pending workup and reeval.

## 2024-02-04 NOTE — ED ADULT NURSE NOTE - OBJECTIVE STATEMENT
Pt A&Ox4 presents to ED with complaints of left flank pain. Pt had recent lithotripsy on 2/1/24 and reports "the stent string was pulled." Pt endorses pain and ran out of her home pain medication. Pt reports she had bleeding that is no longer present. Denies chest pain, shortness of breath, fevers/chills, nausea/vomiting, diarrhea.

## 2024-02-04 NOTE — ED ADULT NURSE NOTE - NSICDXPASTSURGICALHX_GEN_ALL_CORE_FT
PAST SURGICAL HISTORY:  H/O laparoscopic adjustable gastric banding     History of  section     History of gastric surgery revision of lap band 3013    Status post embolization of uterine artery

## 2024-02-04 NOTE — ED PROVIDER NOTE - PATIENT PORTAL LINK FT
You can access the FollowMyHealth Patient Portal offered by [facility] by registering at the following website: [email or web address]. By joining [employer]’s FollowMyHealth portal, you will also be able to view your health information using other applications (apps) compatible with our system.

## 2024-02-04 NOTE — ED PROVIDER NOTE - CONSTITUTIONAL, MLM
Well appearing , awake , alert , oriented to person , place , time/situation and Appears uncomfortable . normal...

## 2024-02-04 NOTE — ED PROVIDER NOTE - NSFOLLOWUPINSTRUCTIONS_ED_ALL_ED_FT
follow up with Urology this week on Wednesday,  return for uncontrolled pain, fevers or other new or worsening symptoms of concern .

## 2024-02-04 NOTE — ED ADULT TRIAGE NOTE - CHIEF COMPLAINT QUOTE
s/p lithotripsy and stent 2/1 "that night, I had a panty liner on and it caught the string and pulled the stent out. I have just been in so much pain since so my urologist told me to come here for pain control." States she is urinating normally.

## 2024-02-04 NOTE — CONSULT NOTE ADULT - ASSESSMENT
42 yo female post lithotripsy for renal calculi and now with a dislodged stent.  Creatinine remains unchanged from pre op blood work and ultrasound done today shows mild hydronephrosis and no obstructing stones.    -Pain Control  -Follow up in office this week -office will reach out to her to scheduled appt 43F with PMH obesity s/p Lap band procedure 2011 and conversion to gastric sleeve, POD3 s/p URS and L stent placement 2/1 with removal of U stent 2/1 PM. Presenting with L flank pain since Thursday with some resolved dysuria, no SP tenderness, or other LUTS. No fevers, chills, nausea, vomiting, abd pain. No foul colored or turbid urine. No gross hematuria although patient had passage of small clots Friday and Saturday now resolved, urine color orange (pyridium).  AVSS, L flank pain on exam, no SP tenderness, abd soft, ND/NT. Cr: Stable at 0.86, WBC 4.8, Hgb 10.4. UA: nitrite neg, moderate leuk esterase, 99 WBC, 1092 RBC. RBUS 2/4: mild left hydro, punctate LLP stone fragments, L ureteral jet present. PVR 4.     - dc'd with pain medications, return precautions  - call patient on monday to evaluate pain   - CT A/P noncon if pain not improved by mid week

## 2024-02-05 LAB
CULTURE RESULTS: SIGNIFICANT CHANGE UP
SPECIMEN SOURCE: SIGNIFICANT CHANGE UP

## 2024-02-20 ENCOUNTER — RX RENEWAL (OUTPATIENT)
Age: 44
End: 2024-02-20

## 2024-02-26 RX ORDER — RIMEGEPANT SULFATE 75 MG/75MG
75 TABLET, ORALLY DISINTEGRATING ORAL
Qty: 8 | Refills: 2 | Status: ACTIVE | COMMUNITY
Start: 2021-11-18 | End: 1900-01-01

## 2024-03-08 ENCOUNTER — APPOINTMENT (OUTPATIENT)
Dept: UROLOGY | Facility: CLINIC | Age: 44
End: 2024-03-08
Payer: COMMERCIAL

## 2024-03-08 PROCEDURE — 99213 OFFICE O/P EST LOW 20 MIN: CPT

## 2024-03-08 PROCEDURE — 76775 US EXAM ABDO BACK WALL LIM: CPT

## 2024-03-08 NOTE — HISTORY OF PRESENT ILLNESS
[FreeTextEntry1] : Name MAGNUS GIRON MRN [id number]  Aug 15 1980 ------------------------------------------------------------------------------------------------------------------------------------------- Date of First Visit: 01/10/2024 PCP: Curt Estrada ------------------------------------------------------------------------------------------------------------------------------------------- CC: kidney stones  History of Present Illness: MAGNUS GIRON is a 43 year old female with PMH obesity (s/p lap band procedure in  then revision to sleeve in , on [facility]), anemia who presents for evaluation of kidney stones. She has been experiencing intermittent left flank pain for several months. In 2023 she went to [facility] ED. CT demonstrated a 5mm stone in the left renal pelvis. She recently underwent a renal US for stone surveillance which demonstrated a 1.4cm left renal pelvis stone. Planning to repeat Litholink this weekend. She is still bothered by intermittent left flank pain and is interested in procedures to treat the stone and possibly resolve the flank pain.  Imaging: CT scan from 10/26/2023 can be found in [facility] PACS. Findings: Previously described left renal 0.5 cm in stone, now in renal pelvis, which is mildly dilated. No ureteral stone.  Imaging: Renal US from 2024 can be found in [facility] PACS. Findings: 1.4 cm non obstructing stone is seen in the renal pelvis. There is no hydronephrosis. No renal mass or hydronephrosis  Previous urine cultures: 2023: No growth  Kidney Stone History: First-time stone former - No Concurrent asymptomatic stone(s) - Yes Previous stone surgeries - No Previous passed stones - Yes - twice Comorbidities - non-contributory Family history of kidney stones - Sister Previous metabolic evaluation - Yes Previous 24-hour urine findings - 2023: Low urine output, mild hypocitraturia ------------------------------------------------------------------------------------------------------------------------------------------- Interval History (2024): CT shows a 7 mm left renal pelvic stone with mild RP distention without caliectasis. She continues to have intermittent pain, likely related to stone position in the renal pelvis. She is interested in treatment. [facility] only notable for asthma. ----------------------------------------------------------------------------------------------------------------------------------------------------------------  Interval History (2024): Patient presents for follow-up kidney stone surveillance visit with renal ultrasound. Doing well, no complaints. No recent stone-related events.   Ultrasound performed in the office today demonstrates no evidence of hydronephrosis bilaterally.  Punctate 1 mm echogenic focus without shadowing in left lower pole.  Results of 24-hour urine analysis (completed 1/15/24) demonstrate:  -Inadequate urine volume: 0.76 L/day (from 0.58) -Appropriate urinary sodium: 123 mmol/day -Normal urinary calcium: 188 mg/day -Normal urinary oxalate: 32 mg/day -Normal urinary citrate: 689 mg/day -PCR: 0.8 mg/kg/day  -Normal urinary uric acid: 0.474 g/day -Urinary pH: 6.375

## 2024-03-08 NOTE — ASSESSMENT
[FreeTextEntry1] : Assessment:   MAGNUS GIRON is a 42 y/o F with a history of kidney stone disease. Small 1 mm fragment in left lower pole from prior URS, yet to pass. Poor fluid intake.     Plan:   -Follow up visit in 6 months with renal ultrasound -Follow up with Dr. Salmeron as scheduled -Continue with generalized stone prevention strategies as previously discussed (increase fluid intake to keep urine clear or very faint yellow, limit dietary salt intake, increase fruits and vegetables, limit high oxalate foods, maintain normal dietary calcium, and moderation of non-dairy animal protein)  -Emphasis on fluid intake to maintain adequate urine volumes

## 2024-04-17 ENCOUNTER — NON-APPOINTMENT (OUTPATIENT)
Age: 44
End: 2024-04-17

## 2024-04-29 ENCOUNTER — APPOINTMENT (OUTPATIENT)
Dept: NEPHROLOGY | Facility: CLINIC | Age: 44
End: 2024-04-29
Payer: COMMERCIAL

## 2024-04-29 VITALS — HEART RATE: 79 BPM | DIASTOLIC BLOOD PRESSURE: 81 MMHG | SYSTOLIC BLOOD PRESSURE: 108 MMHG

## 2024-04-29 VITALS — BODY MASS INDEX: 30.79 KG/M2 | WEIGHT: 185 LBS

## 2024-04-29 DIAGNOSIS — E66.9 OBESITY, UNSPECIFIED: ICD-10-CM

## 2024-04-29 DIAGNOSIS — R82.991 HYPOCITRATURIA: ICD-10-CM

## 2024-04-29 DIAGNOSIS — R31.29 OTHER MICROSCOPIC HEMATURIA: ICD-10-CM

## 2024-04-29 DIAGNOSIS — R34 ANURIA AND OLIGURIA: ICD-10-CM

## 2024-04-29 DIAGNOSIS — N20.0 CALCULUS OF KIDNEY: ICD-10-CM

## 2024-04-29 PROCEDURE — 99214 OFFICE O/P EST MOD 30 MIN: CPT

## 2024-04-29 NOTE — HISTORY OF PRESENT ILLNESS
[FreeTextEntry1] : Kindly referred by Dr. Estrada for stones.   * Urolithotomy Dr. Frey in February 2024. No renal colic. January 2024 Litholink: 760 ml. CaPhos 4.08, pH 6.34, Ca Ox 16. * 35 mg albuminuria. * She is attempting to increase fluid intake. Off topamax with citrate improvement.   Previous history (02Jan24): * Went to ER in September 2023 for pain and found to have small nonobstructing 4 mm left kidney stone. Pain resolved. * BP controlled.  - 120s at home. No lightheadedness. No CP/SOB. Compliant with medications. * Now off topamax. * Weight improved on mounjaro.   Previous history (29Aug23): * BP controlled.  110s at home. No lightheadedness. No CP/SOB. Compliant with medications. * No  lear  renal colic. * Obesity improved on mounjaro. * Litholink previously showed low volume, low citrate.  * U/A last visit showed microsocpic hematuria. Slight back discomfort (lower left back) not like kidney stone for 3 days about 2 weeks ago.   Previous history (25Jul23): * eGFR 80s. No sx of kidney stones. Obesity improved on mounjaro. BP controlled. BP 110s at home. No lightheadedness. No CP/SOB.Compliant with medications. * Litholink showed low volume, low citrate.   Previous history (01Feb23): * BP controlled. BP 110s at home. No lightheadedness. No CP/SOB. Compliant with medications. * No kidney stones on topamax. She has stopped this. * Low urine volume. * eGFR by EK[facility] eGFRcys equation is 100. * Obesity improved on mounjaro. * BP controlled.  No lightheadedness. No CP/SOB. Compliant with medications. *  Previous history (29Dec22): In December 2021 her creatinine was 1.03. U/A nl. Her creatinine increased to 1.19 (eGFR 58) 11/14/22.   She had kidney stones in 2011. On topamax for migraines.  No recent stones or colic.   The patient denies exposure to chronic NSAIDs, chronic PPIs, green smoothies, creatine, or herbal supplements. The patient denies a history of pyelonephritis. No HTN or DM. No significant nocturia. No recent renal ultrasound. They are unaware of proteinuria or hematuria.  Sees Dr. Ramirez for obesity. On Mounjaro. No DM.   She had protein in the urine 20 years ago while pregnant but does not remember being told she had preeclampsia.

## 2024-04-29 NOTE — ASSESSMENT
[FreeTextEntry1] : # Nephroithiasis with low volume.  * High fluid intake. Increase intake to 2 - 3 L/+. * Hypocitraturia improved with discontinuation of topamax.  * Will plan on rechecking litholink/ * Follow up in 4 months.   # Obesity. * Weight loss. * Cont mounjaro.  # Hematuria. * C/w urolithiasis. * recheck.

## 2024-05-06 NOTE — ED PROVIDER NOTE - NS ED ATTENDING STATEMENT MOD
Patient Progress Note      CC: DM      Referring Provider  Shea Carroll  [address] [place] [address]     History of Present Illness:   Barry Roland is a 55 y.o. male with a history of type 1 diabetes with long term use of insulin. Diabetes course has been stable. Complications of DM: none reported. Has history of bariatric surgery. Denies any issues with his current regimen. Home glucose monitoring: are performed regularly, has Dexcom sensor.      Average glucose 169 mg/dL   In range 62%, above range 37%, below range 1%     Current regimen: Novolog via pump  Metformin and Jardiance were stopped after bariatric surgery.  compliant most of the time, denies any side effects from medications.      Patient is on a TSlim pump.  He denies any malfunctioning of the pump.    Current Insulin pump settings:  Basal rate:  12:00 a.m. = 0.800 units/hour  4:00 a.m. = 1.000 units/hour  6:00 a.m. = 1.000 units/hour  8:00 a.m. = 1.500 units/hour  9:00 p.m. = 1.000 units/hour  Insulin to carb ratio:  12:00 a.m. = 7  4:00 a.m. = 7  6:00 a.m. = 6  8:00 a.m. = 7.5  9:00 p.m. = 7  Insulin sensitivity factor:  12:00 a.m. = 40  4:00 a.m. = 40  6:00 a.m. = 25  8:00 a.m. = 25  9:00 p.m. = 40  BG target:    12:00 a.m. = 135  4:00 a.m. = 135  6:00 a.m. = 125  8:00 a.m. = 115  9:00 p.m. = 135     Discussed with patient in case of malfunctioning of the pump to use basal and bolus therapy as backup which is prescribed to the patient. Also notified patient to call clinic if any issues.      Hypoglycemic episodes: No, rare  H/o of hypoglycemia causing hospitalization or Intervention such as glucagon injection or ambulance call :  Yes (did not go to the ER but did have a syncopal episode)  Hypoglycemia symptoms: weakness/sweating   Treatment of hypoglycemia: discussed treatment      Medic alert tag: recommended: Yes     Diabetes education: Not recently   Diet: 3-4 meals per day (small meals), 2-3 snacks per day. Timing  of meals is variable  Diabetic diet compliance:  is complaint with boluses most of the time (he is bolusing after meals because after the gastric bypass he is unable to  how much he will be able to eat)  Activity: Daily activity is predictable: Yes. he is walking at least 3 times a week.      Ophthamology: sees routinely; (Dr. Shaffer), Dec 2022  Podiatry: July 2023     Has HTN: on ACE inhibitor/ARB   Has hyperlipidemia: on Crestor  Thyroid disorders: Hypothyroidism. Patient is on Synthroid 200 mcg daily on empty stomach 1 hour before breakfast. Thyroid function tests in normal range.  History of pancreatitis: No     Vitamin D deficiency: is currently taking a bariatric multivitamin         Patient Active Problem List   Diagnosis    Chronic back pain    Collagenous colitis    GERD (gastroesophageal reflux disease)    Hyperlipidemia    Hypothyroidism    Insomnia    Iron deficiency anemia    BMI 40.0-44.9, adult ([facility])    Type 1 diabetes mellitus with diabetic neuropathy ([facility])    Vitamin D deficiency    Seasonal allergic rhinitis due to pollen    FARZANA (obstructive sleep apnea)    Renal calculus, bilateral    Leukocytosis (leucocytosis)    Erectile dysfunction    Insulin pump in place    Gastric bypass status for obesity    Intention tremor    Hypertension      Past Medical History:   Diagnosis Date    Anemia     Axillary adenopathy 8/15/2022    Closed fracture of one rib of left side with routine healing 8/29/2022    Colon polyp     Diabetes mellitus (HCC)     Disease of thyroid gland     GERD (gastroesophageal reflux disease)     Hyperlipidemia     Hypertension     Insomnia     Kidney stone     Lymphadenopathy of head and neck 8/15/2022    Sleep apnea     Mild sleep apnea    Type 1 diabetes mellitus (HCC)     Uses Insulin Pump    Vitamin D deficiency       Past Surgical History:   Procedure Laterality Date    BARIATRIC SURGERY      CARPAL TUNNEL RELEASE Right     COLONOSCOPY      EGD      FL RETROGRADE PYELOGRAM   2020    LASIK      KS CYSTO/URETERO W/LITHOTRIPSY &INDWELL STENT INSRT Right 2020    Procedure: CYSTOSCOPY URETEROSCOPY WITH LITHOTRIPSY HOLMIUM LASER, RETROGRADE PYELOGRAM AND INSERTION STENT URETERAL;  Surgeon: Shabbir Ireland MD;  Location: [facility];  Service: Urology    KS LAPS GSTR RSTCV PX W/BYP GABBY-EN-Y LIMB <150 CM N/A 2021    Procedure: BYPASS GASTRIC GABBY-EN-Y LAPAROSCOPIC W ROBOTICS AND INTRAOPERATIVE EGD;  Surgeon: Danny Antonio MD;  Location: [facility];  Service: Bariatrics    ROTATOR CUFF REPAIR Right     UPPER GASTROINTESTINAL ENDOSCOPY        Family History   Problem Relation Age of Onset    Thyroid disease unspecified Mother     Osteoporosis Mother     Hypertension Father     Colon cancer Father 68    Diabetes type II Sister     Hypertension Sister     Hyperlipidemia Sister     Thyroid disease unspecified Sister     Diabetes type II Brother     Hypertension Brother     Hyperlipidemia Brother     Colon cancer Brother 60     Social History     Tobacco Use    Smoking status: Former     Current packs/day: 0.00     Types: Cigarettes     Start date:      Quit date:      Years since quittin.3    Smokeless tobacco: Never    Tobacco comments:     quit in    Substance Use Topics    Alcohol use: Yes     Alcohol/week: 2.0 standard drinks of alcohol     Types: 2 Glasses of wine per week     Comment: social     Allergies   Allergen Reactions    Ibuprofen GI Intolerance and Nausea Only    Other Other (See Comments)     Seasonal allergies- pt has congestion         Current Outpatient Medications:     Biotin 97158 MCG TABS, Take 10,000 mcg by mouth in the morning, Disp: , Rfl:     Blood Glucose Monitoring Suppl (ONE TOUCH ULTRA 2) w/Device KIT, Use  , Disp: , Rfl:     Calcium Citrate (JORDY-CITRATE PO), Take 1,000 tablets by mouth 2 (two) times a day, Disp: , Rfl:     Continuous Blood Gluc  (DEXCOM G6 ) KIYA, USE AS DIRECTED, Disp: 1 Device, Rfl: 0     Continuous Blood Gluc Sensor (Dexcom G7 Sensor), Use 1 Device every 10 days, Disp: 9 each, Rfl: 3    Continuous Blood Gluc Transmit (Dexcom G6 Transmitter) MISC, Inject 1 Device under the skin every 3 (three) months, Disp: 1 each, Rfl: 3    fluticasone (FLONASE) 50 mcg/act nasal spray, 1 spray into each nostril daily, Disp: , Rfl:     Glucagon (Baqsimi Two Pack) 3 MG/DOSE POWD, Use one dose as needed in case of severe hypoglycemia ( nasal spray), Disp: 1 each, Rfl: 3    insulin glargine (Basaglar KwikPen) 100 units/mL injection pen, Inject 32 units in case of pump failure ( incase of emergency), Disp: 15 mL, Rfl: 0    Insulin Pen Needle (BD PEN NEEDLE LYRIC U/F) 32G X 4 MM MISC, by Does not apply route daily Use once daily, Disp: 100 each, Rfl: 2    lisinopril-hydrochlorothiazide (PRINZIDE,ZESTORETIC) 20-12.5 MG per tablet, Take 1 tablet by mouth daily, Disp: 90 tablet, Rfl: 1    loratadine (CLARITIN) 10 mg tablet, Take 1 tablet (10 mg total) by mouth daily, Disp: 90 tablet, Rfl: 1    Multiple Vitamins-Minerals (Bariatric Fusion) CHEW, Chew  , Disp: , Rfl:     NovoLOG 100 UNIT/ML injection, USE UP TO 78 UNITS DAILY VIA INSULIN PUMP, Disp: 70 mL, Rfl: 3    Probiotic Product (PROBIOTIC DAILY PO), Take 1 tablet by mouth daily , Disp: , Rfl:     psyllium (METAMUCIL) 0.52 g capsule, Take 0.52 g by mouth 3 (three) times a day Fiber capsules, Disp: , Rfl:     rosuvastatin (CRESTOR) 5 mg tablet, Take 1 tablet (5 mg total) by mouth daily, Disp: 90 tablet, Rfl: 1    Synthroid 200 MCG tablet, Take 1 tablet daily Mon-Sat and 1.5 tablets on Sunday., Disp: , Rfl:     TURMERIC PO, Take 1,400 capsules by mouth 2 (two) times a day, Disp: , Rfl:     vitamin B-12 (VITAMIN B-12) 1,000 mcg tablet, Take 1,000 mcg by mouth 3 (three) times a week, Disp: , Rfl:     zolpidem (AMBIEN) 10 mg tablet, Take 1 tablet (10 mg total) by mouth daily at bedtime as needed for sleep, Disp: 30 tablet, Rfl: 0  Review of Systems   Constitutional:  Negative  "for activity change, appetite change, fatigue and unexpected weight change.   HENT:  Negative for trouble swallowing.    Eyes:  Negative for visual disturbance.   Respiratory:  Negative for shortness of breath.    Cardiovascular:  Negative for chest pain and palpitations.   Gastrointestinal:  Positive for constipation. Negative for diarrhea.   Endocrine: Negative for polydipsia and polyuria.   Musculoskeletal: Negative.    Skin:  Negative for wound.   Neurological:  Positive for numbness.   Psychiatric/Behavioral: Negative.         Physical Exam:  Body mass index is 30.72 kg/m².  /84   Pulse 69   Ht 5' 9\" (1.753 m)   Wt 94.3 kg (208 lb)   SpO2 96%   BMI 30.72 kg/m²    Wt Readings from Last 3 Encounters:   05/06/24 94.3 kg (208 lb)   02/12/24 94.3 kg (208 lb)   01/19/24 94.6 kg (208 lb 9.6 oz)       Physical Exam  Vitals and nursing note reviewed.   Constitutional:       Appearance: He is well-developed.   HENT:      Head: Normocephalic.   Eyes:      General: No scleral icterus.     Pupils: Pupils are equal, round, and reactive to light.   Neck:      Thyroid: No thyromegaly.   Cardiovascular:      Rate and Rhythm: Normal rate and regular rhythm.      Pulses:           Radial pulses are 2+ on the right side and 2+ on the left side.      Heart sounds: No murmur heard.  Pulmonary:      Effort: Pulmonary effort is normal. No respiratory distress.      Breath sounds: Normal breath sounds. No wheezing.   Musculoskeletal:      Cervical back: Neck supple.   Skin:     General: Skin is warm and dry.   Neurological:      Mental Status: He is alert.       Patient's shoes and socks were not removed.          Labs:   Component      Latest Ref Rng 9/20/2023 2/12/2024 5/2/2024   Sodium      135 - 147 mmol/L 138   137    Potassium      3.5 - 5.3 mmol/L 4.1   4.5    Chloride      96 - 108 mmol/L 102   102    Carbon Dioxide      21 - 32 mmol/L 30   30    ANION GAP      4 - 13 mmol/L 6   5    BUN      5 - 25 mg/dL 20   17  "   Creatinine      0.60 - 1.30 mg/dL 0.97   0.98    GLUCOSE, FASTING      65 - 99 mg/dL 133 (H)   133 (H)    Calcium      8.4 - 10.2 mg/dL 9.1   9.4    AST      13 - 39 U/L 38      ALT      7 - 52 U/L 36      ALK PHOS      34 - 104 U/L 63      Total Protein      6.4 - 8.4 g/dL 6.9      Albumin      3.5 - 5.0 g/dL 4.2      Total Bilirubin      0.20 - 1.00 mg/dL 0.52      GFR, Calculated      ml/min/1.73sq m 86   85    Cholesterol      See Comment mg/dL 137      Triglycerides      See Comment mg/dL 63      HDL      >=40 mg/dL 46      LDL Calculated      0 - 100 mg/dL 78      Non-HDL Cholesterol      mg/dl 91      Hemoglobin A1C      Normal 4.0-5.6%; PreDiabetic 5.7-6.4%; Diabetic >=6.5%; Glycemic control for adults with diabetes <7.0% % 7.9 (H)  7.5 !  7.5 (H)    eAG, EST AVG Glucose      mg/dl 180   169    FREE T4      0.61 - 1.12 ng/dL 0.90   0.81    TSH 3RD GENERATON      0.450 - 4.500 uIU/mL 6.848 (H)   5.957 (H)    VITAMIN D, 25-HYDROXY      30.0 - 100.0 ng/mL 55.6         Legend:  (H) High  ! Abnormal      Plan:    Diagnoses and all orders for this visit:    Type 1 diabetes mellitus with diabetic neuropathy (HCC)  HGA1C 7.5%. Remained the Same.  Treatment regimen: No episodes of hypoglycemia are due to missing food bolus or bolusing after meal.  Advised patient to not miss boluses.  Continue current treatment for now.  Discussed intensive insulin regimen does increase risk for hypoglycemia. Episodes of hypoglycemia can lead to permanent disability and death.  Discussed risks/complications associated with uncontrolled diabetes.    Advised to adhere to diabetic diet, and recommended staying active/exercising routinely.  Keep carbohydrates consistent to limit blood glucose fluctuations.  Advised to call if blood sugars less than 70 mg/dl or over 300 mg/dl.   Check blood glucose 3+ times a day  Discussed symptoms and treatment of hypoglycemia.   Discussed use of CGM to collect additional blood glucose data to reveal  trends and patterns that can be used to optimize treatment plan.   Recommended routine follow-up with podiatry and ophthalmology.   Download CGM/pump in 2 weeks.    Ordered blood work to complete prior to next visit.  -     Hemoglobin A1C; Future  -     Albumin / creatinine urine ratio; Future  -     Basic metabolic panel; Future    Acquired hypothyroidism  TSH 5.957 and free T4 0.81  Increase Synthroid by an extra 1/2 tablet on Sunday   Repeat TFTs in 6 weeks  -     T4, free; Future  -     TSH, 3rd generation; Future  -     Synthroid 200 MCG tablet; Take 1 tablet daily Mon-Sat and 1.5 tablets on Sunday.    Primary hypertension  Blood pressure well controlled, continue current treatment     Mixed hyperlipidemia  LDL previously 78  Continue statin therapy  Managed by PCP        Discussed with the patient diagnosis and treatment and all questions fully answered. He will call me if any problems arise.    Counseled patient on diagnostic results, prognosis, risk and benefit of treatment options, instruction for management, importance of treatment compliance, risk factor reduction and impressions.      Alison Charles PA-C   Attending Only

## 2024-05-09 LAB
APPEARANCE: CLEAR
BILIRUBIN URINE: NEGATIVE
BLOOD URINE: NEGATIVE
COLOR: YELLOW
CREAT SPEC-SCNC: 198 MG/DL
GLUCOSE QUALITATIVE U: NEGATIVE MG/DL
KETONES URINE: NEGATIVE MG/DL
LEUKOCYTE ESTERASE URINE: NEGATIVE
MICROALBUMIN 24H UR DL<=1MG/L-MCNC: <1.2 MG/DL
MICROALBUMIN/CREAT 24H UR-RTO: NORMAL MG/G
NITRITE URINE: NEGATIVE
PH URINE: 6
PROTEIN URINE: NEGATIVE MG/DL
SPECIFIC GRAVITY URINE: 1.03
UROBILINOGEN URINE: 1 MG/DL

## 2024-05-16 ENCOUNTER — TRANSCRIPTION ENCOUNTER (OUTPATIENT)
Age: 44
End: 2024-05-16

## 2024-05-17 RX ORDER — TIRZEPATIDE 15 MG/.5ML
15 INJECTION, SOLUTION SUBCUTANEOUS
Qty: 3 | Refills: 2 | Status: ACTIVE | COMMUNITY
Start: 2023-07-06 | End: 1900-01-01

## 2024-06-03 ENCOUNTER — TRANSCRIPTION ENCOUNTER (OUTPATIENT)
Age: 44
End: 2024-06-03

## 2024-07-01 ENCOUNTER — TRANSCRIPTION ENCOUNTER (OUTPATIENT)
Age: 44
End: 2024-07-01

## 2024-07-03 ENCOUNTER — TRANSCRIPTION ENCOUNTER (OUTPATIENT)
Age: 44
End: 2024-07-03

## 2024-07-09 ENCOUNTER — TRANSCRIPTION ENCOUNTER (OUTPATIENT)
Age: 44
End: 2024-07-09

## 2024-07-10 ENCOUNTER — TRANSCRIPTION ENCOUNTER (OUTPATIENT)
Age: 44
End: 2024-07-10

## 2024-07-16 ENCOUNTER — APPOINTMENT (OUTPATIENT)
Dept: ENDOCRINOLOGY | Facility: CLINIC | Age: 44
End: 2024-07-16
Payer: COMMERCIAL

## 2024-07-16 VITALS
DIASTOLIC BLOOD PRESSURE: 85 MMHG | HEART RATE: 91 BPM | BODY MASS INDEX: 31.45 KG/M2 | SYSTOLIC BLOOD PRESSURE: 128 MMHG | WEIGHT: 189 LBS

## 2024-07-16 DIAGNOSIS — E04.2 NONTOXIC MULTINODULAR GOITER: ICD-10-CM

## 2024-07-16 DIAGNOSIS — N20.0 CALCULUS OF KIDNEY: ICD-10-CM

## 2024-07-16 DIAGNOSIS — E66.9 OBESITY, UNSPECIFIED: ICD-10-CM

## 2024-07-16 PROCEDURE — 36415 COLL VENOUS BLD VENIPUNCTURE: CPT

## 2024-07-16 PROCEDURE — 99214 OFFICE O/P EST MOD 30 MIN: CPT | Mod: 25

## 2024-07-18 ENCOUNTER — TRANSCRIPTION ENCOUNTER (OUTPATIENT)
Age: 44
End: 2024-07-18

## 2024-07-18 LAB
25(OH)D3 SERPL-MCNC: 87.4 NG/ML
ALBUMIN SERPL ELPH-MCNC: 4.4 G/DL
ALP BLD-CCNC: 68 U/L
ALT SERPL-CCNC: 11 U/L
ANION GAP SERPL CALC-SCNC: 10 MMOL/L
AST SERPL-CCNC: 16 U/L
BILIRUB SERPL-MCNC: 0.2 MG/DL
BUN SERPL-MCNC: 14 MG/DL
CALCIUM SERPL-MCNC: 9.6 MG/DL
CALCIUM SERPL-MCNC: 9.7 MG/DL
CHLORIDE SERPL-SCNC: 103 MMOL/L
CHOLEST SERPL-MCNC: 257 MG/DL
CO2 SERPL-SCNC: 27 MMOL/L
CREAT SERPL-MCNC: 0.98 MG/DL
EGFR: 73 ML/MIN/1.73M2
ESTIMATED AVERAGE GLUCOSE: 91 MG/DL
GLUCOSE SERPL-MCNC: 92 MG/DL
HBA1C MFR BLD HPLC: 4.8 %
HDLC SERPL-MCNC: 82 MG/DL
LDLC SERPL CALC-MCNC: 164 MG/DL
NONHDLC SERPL-MCNC: 175 MG/DL
PARATHYROID HORMONE INTACT: 31 PG/ML
POTASSIUM SERPL-SCNC: 4.7 MMOL/L
PROT SERPL-MCNC: 7.6 G/DL
SODIUM SERPL-SCNC: 139 MMOL/L
TRIGL SERPL-MCNC: 70 MG/DL
TSH SERPL-ACNC: 0.86 UIU/ML

## 2024-07-25 ENCOUNTER — TRANSCRIPTION ENCOUNTER (OUTPATIENT)
Age: 44
End: 2024-07-25

## 2024-08-21 ENCOUNTER — TRANSCRIPTION ENCOUNTER (OUTPATIENT)
Age: 44
End: 2024-08-21

## 2024-08-22 ENCOUNTER — TRANSCRIPTION ENCOUNTER (OUTPATIENT)
Age: 44
End: 2024-08-22

## 2024-08-23 ENCOUNTER — TRANSCRIPTION ENCOUNTER (OUTPATIENT)
Age: 44
End: 2024-08-23

## 2024-08-26 ENCOUNTER — APPOINTMENT (OUTPATIENT)
Dept: NEPHROLOGY | Facility: CLINIC | Age: 44
End: 2024-08-26
Payer: COMMERCIAL

## 2024-08-26 VITALS — HEART RATE: 68 BPM | SYSTOLIC BLOOD PRESSURE: 109 MMHG | DIASTOLIC BLOOD PRESSURE: 78 MMHG

## 2024-08-26 DIAGNOSIS — N20.0 CALCULUS OF KIDNEY: ICD-10-CM

## 2024-08-26 DIAGNOSIS — R80.9 PROTEINURIA, UNSPECIFIED: ICD-10-CM

## 2024-08-26 DIAGNOSIS — E66.9 OBESITY, UNSPECIFIED: ICD-10-CM

## 2024-08-26 DIAGNOSIS — R34 ANURIA AND OLIGURIA: ICD-10-CM

## 2024-08-26 DIAGNOSIS — R31.29 OTHER MICROSCOPIC HEMATURIA: ICD-10-CM

## 2024-08-26 PROCEDURE — G2211 COMPLEX E/M VISIT ADD ON: CPT | Mod: NC

## 2024-08-26 PROCEDURE — 99214 OFFICE O/P EST MOD 30 MIN: CPT

## 2024-08-26 NOTE — HISTORY OF PRESENT ILLNESS
[FreeTextEntry1] : Kindly referred by Dr. Estrada for stones.   * No symptoms of renal colic. * January 2024 Litholink: 760 ml. CaPhos 4.08, pH 6.34, Ca Ox 16. . * She is attempting to increase fluid intake. Off topamax with citrate improvement. * No albuminuria on repeat labs.   Previous history (29Apr24): * Urolithotomy Dr. Frey in February 2024. No renal colic. January 2024 Litholink: 760 ml. CaPhos 4.08, pH 6.34, Ca Ox 16. * 35 mg albuminuria. * She is attempting to increase fluid intake. Off topamax with citrate improvement.   Previous history (02Jan24): * Went to ER in September 2023 for pain and found to have small nonobstructing 4 mm left kidney stone. Pain resolved. * BP controlled.  - 120s at home. No lightheadedness. No CP/SOB. Compliant with medications. * Now off topamax. * Weight improved on mounjaro.   Previous history (29Aug23): * BP controlled.  110s at home. No lightheadedness. No CP/SOB. Compliant with medications. * No  lear  renal colic. * Obesity improved on mounjaro. * Litholink previously showed low volume, low citrate.  * U/A last visit showed microsocpic hematuria. Slight back discomfort (lower left back) not like kidney stone for 3 days about 2 weeks ago.   Previous history (25Jul23): * eGFR 80s. No sx of kidney stones. Obesity improved on mounjaro. BP controlled. BP 110s at home. No lightheadedness. No CP/SOB.Compliant with medications. * Litholink showed low volume, low citrate.   Previous history (01Feb23): * BP controlled. BP 110s at home. No lightheadedness. No CP/SOB. Compliant with medications. * No kidney stones on topamax. She has stopped this. * Low urine volume. * eGFR by EK eGFRcys equation is 100. * Obesity improved on mounjaro. * BP controlled.  No lightheadedness. No CP/SOB. Compliant with medications. *  Previous history (29Dec22): In December 2021 her creatinine was 1.03. U/A nl. Her creatinine increased to 1.19 (eGFR 58) 11/14/22.   She had kidney stones in 2011. On topamax for migraines.  No recent stones or colic.   The patient denies exposure to chronic NSAIDs, chronic PPIs, green smoothies, creatine, or herbal supplements. The patient denies a history of pyelonephritis. No HTN or DM. No significant nocturia. No recent renal ultrasound. They are unaware of proteinuria or hematuria.  Sees Dr. Ramirez for obesity. On Mounjaro. No DM.   She had protein in the urine 20 years ago while pregnant but does not remember being told she had preeclampsia.

## 2024-08-26 NOTE — ASSESSMENT
[FreeTextEntry1] : # Nephroithiasis with low volume. * High fluid intake. Increase intake to 2 - 3 L/+. * Hypocitraturia improved with discontinuation of topamax. * Recheck litholink. * Kidney stone diet prescribed.  * Follow up in 4 months.  # Obesity. * Weight loss. * Cont GLP1 agonist.  # Hematuria. * C/w urolithiasis. * recheck.  # Albuminuria improved. * Recheck.

## 2024-08-31 LAB
APPEARANCE: CLEAR
BILIRUBIN URINE: NEGATIVE
BLOOD URINE: NEGATIVE
COLOR: YELLOW
CREAT SPEC-SCNC: 257 MG/DL
GLUCOSE QUALITATIVE U: NEGATIVE MG/DL
KETONES URINE: NEGATIVE MG/DL
LEUKOCYTE ESTERASE URINE: NEGATIVE
MICROALBUMIN 24H UR DL<=1MG/L-MCNC: <1.2 MG/DL
MICROALBUMIN/CREAT 24H UR-RTO: NORMAL MG/G
NITRITE URINE: NEGATIVE
PH URINE: 5.5
PROTEIN URINE: NORMAL MG/DL
SPECIFIC GRAVITY URINE: >1.03
UROBILINOGEN URINE: 1 MG/DL

## 2024-09-11 ENCOUNTER — TRANSCRIPTION ENCOUNTER (OUTPATIENT)
Age: 44
End: 2024-09-11

## 2024-09-25 ENCOUNTER — APPOINTMENT (OUTPATIENT)
Dept: UROLOGY | Facility: CLINIC | Age: 44
End: 2024-09-25
Payer: COMMERCIAL

## 2024-09-25 PROCEDURE — 76775 US EXAM ABDO BACK WALL LIM: CPT

## 2024-09-25 PROCEDURE — 99213 OFFICE O/P EST LOW 20 MIN: CPT

## 2024-09-25 NOTE — ASSESSMENT
[FreeTextEntry1] : Assessment: MAGNUS GIRON is a 44-year-old F with a history of kidney stone disease and a stable 2 mm stone in the left kidney, residual fragment from prior URS.     Plan: -Follow up visit in 1 year with renal ultrasound -Continue with generalized stone prevention strategies as previously discussed (increase fluid intake to keep urine clear or very faint yellow, limit dietary salt intake, increase fruits and vegetables, limit high oxalate foods, maintain normal dietary calcium, and moderation of non-dairy animal protein) -Emphasis on fluid intake to maintain adequate urine volumes -Follow up with Dr. Salmeron as scheduled

## 2024-09-25 NOTE — ADDENDUM
[FreeTextEntry1] : I, Dr. Tor Frey, personally performed the evaluation and management (E/M) services for this established patient with new problem(s)/exacerbation of existing condition(s). That E/M includes conducting the clinically appropriate interval history &/or physical exam, assessing all new/exacerbated conditions, and establishing a new care plan. My NP, Sylvia Guy, was here to observe my E/M services for this patient.

## 2024-09-25 NOTE — HISTORY OF PRESENT ILLNESS
[FreeTextEntry1] : Name MAGNUS GIRON MRN [id number]  Aug 15 1980 ------------------------------------------------------------------------------------------------------------------------------------------- Date of First Visit: 01/10/2024 PCP: Curt Estrada ------------------------------------------------------------------------------------------------------------------------------------------- CC: kidney stones  History of Present Illness: MAGNUS GIRON is a 43 year old female with PMH obesity (s/p lap band procedure in  then revision to sleeve in , on [facility]), anemia who presents for evaluation of kidney stones. She has been experiencing intermittent left flank pain for several months. In 2023 she went to [facility] ED. CT demonstrated a 5mm stone in the left renal pelvis. She recently underwent a renal US for stone surveillance which demonstrated a 1.4cm left renal pelvis stone. Planning to repeat Litholink this weekend. She is still bothered by intermittent left flank pain and is interested in procedures to treat the stone and possibly resolve the flank pain.  Imaging: CT scan from 10/26/2023 can be found in [facility] PACS. Findings: Previously described left renal 0.5 cm in stone, now in renal pelvis, which is mildly dilated. No ureteral stone.  Imaging: Renal US from 2024 can be found in [facility] PACS. Findings: 1.4 cm non obstructing stone is seen in the renal pelvis. There is no hydronephrosis. No renal mass or hydronephrosis  Previous urine cultures: 2023: No growth  Kidney Stone History: First-time stone former - No Concurrent asymptomatic stone(s) - Yes Previous stone surgeries - No Previous passed stones - Yes - twice Comorbidities - non-contributory Family history of kidney stones - Sister Previous metabolic evaluation - Yes Previous 24-hour urine findings - 2023: Low urine output, mild hypocitraturia ------------------------------------------------------------------------------------------------------------------------------------------- Interval History (2024): CT shows a 7 mm left renal pelvic stone with mild RP distention without caliectasis. She continues to have intermittent pain, likely related to stone position in the renal pelvis. She is interested in treatment. [facility] only notable for asthma. ---------------------------------------------------------------------------------------------------------------------------------------------------------------- Interval History (2024): Patient presents for follow-up kidney stone surveillance visit with renal ultrasound. Doing well, no complaints. No recent stone-related events. Ultrasound performed in the office today demonstrates no evidence of hydronephrosis bilaterally. Punctate 1 mm echogenic focus without shadowing in left lower pole.  Results of 24-hour urine analysis (completed 1/15/24) demonstrate: -Inadequate urine volume: 0.76 L/day (from 0.58) -Appropriate urinary sodium: 123 mmol/day -Normal urinary calcium: 188 mg/day -Normal urinary oxalate: 32 mg/day -Normal urinary citrate: 689 mg/day -PCR: 0.8 mg/kg/day -Normal urinary uric acid: 0.474 g/day -Urinary pH: 6.375 ---------------------------------------------------------------------------------------------------------------------------------------------------------------- Interval History (2024): Patient presents for follow-up kidney stone surveillance visit with renal ultrasound. Doing well, no complaints. No recent stone-related events. Ultrasound performed in the office today demonstrates no evidence of hydronephrosis bilaterally. 2 mm echogenic focus in left mid pole (prev 1 mm in LLP).

## 2024-10-14 ENCOUNTER — APPOINTMENT (OUTPATIENT)
Dept: ENDOCRINOLOGY | Facility: CLINIC | Age: 44
End: 2024-10-14

## 2024-10-14 VITALS
HEART RATE: 85 BPM | SYSTOLIC BLOOD PRESSURE: 137 MMHG | DIASTOLIC BLOOD PRESSURE: 95 MMHG | WEIGHT: 203 LBS | BODY MASS INDEX: 33.78 KG/M2

## 2024-10-14 DIAGNOSIS — E04.2 NONTOXIC MULTINODULAR GOITER: ICD-10-CM

## 2024-10-14 DIAGNOSIS — E66.812 OBESITY, CLASS 2: ICD-10-CM

## 2024-10-14 PROCEDURE — 99214 OFFICE O/P EST MOD 30 MIN: CPT

## 2024-10-26 ENCOUNTER — EMERGENCY (EMERGENCY)
Facility: HOSPITAL | Age: 44
LOS: 1 days | Discharge: HOME | End: 2024-10-26
Attending: EMERGENCY MEDICINE | Admitting: EMERGENCY MEDICINE
Payer: COMMERCIAL

## 2024-10-26 VITALS
SYSTOLIC BLOOD PRESSURE: 139 MMHG | WEIGHT: 205.03 LBS | RESPIRATION RATE: 18 BRPM | DIASTOLIC BLOOD PRESSURE: 94 MMHG | TEMPERATURE: 98 F | OXYGEN SATURATION: 100 % | HEIGHT: 65 IN | HEART RATE: 80 BPM

## 2024-10-26 VITALS
RESPIRATION RATE: 18 BRPM | HEART RATE: 60 BPM | OXYGEN SATURATION: 97 % | DIASTOLIC BLOOD PRESSURE: 92 MMHG | TEMPERATURE: 98 F | SYSTOLIC BLOOD PRESSURE: 138 MMHG

## 2024-10-26 DIAGNOSIS — Z98.89 OTHER SPECIFIED POSTPROCEDURAL STATES: Chronic | ICD-10-CM

## 2024-10-26 DIAGNOSIS — Z98.891 HISTORY OF UTERINE SCAR FROM PREVIOUS SURGERY: Chronic | ICD-10-CM

## 2024-10-26 DIAGNOSIS — Z98.890 OTHER SPECIFIED POSTPROCEDURAL STATES: Chronic | ICD-10-CM

## 2024-10-26 DIAGNOSIS — Z98.84 BARIATRIC SURGERY STATUS: Chronic | ICD-10-CM

## 2024-10-26 LAB
ALBUMIN SERPL ELPH-MCNC: 4 G/DL — SIGNIFICANT CHANGE UP (ref 3.3–5)
ALP SERPL-CCNC: 59 U/L — SIGNIFICANT CHANGE UP (ref 40–120)
ALT FLD-CCNC: 11 U/L — SIGNIFICANT CHANGE UP (ref 10–45)
ANION GAP SERPL CALC-SCNC: 10 MMOL/L — SIGNIFICANT CHANGE UP (ref 5–17)
APPEARANCE UR: CLEAR — SIGNIFICANT CHANGE UP
AST SERPL-CCNC: 18 U/L — SIGNIFICANT CHANGE UP (ref 10–40)
BACTERIA # UR AUTO: ABNORMAL /HPF
BASOPHILS # BLD AUTO: 0.02 K/UL — SIGNIFICANT CHANGE UP (ref 0–0.2)
BASOPHILS NFR BLD AUTO: 0.5 % — SIGNIFICANT CHANGE UP (ref 0–2)
BILIRUB SERPL-MCNC: 0.3 MG/DL — SIGNIFICANT CHANGE UP (ref 0.2–1.2)
BILIRUB UR-MCNC: NEGATIVE — SIGNIFICANT CHANGE UP
BUN SERPL-MCNC: 15 MG/DL — SIGNIFICANT CHANGE UP (ref 7–23)
CALCIUM SERPL-MCNC: 9.9 MG/DL — SIGNIFICANT CHANGE UP (ref 8.4–10.5)
CAST: 1 /LPF — SIGNIFICANT CHANGE UP (ref 0–4)
CHLORIDE SERPL-SCNC: 104 MMOL/L — SIGNIFICANT CHANGE UP (ref 96–108)
CO2 SERPL-SCNC: 25 MMOL/L — SIGNIFICANT CHANGE UP (ref 22–31)
COLOR SPEC: YELLOW — SIGNIFICANT CHANGE UP
CREAT SERPL-MCNC: 1.15 MG/DL — SIGNIFICANT CHANGE UP (ref 0.5–1.3)
DIFF PNL FLD: NEGATIVE — SIGNIFICANT CHANGE UP
EGFR: 60 ML/MIN/1.73M2 — SIGNIFICANT CHANGE UP
EOSINOPHIL # BLD AUTO: 0.11 K/UL — SIGNIFICANT CHANGE UP (ref 0–0.5)
EOSINOPHIL NFR BLD AUTO: 2.9 % — SIGNIFICANT CHANGE UP (ref 0–6)
GLUCOSE SERPL-MCNC: 93 MG/DL — SIGNIFICANT CHANGE UP (ref 70–99)
GLUCOSE UR QL: NEGATIVE MG/DL — SIGNIFICANT CHANGE UP
HCT VFR BLD CALC: 39.6 % — SIGNIFICANT CHANGE UP (ref 34.5–45)
HGB BLD-MCNC: 12.9 G/DL — SIGNIFICANT CHANGE UP (ref 11.5–15.5)
IMM GRANULOCYTES NFR BLD AUTO: 0.3 % — SIGNIFICANT CHANGE UP (ref 0–0.9)
KETONES UR-MCNC: ABNORMAL MG/DL
LEUKOCYTE ESTERASE UR-ACNC: ABNORMAL
LIDOCAIN IGE QN: 42 U/L — SIGNIFICANT CHANGE UP (ref 7–60)
LYMPHOCYTES # BLD AUTO: 1.8 K/UL — SIGNIFICANT CHANGE UP (ref 1–3.3)
LYMPHOCYTES # BLD AUTO: 47 % — HIGH (ref 13–44)
MCHC RBC-ENTMCNC: 29 PG — SIGNIFICANT CHANGE UP (ref 27–34)
MCHC RBC-ENTMCNC: 32.6 GM/DL — SIGNIFICANT CHANGE UP (ref 32–36)
MCV RBC AUTO: 89 FL — SIGNIFICANT CHANGE UP (ref 80–100)
MONOCYTES # BLD AUTO: 0.29 K/UL — SIGNIFICANT CHANGE UP (ref 0–0.9)
MONOCYTES NFR BLD AUTO: 7.6 % — SIGNIFICANT CHANGE UP (ref 2–14)
NEUTROPHILS # BLD AUTO: 1.6 K/UL — LOW (ref 1.8–7.4)
NEUTROPHILS NFR BLD AUTO: 41.7 % — LOW (ref 43–77)
NITRITE UR-MCNC: NEGATIVE — SIGNIFICANT CHANGE UP
NRBC # BLD: 0 /100 WBCS — SIGNIFICANT CHANGE UP (ref 0–0)
PH UR: 6.5 — SIGNIFICANT CHANGE UP (ref 5–8)
PLATELET # BLD AUTO: 207 K/UL — SIGNIFICANT CHANGE UP (ref 150–400)
POTASSIUM SERPL-MCNC: 4.8 MMOL/L — SIGNIFICANT CHANGE UP (ref 3.5–5.3)
POTASSIUM SERPL-SCNC: 4.8 MMOL/L — SIGNIFICANT CHANGE UP (ref 3.5–5.3)
PROT SERPL-MCNC: 7.6 G/DL — SIGNIFICANT CHANGE UP (ref 6–8.3)
PROT UR-MCNC: NEGATIVE MG/DL — SIGNIFICANT CHANGE UP
RBC # BLD: 4.45 M/UL — SIGNIFICANT CHANGE UP (ref 3.8–5.2)
RBC # FLD: 13.1 % — SIGNIFICANT CHANGE UP (ref 10.3–14.5)
RBC CASTS # UR COMP ASSIST: 3 /HPF — SIGNIFICANT CHANGE UP (ref 0–4)
SODIUM SERPL-SCNC: 139 MMOL/L — SIGNIFICANT CHANGE UP (ref 135–145)
SP GR SPEC: 1.02 — SIGNIFICANT CHANGE UP (ref 1–1.03)
SQUAMOUS # UR AUTO: 9 /HPF — HIGH (ref 0–5)
UROBILINOGEN FLD QL: 1 MG/DL — SIGNIFICANT CHANGE UP (ref 0.2–1)
WBC # BLD: 3.83 K/UL — SIGNIFICANT CHANGE UP (ref 3.8–10.5)
WBC # FLD AUTO: 3.83 K/UL — SIGNIFICANT CHANGE UP (ref 3.8–10.5)
WBC UR QL: 3 /HPF — SIGNIFICANT CHANGE UP (ref 0–5)

## 2024-10-26 PROCEDURE — 99284 EMERGENCY DEPT VISIT MOD MDM: CPT | Mod: 25

## 2024-10-26 PROCEDURE — 80053 COMPREHEN METABOLIC PANEL: CPT

## 2024-10-26 PROCEDURE — 99284 EMERGENCY DEPT VISIT MOD MDM: CPT

## 2024-10-26 PROCEDURE — 74176 CT ABD & PELVIS W/O CONTRAST: CPT | Mod: 26,MC

## 2024-10-26 PROCEDURE — 96374 THER/PROPH/DIAG INJ IV PUSH: CPT

## 2024-10-26 PROCEDURE — 36415 COLL VENOUS BLD VENIPUNCTURE: CPT

## 2024-10-26 PROCEDURE — 81001 URINALYSIS AUTO W/SCOPE: CPT

## 2024-10-26 PROCEDURE — 76705 ECHO EXAM OF ABDOMEN: CPT | Mod: 26

## 2024-10-26 PROCEDURE — 85025 COMPLETE CBC W/AUTO DIFF WBC: CPT

## 2024-10-26 PROCEDURE — 76705 ECHO EXAM OF ABDOMEN: CPT

## 2024-10-26 PROCEDURE — 83690 ASSAY OF LIPASE: CPT

## 2024-10-26 PROCEDURE — 74176 CT ABD & PELVIS W/O CONTRAST: CPT | Mod: MC

## 2024-10-26 RX ORDER — KETOROLAC TROMETHAMINE 10 MG/1
15 TABLET, FILM COATED ORAL ONCE
Refills: 0 | Status: DISCONTINUED | OUTPATIENT
Start: 2024-10-26 | End: 2024-10-26

## 2024-10-26 RX ORDER — MORPHINE SULFATE 30 MG/1
4 TABLET, FILM COATED, EXTENDED RELEASE ORAL ONCE
Refills: 0 | Status: DISCONTINUED | OUTPATIENT
Start: 2024-10-26 | End: 2024-10-26

## 2024-10-26 RX ADMIN — MORPHINE SULFATE 4 MILLIGRAM(S): 30 TABLET, FILM COATED, EXTENDED RELEASE ORAL at 11:45

## 2024-10-26 NOTE — ED PROVIDER NOTE - OBJECTIVE STATEMENT
43yo F hx gastric sleeve, history of renal stones req lithotripsy and urethral stent placement in past, idiopathoc pancreatitis years ago, here w/ RUQ pain since Friday. Denies urinary symptoms. Gradual onset. No N/V/D. Saw her PCP who ordered abd US today but pain increased so came to the ED instead.

## 2024-10-26 NOTE — ED ADULT NURSE NOTE - NSFALLUNIVINTERV_ED_ALL_ED
Bed/Stretcher in lowest position, wheels locked, appropriate side rails in place/Call bell, personal items and telephone in reach/Instruct patient to call for assistance before getting out of bed/chair/stretcher/Non-slip footwear applied when patient is off stretcher/[place] to call system/Physically safe environment - no spills, clutter or unnecessary equipment/Purposeful proactive rounding/Room/bathroom lighting operational, light cord in reach

## 2024-10-26 NOTE — ED PROVIDER NOTE - PHYSICAL EXAMINATION
CONSTITUTIONAL: Well-appearing; well-nourished; in no apparent distress.   HEAD: Normocephalic; atraumatic.   EYES:  conjunctiva and sclera clear  ENT: normal nose; no rhinorrhea;  NECK: Supple; full ROM  RESPIRATORY: Breathing easily; no resp difficulty  GI: soft, +RUQ tenderness, +murphys sign. No RLQ tenderness, neg mcburneys, no CVA tenderness  EXT: No cyanosis or edema;  SKIN: Normal for age and race; warm; dry; good turgor; no apparent lesions or rash.   NEURO: A & O x 3; face symmetric; grossly unremarkable.   PSYCHOLOGICAL: The patient’s mood and manner are appropriate.

## 2024-10-26 NOTE — ED PROVIDER NOTE - PATIENT PORTAL LINK FT
You can access the FollowMyHealth Patient Portal offered by [facility] by registering at the following website: [email or web address] By joining [facility]’s FollowMyHealth portal, you will also be able to view your health information using other applications (apps) compatible with our system.

## 2024-11-07 NOTE — ED ADULT NURSE NOTE - CAS EDP DISCH TYPE
Spoke with patient rely message, pt is wanting to know if she has to tartrated down the medication, she also mention she would need a new rx for wellbutrin to nirmal guzman please advise    Home no

## 2024-11-12 ENCOUNTER — NON-APPOINTMENT (OUTPATIENT)
Age: 44
End: 2024-11-12

## 2024-11-22 ENCOUNTER — NON-APPOINTMENT (OUTPATIENT)
Age: 44
End: 2024-11-22

## 2024-11-22 ENCOUNTER — APPOINTMENT (OUTPATIENT)
Dept: BREAST CENTER | Facility: CLINIC | Age: 44
End: 2024-11-22
Payer: COMMERCIAL

## 2024-11-22 DIAGNOSIS — Z80.3 FAMILY HISTORY OF MALIGNANT NEOPLASM OF BREAST: ICD-10-CM

## 2024-11-22 DIAGNOSIS — R92.8 OTHER ABNORMAL AND INCONCLUSIVE FINDINGS ON DIAGNOSTIC IMAGING OF BREAST: ICD-10-CM

## 2024-11-22 PROCEDURE — 99204 OFFICE O/P NEW MOD 45 MIN: CPT

## 2024-11-25 ENCOUNTER — NON-APPOINTMENT (OUTPATIENT)
Age: 44
End: 2024-11-25

## 2024-11-26 ENCOUNTER — APPOINTMENT (OUTPATIENT)
Dept: RHEUMATOLOGY | Facility: CLINIC | Age: 44
End: 2024-11-26
Payer: COMMERCIAL

## 2024-11-26 ENCOUNTER — LABORATORY RESULT (OUTPATIENT)
Age: 44
End: 2024-11-26

## 2024-11-26 VITALS
SYSTOLIC BLOOD PRESSURE: 112 MMHG | BODY MASS INDEX: 33.99 KG/M2 | TEMPERATURE: 97.1 F | DIASTOLIC BLOOD PRESSURE: 76 MMHG | OXYGEN SATURATION: 100 % | WEIGHT: 204 LBS | HEIGHT: 65 IN | HEART RATE: 77 BPM

## 2024-11-26 DIAGNOSIS — M25.50 PAIN IN UNSPECIFIED JOINT: ICD-10-CM

## 2024-11-26 PROCEDURE — 99215 OFFICE O/P EST HI 40 MIN: CPT

## 2024-11-26 PROCEDURE — 36415 COLL VENOUS BLD VENIPUNCTURE: CPT

## 2024-11-26 PROCEDURE — G2211 COMPLEX E/M VISIT ADD ON: CPT | Mod: NC

## 2024-11-27 ENCOUNTER — TRANSCRIPTION ENCOUNTER (OUTPATIENT)
Age: 44
End: 2024-11-27

## 2024-11-27 LAB
25(OH)D3 SERPL-MCNC: 62.7 NG/ML
ALBUMIN SERPL ELPH-MCNC: 4.4 G/DL
ALP BLD-CCNC: 64 U/L
ALT SERPL-CCNC: 10 U/L
ANA SER IF-ACNC: NEGATIVE
ANION GAP SERPL CALC-SCNC: 14 MMOL/L
APPEARANCE: CLEAR
AST SERPL-CCNC: 16 U/L
BILIRUB SERPL-MCNC: 0.2 MG/DL
BILIRUBIN URINE: NEGATIVE
BLOOD URINE: NEGATIVE
BUN SERPL-MCNC: 21 MG/DL
C3 SERPL-MCNC: 130 MG/DL
C4 SERPL-MCNC: 29 MG/DL
CALCIUM SERPL-MCNC: 10.2 MG/DL
CENTROMERE IGG SER-ACNC: <0.2 AL
CHLORIDE SERPL-SCNC: 105 MMOL/L
CK SERPL-CCNC: 112 U/L
CO2 SERPL-SCNC: 27 MMOL/L
COLOR: YELLOW
CREAT SERPL-MCNC: 1.21 MG/DL
CRP SERPL-MCNC: 3 MG/L
DSDNA AB SER-ACNC: <1 IU/ML
EGFR: 56 ML/MIN/1.73M2
ENA RNP AB SER IA-ACNC: 1 AL
ENA SCL70 IGG SER IA-ACNC: <0.2 AL
ENA SM AB SER IA-ACNC: <0.2 AL
ENA SS-A AB SER IA-ACNC: <0.2 AL
ENA SS-B AB SER IA-ACNC: <0.2 AL
ERYTHROCYTE [SEDIMENTATION RATE] IN BLOOD BY WESTERGREN METHOD: 23 MM/HR
GLUCOSE QUALITATIVE U: NEGATIVE MG/DL
GLUCOSE SERPL-MCNC: 93 MG/DL
HCT VFR BLD CALC: 42.8 %
HGB BLD-MCNC: 13.2 G/DL
IGG SER QL IEP: 1600 MG/DL
IGG1 SER-MCNC: 824 MG/DL
IGG2 SER-MCNC: 561 MG/DL
IGG3 SER-MCNC: >218.7 MG/DL
IGG4 SER-MCNC: 50.5 MG/DL
KETONES URINE: NEGATIVE MG/DL
LEUKOCYTE ESTERASE URINE: NEGATIVE
MCHC RBC-ENTMCNC: 28.2 PG
MCHC RBC-ENTMCNC: 30.8 G/DL
MCV RBC AUTO: 91.5 FL
NITRITE URINE: NEGATIVE
PH URINE: 6.5
PLATELET # BLD AUTO: 265 K/UL
POTASSIUM SERPL-SCNC: 4.5 MMOL/L
PROT SERPL-MCNC: 7.7 G/DL
PROTEIN URINE: NEGATIVE MG/DL
RBC # BLD: 4.68 M/UL
RBC # FLD: 13.9 %
RHEUMATOID FACT SER QL: <10 IU/ML
SODIUM SERPL-SCNC: 146 MMOL/L
SPECIFIC GRAVITY URINE: 1.03
UROBILINOGEN URINE: 1 MG/DL
WBC # FLD AUTO: 3.94 K/UL

## 2024-11-28 LAB — ACE BLD-CCNC: 26 U/L

## 2024-11-29 LAB
ALBUMIN MFR SERPL ELPH: 56.7 %
ALBUMIN SERPL-MCNC: 4.5 G/DL
ALBUMIN/GLOB SERPL: 1.3 RATIO
ALPHA1 GLOB MFR SERPL ELPH: 3.4 %
ALPHA1 GLOB SERPL ELPH-MCNC: 0.3 G/DL
ALPHA2 GLOB MFR SERPL ELPH: 10.2 %
ALPHA2 GLOB SERPL ELPH-MCNC: 0.8 G/DL
B-GLOBULIN MFR SERPL ELPH: 10.2 %
B-GLOBULIN SERPL ELPH-MCNC: 0.8 G/DL
GAMMA GLOB FLD ELPH-MCNC: 1.5 G/DL
GAMMA GLOB MFR SERPL ELPH: 19.5 %
INTERPRETATION SERPL IEP-IMP: NORMAL
M PROTEIN SPEC IFE-MCNC: NORMAL
PROT SERPL-MCNC: 7.9 G/DL
PROT SERPL-MCNC: 7.9 G/DL

## 2024-12-01 LAB
M TB IFN-G BLD-IMP: NEGATIVE
QUANTIFERON TB PLUS MITOGEN MINUS NIL: >10 IU/ML
QUANTIFERON TB PLUS NIL: 0.05 IU/ML
QUANTIFERON TB PLUS TB1 MINUS NIL: 0.01 IU/ML
QUANTIFERON TB PLUS TB2 MINUS NIL: 0 IU/ML
RNA POLYMERASE III IGG: 13 UNITS

## 2024-12-02 ENCOUNTER — TRANSCRIPTION ENCOUNTER (OUTPATIENT)
Age: 44
End: 2024-12-02

## 2024-12-04 ENCOUNTER — RESULT REVIEW (OUTPATIENT)
Age: 44
End: 2024-12-04

## 2024-12-04 ENCOUNTER — OUTPATIENT (OUTPATIENT)
Dept: OUTPATIENT SERVICES | Facility: HOSPITAL | Age: 44
LOS: 1 days | End: 2024-12-04
Payer: COMMERCIAL

## 2024-12-04 DIAGNOSIS — Z98.891 HISTORY OF UTERINE SCAR FROM PREVIOUS SURGERY: Chronic | ICD-10-CM

## 2024-12-04 DIAGNOSIS — Z98.890 OTHER SPECIFIED POSTPROCEDURAL STATES: Chronic | ICD-10-CM

## 2024-12-04 DIAGNOSIS — I88.9 NONSPECIFIC LYMPHADENITIS, UNSPECIFIED: ICD-10-CM

## 2024-12-04 DIAGNOSIS — Z98.84 BARIATRIC SURGERY STATUS: Chronic | ICD-10-CM

## 2024-12-04 DIAGNOSIS — Z98.89 OTHER SPECIFIED POSTPROCEDURAL STATES: Chronic | ICD-10-CM

## 2024-12-04 PROCEDURE — 88321 CONSLTJ&REPRT SLD PREP ELSWR: CPT

## 2024-12-05 ENCOUNTER — TRANSCRIPTION ENCOUNTER (OUTPATIENT)
Age: 44
End: 2024-12-05

## 2024-12-05 RX ORDER — TIRZEPATIDE 2.5 MG/.5ML
2.5 INJECTION, SOLUTION SUBCUTANEOUS
Qty: 1 | Refills: 3 | Status: ACTIVE | COMMUNITY
Start: 2024-12-05 | End: 1900-01-01

## 2024-12-06 ENCOUNTER — APPOINTMENT (OUTPATIENT)
Dept: HEMATOLOGY ONCOLOGY | Facility: CLINIC | Age: 44
End: 2024-12-06

## 2024-12-10 ENCOUNTER — TRANSCRIPTION ENCOUNTER (OUTPATIENT)
Age: 44
End: 2024-12-10

## 2024-12-11 ENCOUNTER — TRANSCRIPTION ENCOUNTER (OUTPATIENT)
Age: 44
End: 2024-12-11

## 2024-12-12 ENCOUNTER — TRANSCRIPTION ENCOUNTER (OUTPATIENT)
Age: 44
End: 2024-12-12

## 2024-12-12 DIAGNOSIS — E66.812 OBESITY, CLASS 2: ICD-10-CM

## 2024-12-12 RX ORDER — PHENTERMINE HYDROCHLORIDE 15 MG/1
15 CAPSULE ORAL DAILY
Qty: 30 | Refills: 0 | Status: ACTIVE | COMMUNITY
Start: 2024-12-12 | End: 1900-01-01

## 2024-12-24 ENCOUNTER — APPOINTMENT (OUTPATIENT)
Dept: NEPHROLOGY | Facility: CLINIC | Age: 44
End: 2024-12-24

## 2025-01-07 ENCOUNTER — TRANSCRIPTION ENCOUNTER (OUTPATIENT)
Age: 45
End: 2025-01-07

## 2025-01-08 ENCOUNTER — APPOINTMENT (OUTPATIENT)
Dept: RHEUMATOLOGY | Facility: CLINIC | Age: 45
End: 2025-01-08

## 2025-01-08 PROCEDURE — G2211 COMPLEX E/M VISIT ADD ON: CPT | Mod: NC,95

## 2025-01-08 PROCEDURE — 99215 OFFICE O/P EST HI 40 MIN: CPT | Mod: 95

## 2025-01-10 ENCOUNTER — TRANSCRIPTION ENCOUNTER (OUTPATIENT)
Age: 45
End: 2025-01-10

## 2025-01-13 ENCOUNTER — TRANSCRIPTION ENCOUNTER (OUTPATIENT)
Age: 45
End: 2025-01-13

## 2025-01-21 ENCOUNTER — APPOINTMENT (OUTPATIENT)
Dept: ENDOCRINOLOGY | Facility: CLINIC | Age: 45
End: 2025-01-21
Payer: COMMERCIAL

## 2025-01-21 VITALS
DIASTOLIC BLOOD PRESSURE: 98 MMHG | SYSTOLIC BLOOD PRESSURE: 147 MMHG | HEART RATE: 86 BPM | BODY MASS INDEX: 35.11 KG/M2 | WEIGHT: 211 LBS

## 2025-01-21 DIAGNOSIS — E04.2 NONTOXIC MULTINODULAR GOITER: ICD-10-CM

## 2025-01-21 PROCEDURE — G2211 COMPLEX E/M VISIT ADD ON: CPT | Mod: NC

## 2025-01-21 PROCEDURE — 99214 OFFICE O/P EST MOD 30 MIN: CPT

## 2025-01-28 ENCOUNTER — LABORATORY RESULT (OUTPATIENT)
Age: 45
End: 2025-01-28

## 2025-01-28 ENCOUNTER — APPOINTMENT (OUTPATIENT)
Dept: NEPHROLOGY | Facility: CLINIC | Age: 45
End: 2025-01-28
Payer: COMMERCIAL

## 2025-01-28 VITALS — WEIGHT: 211 LBS | BODY MASS INDEX: 35.11 KG/M2

## 2025-01-28 VITALS — DIASTOLIC BLOOD PRESSURE: 80 MMHG | SYSTOLIC BLOOD PRESSURE: 110 MMHG

## 2025-01-28 DIAGNOSIS — M54.9 DORSALGIA, UNSPECIFIED: ICD-10-CM

## 2025-01-28 DIAGNOSIS — R80.9 PROTEINURIA, UNSPECIFIED: ICD-10-CM

## 2025-01-28 DIAGNOSIS — R34 ANURIA AND OLIGURIA: ICD-10-CM

## 2025-01-28 DIAGNOSIS — N20.0 CALCULUS OF KIDNEY: ICD-10-CM

## 2025-01-28 DIAGNOSIS — E66.812 OBESITY, CLASS 2: ICD-10-CM

## 2025-01-28 PROCEDURE — G2211 COMPLEX E/M VISIT ADD ON: CPT | Mod: NC

## 2025-01-28 PROCEDURE — 99214 OFFICE O/P EST MOD 30 MIN: CPT

## 2025-01-29 ENCOUNTER — NON-APPOINTMENT (OUTPATIENT)
Age: 45
End: 2025-01-29

## 2025-01-29 LAB
APPEARANCE: CLEAR
BILIRUBIN URINE: NEGATIVE
BLOOD URINE: NEGATIVE
COLOR: YELLOW
GLUCOSE QUALITATIVE U: NEGATIVE MG/DL
KETONES URINE: NEGATIVE MG/DL
LEUKOCYTE ESTERASE URINE: ABNORMAL
NITRITE URINE: NEGATIVE
PH URINE: 7
PROTEIN URINE: NORMAL MG/DL
SPECIFIC GRAVITY URINE: >1.03
UROBILINOGEN URINE: 1 MG/DL

## 2025-02-24 ENCOUNTER — NON-APPOINTMENT (OUTPATIENT)
Age: 45
End: 2025-02-24

## 2025-02-24 ENCOUNTER — TRANSCRIPTION ENCOUNTER (OUTPATIENT)
Age: 45
End: 2025-02-24

## 2025-03-24 ENCOUNTER — TRANSCRIPTION ENCOUNTER (OUTPATIENT)
Age: 45
End: 2025-03-24

## 2025-04-01 ENCOUNTER — TRANSCRIPTION ENCOUNTER (OUTPATIENT)
Age: 45
End: 2025-04-01

## 2025-04-01 ENCOUNTER — NON-APPOINTMENT (OUTPATIENT)
Age: 45
End: 2025-04-01

## 2025-04-07 ENCOUNTER — APPOINTMENT (OUTPATIENT)
Dept: ENDOCRINOLOGY | Facility: CLINIC | Age: 45
End: 2025-04-07
Payer: COMMERCIAL

## 2025-04-07 VITALS
DIASTOLIC BLOOD PRESSURE: 86 MMHG | WEIGHT: 224 LBS | BODY MASS INDEX: 37.28 KG/M2 | HEART RATE: 99 BPM | SYSTOLIC BLOOD PRESSURE: 138 MMHG

## 2025-04-07 DIAGNOSIS — E04.2 NONTOXIC MULTINODULAR GOITER: ICD-10-CM

## 2025-04-07 PROCEDURE — 99213 OFFICE O/P EST LOW 20 MIN: CPT

## 2025-04-07 RX ORDER — VALSARTAN 40 MG/1
40 TABLET, COATED ORAL
Refills: 0 | Status: ACTIVE | COMMUNITY
Start: 2025-04-07

## 2025-04-08 ENCOUNTER — TRANSCRIPTION ENCOUNTER (OUTPATIENT)
Age: 45
End: 2025-04-08

## 2025-04-09 ENCOUNTER — APPOINTMENT (OUTPATIENT)
Dept: BARIATRICS | Facility: CLINIC | Age: 45
End: 2025-04-09

## 2025-04-09 ENCOUNTER — NON-APPOINTMENT (OUTPATIENT)
Age: 45
End: 2025-04-09

## 2025-04-09 VITALS
HEART RATE: 94 BPM | SYSTOLIC BLOOD PRESSURE: 131 MMHG | WEIGHT: 226 LBS | HEIGHT: 65 IN | TEMPERATURE: 97.4 F | BODY MASS INDEX: 37.65 KG/M2 | DIASTOLIC BLOOD PRESSURE: 86 MMHG | OXYGEN SATURATION: 97 %

## 2025-04-09 DIAGNOSIS — E66.812 OBESITY, CLASS 2: ICD-10-CM

## 2025-04-09 PROCEDURE — 99204 OFFICE O/P NEW MOD 45 MIN: CPT

## 2025-04-14 ENCOUNTER — APPOINTMENT (OUTPATIENT)
Dept: BARIATRICS | Facility: CLINIC | Age: 45
End: 2025-04-14

## 2025-04-14 LAB
BASOPHILS # BLD AUTO: 0.03 K/UL
BASOPHILS NFR BLD AUTO: 0.7 %
EOSINOPHIL # BLD AUTO: 0.06 K/UL
EOSINOPHIL NFR BLD AUTO: 1.4 %
ESTIMATED AVERAGE GLUCOSE: 105 MG/DL
GLUCOSE BS SERPL-MCNC: 82 MG/DL
HBA1C MFR BLD HPLC: 5.3 %
HCT VFR BLD CALC: 39.9 %
HGB BLD-MCNC: 12.4 G/DL
IMM GRANULOCYTES NFR BLD AUTO: 0 %
INSULIN P FAST SERPL-ACNC: 9 UU/ML
LACTATE BLDA-MCNC: 2 MMOL/L
LYMPHOCYTES # BLD AUTO: 1.87 K/UL
LYMPHOCYTES NFR BLD AUTO: 42.7 %
MAN DIFF?: NORMAL
MCHC RBC-ENTMCNC: 27.8 PG
MCHC RBC-ENTMCNC: 31.1 G/DL
MCV RBC AUTO: 89.5 FL
MONOCYTES # BLD AUTO: 0.28 K/UL
MONOCYTES NFR BLD AUTO: 6.4 %
NEUTROPHILS # BLD AUTO: 2.14 K/UL
NEUTROPHILS NFR BLD AUTO: 48.8 %
PLATELET # BLD AUTO: 262 K/UL
RBC # BLD: 4.46 M/UL
RBC # FLD: 13.9 %
WBC # FLD AUTO: 4.38 K/UL

## 2025-04-15 LAB
25(OH)D3 SERPL-MCNC: 53.3 NG/ML
ALBUMIN SERPL ELPH-MCNC: 4.1 G/DL
ALP BLD-CCNC: 75 U/L
ALT SERPL-CCNC: 10 U/L
ANION GAP SERPL CALC-SCNC: 13 MMOL/L
APO B SERPL-MCNC: 96 MG/DL
AST SERPL-CCNC: 15 U/L
BILIRUB SERPL-MCNC: 0.2 MG/DL
BUN SERPL-MCNC: 14 MG/DL
CALCIUM SERPL-MCNC: 9.7 MG/DL
CHLORIDE SERPL-SCNC: 107 MMOL/L
CHOLEST SERPL-MCNC: 232 MG/DL
CO2 SERPL-SCNC: 25 MMOL/L
CREAT SERPL-MCNC: 0.81 MG/DL
CRP SERPL-MCNC: 3 MG/L
EGFRCR SERPLBLD CKD-EPI 2021: 91 ML/MIN/1.73M2
ESTRADIOL SERPL-MCNC: 297 PG/ML
FOLATE SERPL-MCNC: >20 NG/ML
GLUCOSE SERPL-MCNC: 96 MG/DL
HDLC SERPL-MCNC: 93 MG/DL
IRON SERPL-MCNC: 44 UG/DL
LDLC SERPL-MCNC: 128 MG/DL
NONHDLC SERPL-MCNC: 140 MG/DL
POTASSIUM SERPL-SCNC: 4.7 MMOL/L
PROGEST SERPL-MCNC: 0.6 NG/ML
PROLACTIN SERPL-MCNC: 20.2 NG/ML
PROT SERPL-MCNC: 7.1 G/DL
SHBG SERPL-SCNC: 66.4 NMOL/L
SODIUM SERPL-SCNC: 145 MMOL/L
TESTOST FREE SERPL-MCNC: 0.1 PG/ML
TESTOST SERPL-MCNC: 17.8 NG/DL
TRIGL SERPL-MCNC: 71 MG/DL
TSH SERPL-ACNC: 1.28 UIU/ML
URATE SERPL-MCNC: 3.3 MG/DL
VIT B12 SERPL-MCNC: 1133 PG/ML

## 2025-04-16 ENCOUNTER — TRANSCRIPTION ENCOUNTER (OUTPATIENT)
Age: 45
End: 2025-04-16

## 2025-04-16 ENCOUNTER — NON-APPOINTMENT (OUTPATIENT)
Age: 45
End: 2025-04-16

## 2025-04-16 LAB — APO LP(A) SERPL-MCNC: 76.6 NMOL/L

## 2025-04-17 ENCOUNTER — TRANSCRIPTION ENCOUNTER (OUTPATIENT)
Age: 45
End: 2025-04-17

## 2025-04-17 LAB
ESTROGEN SERPL-MCNC: 542 PG/ML
IGF BP1 SERPL-MCNC: 124 NG/ML
VIT B1 SERPL-MCNC: 67.6 NMOL/L

## 2025-04-19 LAB — VIT A SERPL-MCNC: 31.2 UG/DL

## 2025-04-22 ENCOUNTER — APPOINTMENT (OUTPATIENT)
Dept: BARIATRICS | Facility: CLINIC | Age: 45
End: 2025-04-22
Payer: COMMERCIAL

## 2025-04-22 ENCOUNTER — APPOINTMENT (OUTPATIENT)
Dept: PULMONOLOGY | Facility: CLINIC | Age: 45
End: 2025-04-22

## 2025-04-22 PROCEDURE — 97802 MEDICAL NUTRITION INDIV IN: CPT | Mod: 95

## 2025-04-23 ENCOUNTER — APPOINTMENT (OUTPATIENT)
Dept: BREAST CENTER | Facility: CLINIC | Age: 45
End: 2025-04-23

## 2025-05-20 ENCOUNTER — APPOINTMENT (OUTPATIENT)
Dept: BARIATRICS | Facility: CLINIC | Age: 45
End: 2025-05-20
Payer: COMMERCIAL

## 2025-05-20 VITALS — HEIGHT: 65 IN | BODY MASS INDEX: 37.15 KG/M2 | WEIGHT: 223 LBS

## 2025-05-20 PROCEDURE — 97803 MED NUTRITION INDIV SUBSEQ: CPT | Mod: 95

## 2025-05-22 ENCOUNTER — APPOINTMENT (OUTPATIENT)
Dept: RADIOLOGY | Facility: HOSPITAL | Age: 45
End: 2025-05-22
Payer: COMMERCIAL

## 2025-05-22 ENCOUNTER — OUTPATIENT (OUTPATIENT)
Dept: OUTPATIENT SERVICES | Facility: HOSPITAL | Age: 45
LOS: 1 days | End: 2025-05-22
Payer: COMMERCIAL

## 2025-05-22 DIAGNOSIS — Z98.84 BARIATRIC SURGERY STATUS: Chronic | ICD-10-CM

## 2025-05-22 DIAGNOSIS — Z98.890 OTHER SPECIFIED POSTPROCEDURAL STATES: Chronic | ICD-10-CM

## 2025-05-22 DIAGNOSIS — Z98.89 OTHER SPECIFIED POSTPROCEDURAL STATES: Chronic | ICD-10-CM

## 2025-05-22 DIAGNOSIS — Z98.891 HISTORY OF UTERINE SCAR FROM PREVIOUS SURGERY: Chronic | ICD-10-CM

## 2025-05-22 PROCEDURE — 74240 X-RAY XM UPR GI TRC 1CNTRST: CPT

## 2025-05-22 PROCEDURE — 74240 X-RAY XM UPR GI TRC 1CNTRST: CPT | Mod: 26

## 2025-05-27 ENCOUNTER — APPOINTMENT (OUTPATIENT)
Dept: NEPHROLOGY | Facility: CLINIC | Age: 45
End: 2025-05-27

## 2025-05-27 ENCOUNTER — APPOINTMENT (OUTPATIENT)
Dept: BARIATRICS | Facility: CLINIC | Age: 45
End: 2025-05-27

## 2025-05-27 ENCOUNTER — NON-APPOINTMENT (OUTPATIENT)
Age: 45
End: 2025-05-27

## 2025-05-28 ENCOUNTER — APPOINTMENT (OUTPATIENT)
Dept: BARIATRICS | Facility: CLINIC | Age: 45
End: 2025-05-28

## 2025-05-28 VITALS — WEIGHT: 223 LBS | BODY MASS INDEX: 37.15 KG/M2 | HEIGHT: 65 IN

## 2025-05-28 DIAGNOSIS — E66.812 OBESITY, CLASS 2: ICD-10-CM

## 2025-05-28 PROCEDURE — 99213 OFFICE O/P EST LOW 20 MIN: CPT | Mod: 95

## 2025-06-02 ENCOUNTER — LABORATORY RESULT (OUTPATIENT)
Age: 45
End: 2025-06-02

## 2025-06-03 ENCOUNTER — APPOINTMENT (OUTPATIENT)
Dept: BARIATRICS | Facility: CLINIC | Age: 45
End: 2025-06-03

## 2025-06-09 VITALS
SYSTOLIC BLOOD PRESSURE: 135 MMHG | RESPIRATION RATE: 16 BRPM | OXYGEN SATURATION: 100 % | HEART RATE: 72 BPM | HEIGHT: 65 IN | DIASTOLIC BLOOD PRESSURE: 83 MMHG | WEIGHT: 231.93 LBS | TEMPERATURE: 98 F

## 2025-06-10 ENCOUNTER — RESULT REVIEW (OUTPATIENT)
Age: 45
End: 2025-06-10

## 2025-06-10 ENCOUNTER — APPOINTMENT (OUTPATIENT)
Dept: BARIATRICS | Facility: HOSPITAL | Age: 45
End: 2025-06-10

## 2025-06-10 ENCOUNTER — INPATIENT (INPATIENT)
Facility: HOSPITAL | Age: 45
LOS: 1 days | Discharge: HOME | End: 2025-06-12
Attending: SURGERY | Admitting: SURGERY
Payer: COMMERCIAL

## 2025-06-10 DIAGNOSIS — Z98.84 BARIATRIC SURGERY STATUS: Chronic | ICD-10-CM

## 2025-06-10 DIAGNOSIS — Z98.890 OTHER SPECIFIED POSTPROCEDURAL STATES: Chronic | ICD-10-CM

## 2025-06-10 DIAGNOSIS — Z98.89 OTHER SPECIFIED POSTPROCEDURAL STATES: Chronic | ICD-10-CM

## 2025-06-10 DIAGNOSIS — Z98.891 HISTORY OF UTERINE SCAR FROM PREVIOUS SURGERY: Chronic | ICD-10-CM

## 2025-06-10 LAB
BLD GP AB SCN SERPL QL: NEGATIVE — SIGNIFICANT CHANGE UP
RH IG SCN BLD-IMP: POSITIVE — SIGNIFICANT CHANGE UP

## 2025-06-10 PROCEDURE — 43281 LAP PARAESOPHAG HERN REPAIR: CPT | Mod: 22

## 2025-06-10 PROCEDURE — 88307 TISSUE EXAM BY PATHOLOGIST: CPT | Mod: 26

## 2025-06-10 PROCEDURE — 43659 UNLISTED LAPS PX STOMACH: CPT | Mod: 22

## 2025-06-10 DEVICE — STAPLER ETHICON ECHELON ENDOPATH 60MM: Type: IMPLANTABLE DEVICE | Status: FUNCTIONAL

## 2025-06-10 DEVICE — STAPLER ETHICON GST ECHELON 60MM BLUE RELOAD: Type: IMPLANTABLE DEVICE | Status: FUNCTIONAL

## 2025-06-10 DEVICE — STAPLER ETHICON GST ECHELON 60MM GREEN RELOAD: Type: IMPLANTABLE DEVICE | Status: FUNCTIONAL

## 2025-06-10 DEVICE — STAPLER ECHELON FLEX POWERED P 3000 60MM LNG: Type: IMPLANTABLE DEVICE | Status: FUNCTIONAL

## 2025-06-10 DEVICE — SURGIFLO HEMOSTATIC MATRIX KIT: Type: IMPLANTABLE DEVICE | Status: FUNCTIONAL

## 2025-06-10 RX ORDER — ALBUTEROL SULFATE 2.5 MG/3ML
3 VIAL, NEBULIZER (ML) INHALATION
Refills: 0 | DISCHARGE

## 2025-06-10 RX ORDER — SODIUM CHLORIDE 9 G/1000ML
1000 INJECTION, SOLUTION INTRAVENOUS
Refills: 0 | Status: DISCONTINUED | OUTPATIENT
Start: 2025-06-10 | End: 2025-06-12

## 2025-06-10 RX ORDER — HYDROMORPHONE/SOD CHLOR,ISO/PF 2 MG/10 ML
0.5 SYRINGE (ML) INJECTION
Refills: 0 | Status: DISCONTINUED | OUTPATIENT
Start: 2025-06-10 | End: 2025-06-11

## 2025-06-10 RX ORDER — DIPHENHYDRAMINE HCL 12.5MG/5ML
25 ELIXIR ORAL ONCE
Refills: 0 | Status: COMPLETED | OUTPATIENT
Start: 2025-06-10 | End: 2025-06-10

## 2025-06-10 RX ORDER — ENOXAPARIN SODIUM 100 MG/ML
30 INJECTION SUBCUTANEOUS ONCE
Refills: 0 | Status: COMPLETED | OUTPATIENT
Start: 2025-06-10 | End: 2025-06-10

## 2025-06-10 RX ORDER — SCOPOLAMINE 1 MG/3D
1 PATCH, EXTENDED RELEASE TRANSDERMAL ONCE
Refills: 0 | Status: COMPLETED | OUTPATIENT
Start: 2025-06-10 | End: 2025-06-10

## 2025-06-10 RX ORDER — TRANEXAMIC ACID 1000 MG/10
1000 AMPUL (ML) INTRAVENOUS ONCE
Refills: 0 | Status: COMPLETED | OUTPATIENT
Start: 2025-06-11 | End: 2025-06-11

## 2025-06-10 RX ORDER — ACETAMINOPHEN 500 MG/5ML
1000 LIQUID (ML) ORAL ONCE
Refills: 0 | Status: COMPLETED | OUTPATIENT
Start: 2025-06-10 | End: 2025-06-10

## 2025-06-10 RX ORDER — ONDANSETRON HCL/PF 4 MG/2 ML
4 VIAL (ML) INJECTION EVERY 6 HOURS
Refills: 0 | Status: DISCONTINUED | OUTPATIENT
Start: 2025-06-10 | End: 2025-06-12

## 2025-06-10 RX ORDER — ACETAMINOPHEN 500 MG/5ML
1000 LIQUID (ML) ORAL EVERY 6 HOURS
Refills: 0 | Status: DISCONTINUED | OUTPATIENT
Start: 2025-06-10 | End: 2025-06-12

## 2025-06-10 RX ADMIN — Medication 0.5 MILLIGRAM(S): at 21:15

## 2025-06-10 RX ADMIN — Medication 25 MILLIGRAM(S): at 22:38

## 2025-06-10 RX ADMIN — ENOXAPARIN SODIUM 30 MILLIGRAM(S): 100 INJECTION SUBCUTANEOUS at 13:20

## 2025-06-10 RX ADMIN — Medication 40 MILLIGRAM(S): at 19:02

## 2025-06-10 RX ADMIN — Medication 0.5 MILLIGRAM(S): at 21:30

## 2025-06-10 RX ADMIN — Medication 1000 MILLIGRAM(S): at 13:21

## 2025-06-10 RX ADMIN — Medication 1000 MILLIGRAM(S): at 21:00

## 2025-06-10 RX ADMIN — SCOPOLAMINE 1 PATCH: 1 PATCH, EXTENDED RELEASE TRANSDERMAL at 13:20

## 2025-06-10 NOTE — H&P ADULT - ASSESSMENT
44 y old F with PMH of Obesity s/p gastric band in 2011 Gastric Sleeve surgery in 2014 then tx Mounjaro 2023 lost 70 lbs stopped 7/24 due to insurance , followed by Endocrinology for Thyroid nodules , Asthma, Seasonal allergies, GERD/Gastritis, Anemia, Migraine headaches, Renal stones -with microscopic hematuria evaluated by Urology , most recently with R breast mass evaluated by Breast Surgery s/p R breast mass biopsy showed 10/30/24 : portions of lymph node with necrotizing lymphadenitis. Now here for weight gain after bariatric surgery for sleeve to DOM-S.     PLAN  Plan to proceed to OR

## 2025-06-10 NOTE — PRE-ANESTHESIA EVALUATION ADULT - NSANTHGENDERRD_ENT_A_CORE
Pt noted to have significant PVC burden, which was the likely cause of his symptoms.  BP Cuff likely incorrectly assess HR to be lower due to the PVCs.  Device interrogated multiple times including EP staff, and it is functioning properly.   Lower limit HR increased to 70 bpm, which significantly reduced PVC burden, which likely has a trigerred mechanism (60bpm  Appears to be a possible trigger)  Recommend increasing metoprolol succinate to 100 mg po bid  Reduce Valsartan to 80 mg po daily, as it may be contributing to light headedness  If further BP control is needed, please consider norvasc or nifedipine.  EP will follow up in clinic and likely reduce rate back to 60 bpm with hope of reduced PVC burden on higher dose of BB.   No

## 2025-06-10 NOTE — BRIEF OPERATIVE NOTE - OPERATION/FINDINGS
Significant adhesions in the gastrohepatic ligament taken down using ligasure and hiatal hernia with significant scarring and adhesions at the crura, gastric fundus remanent noticed and resected. Primary hiatal hernia with quill. Small bowel ran from ICV and single anastomosis duodenal switch completed at ~300cm.

## 2025-06-10 NOTE — BRIEF OPERATIVE NOTE - NSICDXBRIEFPROCEDURE_GEN_ALL_CORE_FT
PROCEDURES:  Single anastomosis duodenal switch 10-Nabil-2025 19:00:17  Juan Soto  Herniorrhaphy, hiatal, laparoscopic 10-Nabil-2025 19:00:53  Juan Soto

## 2025-06-10 NOTE — BRIEF OPERATIVE NOTE - NSICDXBRIEFPOSTOP_GEN_ALL_CORE_FT
POST-OP DIAGNOSIS:  Hiatal hernia 10-Nabil-2025 19:01:45  Juan Soto  Severe obesity (BMI 35.0-39.9) with comorbidity 10-Nbail-2025 19:02:20  Juan Soto  Recent weight gain after surgical therapy for obesity 10-Nabil-2025 19:02:31  Juan Soto

## 2025-06-10 NOTE — PROGRESS NOTE ADULT - SUBJECTIVE AND OBJECTIVE BOX
Procedure: DOM-S and hiatal hernia repair and excision of remanant fundus   Surgeon: Dr. Reed    S: Pt seen and examined at bedside. Patient is lying comfortably in bed with no complaints. Tolerating diet, pain well controlled with current regimen. Patient denies fever, nausea, vomiting, chest pain, and shortness of breath. Patient is not yet passing gas or having bowel movements. Patient has not voided yet.     O:  T(C): 36.3 (06-10-25 @ 21:15), Max: 36.6 (06-10-25 @ 18:09)  T(F): 97.3 (06-10-25 @ 21:15), Max: 97.8 (06-10-25 @ 18:09)  HR: 67 (06-10-25 @ 21:15) (64 - 76)  BP: 123/87 (06-10-25 @ 21:15) (110/69 - 134/85)  RR: 16 (06-10-25 @ 21:15) (12 - 20)  SpO2: 94% (06-10-25 @ 21:15) (94% - 100%)  Wt(kg): --            Physical Exam  General: Patient is doing well and lying in bed comfortably  Constitutional: alert and oriented   Pulm: Nonlabored breathing, no respiratory distress  CV: Regular rate and rhythm, normal sinus rhythm  Abd:  soft, nontender, mildly distended. No rebound, no guarding.             Incisions: clean, dry, intact, no erythema/induration/edema  Extremities: warm, well perfused, no edema  Drains:

## 2025-06-10 NOTE — BRIEF OPERATIVE NOTE - NSICDXBRIEFPREOP_GEN_ALL_CORE_FT
PRE-OP DIAGNOSIS:  GERD (gastroesophageal reflux disease) 10-Nabil-2025 19:00:59  Juan Soto  Recent weight gain after surgical therapy for obesity 10-Nabli-2025 19:01:27  Juan Soto  Severe obesity (BMI 35.0-39.9) with comorbidity 10-Nabil-2025 19:01:37  Juan Soto

## 2025-06-10 NOTE — H&P ADULT - HISTORY OF PRESENT ILLNESS
44 y old F with PMH of Obesity s/p Gastric Sleeve surgery then tx Mopawel  lost 70 lbs stopped  due to insurance , followed by Endocrinology for Thyroid nodules , Asthma, Seasonal allergies, GERD/Gastritis, Anemia, Migraine headaches, Renal stones -with microscopic hematuria evaluated by Urology , most recently with R breast mass evaluated by Breast Surgery s/p R breast mass biopsy showed 10/30/24 : portions of lymph node with necrotizing lymphadenitis, flow cytometry did not show changes suggestive of lymphoproliferative disorder referred to Rheumatology for further evaluation Patient was evaluated for Joint pain and due to family history sister has NMO and Kikuchi disease evaluated by Rheumatology DR Wilfrid Hernandez  all serologies negative that time and X Rays did not show erosive disease , lost follow up after that and now here for lap sleeve to DS    PMHx: HTN; Asthma; Known thyroid nodules; hx of kidney stones; obesity BMI >35   PSx: S/p laparoscopic banding in  and gastric sleeve in ;    Meds: Albuterol Sulfate 0.63 MG/3ML q4; Multivitamins CAPSNurtec 75 MG qd pro; Valsartan 40 MG po qd   SHx: former smoker; no ETOH   FHx: sister has NMO and Kikuchi disease       General: AAOx3, NAD, laying comfortably in bed  Cardio: S1,S2, normotensive; normal HR   Pulm: Nonlabored breathing  Abdomen: soft; non-distended; non-tender  Extremities: WWP, peripheral pulses appreciated

## 2025-06-10 NOTE — PRE-ANESTHESIA EVALUATION ADULT - NSANTHTOBACCOSD_GEN_ALL_CORE
Assisted pt on bedpan at this time. NADN; bed lowest position and locked. Call bell within reach   No

## 2025-06-11 LAB
ANION GAP SERPL CALC-SCNC: 11 MMOL/L — SIGNIFICANT CHANGE UP (ref 5–17)
BUN SERPL-MCNC: 12 MG/DL — SIGNIFICANT CHANGE UP (ref 7–23)
CALCIUM SERPL-MCNC: 9.1 MG/DL — SIGNIFICANT CHANGE UP (ref 8.4–10.5)
CHLORIDE SERPL-SCNC: 105 MMOL/L — SIGNIFICANT CHANGE UP (ref 96–108)
CO2 SERPL-SCNC: 21 MMOL/L — LOW (ref 22–31)
CREAT SERPL-MCNC: 0.87 MG/DL — SIGNIFICANT CHANGE UP (ref 0.5–1.3)
EGFR: 83 ML/MIN/1.73M2 — SIGNIFICANT CHANGE UP
EGFR: 83 ML/MIN/1.73M2 — SIGNIFICANT CHANGE UP
GLUCOSE SERPL-MCNC: 111 MG/DL — HIGH (ref 70–99)
HCT VFR BLD CALC: 36.7 % — SIGNIFICANT CHANGE UP (ref 34.5–45)
HCT VFR BLD CALC: 39.4 % — SIGNIFICANT CHANGE UP (ref 34.5–45)
HGB BLD-MCNC: 11.9 G/DL — SIGNIFICANT CHANGE UP (ref 11.5–15.5)
HGB BLD-MCNC: 12.8 G/DL — SIGNIFICANT CHANGE UP (ref 11.5–15.5)
MAGNESIUM SERPL-MCNC: 1.8 MG/DL — SIGNIFICANT CHANGE UP (ref 1.6–2.6)
MCHC RBC-ENTMCNC: 27.9 PG — SIGNIFICANT CHANGE UP (ref 27–34)
MCHC RBC-ENTMCNC: 28.4 PG — SIGNIFICANT CHANGE UP (ref 27–34)
MCHC RBC-ENTMCNC: 32.4 G/DL — SIGNIFICANT CHANGE UP (ref 32–36)
MCHC RBC-ENTMCNC: 32.5 G/DL — SIGNIFICANT CHANGE UP (ref 32–36)
MCV RBC AUTO: 85.8 FL — SIGNIFICANT CHANGE UP (ref 80–100)
MCV RBC AUTO: 87.6 FL — SIGNIFICANT CHANGE UP (ref 80–100)
NRBC BLD AUTO-RTO: 0 /100 WBCS — SIGNIFICANT CHANGE UP (ref 0–0)
NRBC BLD AUTO-RTO: 0 /100 WBCS — SIGNIFICANT CHANGE UP (ref 0–0)
PHOSPHATE SERPL-MCNC: 3.8 MG/DL — SIGNIFICANT CHANGE UP (ref 2.5–4.5)
PLATELET # BLD AUTO: 153 K/UL — SIGNIFICANT CHANGE UP (ref 150–400)
PLATELET # BLD AUTO: 212 K/UL — SIGNIFICANT CHANGE UP (ref 150–400)
POTASSIUM SERPL-MCNC: 4.9 MMOL/L — SIGNIFICANT CHANGE UP (ref 3.5–5.3)
POTASSIUM SERPL-SCNC: 4.9 MMOL/L — SIGNIFICANT CHANGE UP (ref 3.5–5.3)
RBC # BLD: 4.19 M/UL — SIGNIFICANT CHANGE UP (ref 3.8–5.2)
RBC # BLD: 4.59 M/UL — SIGNIFICANT CHANGE UP (ref 3.8–5.2)
RBC # FLD: 14.2 % — SIGNIFICANT CHANGE UP (ref 10.3–14.5)
RBC # FLD: 14.2 % — SIGNIFICANT CHANGE UP (ref 10.3–14.5)
SODIUM SERPL-SCNC: 137 MMOL/L — SIGNIFICANT CHANGE UP (ref 135–145)
WBC # BLD: 10.62 K/UL — HIGH (ref 3.8–10.5)
WBC # BLD: 8.88 K/UL — SIGNIFICANT CHANGE UP (ref 3.8–10.5)
WBC # FLD AUTO: 10.62 K/UL — HIGH (ref 3.8–10.5)
WBC # FLD AUTO: 8.88 K/UL — SIGNIFICANT CHANGE UP (ref 3.8–10.5)

## 2025-06-11 RX ORDER — SIMETHICONE 80 MG
80 TABLET,CHEWABLE ORAL ONCE
Refills: 0 | Status: COMPLETED | OUTPATIENT
Start: 2025-06-11 | End: 2025-06-11

## 2025-06-11 RX ORDER — ACETAMINOPHEN 500 MG/5ML
1000 LIQUID (ML) ORAL ONCE
Refills: 0 | Status: COMPLETED | OUTPATIENT
Start: 2025-06-11 | End: 2025-06-11

## 2025-06-11 RX ORDER — MAGNESIUM SULFATE 500 MG/ML
2 SYRINGE (ML) INJECTION ONCE
Refills: 0 | Status: COMPLETED | OUTPATIENT
Start: 2025-06-11 | End: 2025-06-11

## 2025-06-11 RX ORDER — ALBUTEROL SULFATE 2.5 MG/3ML
1 VIAL, NEBULIZER (ML) INHALATION EVERY 4 HOURS
Refills: 0 | Status: DISCONTINUED | OUTPATIENT
Start: 2025-06-11 | End: 2025-06-12

## 2025-06-11 RX ORDER — KETOROLAC TROMETHAMINE 30 MG/ML
15 INJECTION, SOLUTION INTRAMUSCULAR; INTRAVENOUS EVERY 6 HOURS
Refills: 0 | Status: DISCONTINUED | OUTPATIENT
Start: 2025-06-11 | End: 2025-06-12

## 2025-06-11 RX ADMIN — Medication 25 GRAM(S): at 12:05

## 2025-06-11 RX ADMIN — KETOROLAC TROMETHAMINE 15 MILLIGRAM(S): 30 INJECTION, SOLUTION INTRAMUSCULAR; INTRAVENOUS at 23:29

## 2025-06-11 RX ADMIN — SODIUM CHLORIDE 75 MILLILITER(S): 9 INJECTION, SOLUTION INTRAVENOUS at 12:05

## 2025-06-11 RX ADMIN — Medication 1000 MILLIGRAM(S): at 15:05

## 2025-06-11 RX ADMIN — KETOROLAC TROMETHAMINE 15 MILLIGRAM(S): 30 INJECTION, SOLUTION INTRAMUSCULAR; INTRAVENOUS at 17:48

## 2025-06-11 RX ADMIN — KETOROLAC TROMETHAMINE 15 MILLIGRAM(S): 30 INJECTION, SOLUTION INTRAMUSCULAR; INTRAVENOUS at 06:29

## 2025-06-11 RX ADMIN — Medication 4 MILLIGRAM(S): at 03:07

## 2025-06-11 RX ADMIN — Medication 220 MILLIGRAM(S): at 02:38

## 2025-06-11 RX ADMIN — Medication 80 MILLIGRAM(S): at 02:59

## 2025-06-11 RX ADMIN — KETOROLAC TROMETHAMINE 15 MILLIGRAM(S): 30 INJECTION, SOLUTION INTRAMUSCULAR; INTRAVENOUS at 12:05

## 2025-06-11 RX ADMIN — Medication 1000 MILLIGRAM(S): at 09:02

## 2025-06-11 RX ADMIN — SCOPOLAMINE 1 PATCH: 1 PATCH, EXTENDED RELEASE TRANSDERMAL at 07:02

## 2025-06-11 RX ADMIN — Medication 40 MILLIGRAM(S): at 12:05

## 2025-06-11 RX ADMIN — Medication 400 MILLIGRAM(S): at 01:30

## 2025-06-11 RX ADMIN — KETOROLAC TROMETHAMINE 15 MILLIGRAM(S): 30 INJECTION, SOLUTION INTRAMUSCULAR; INTRAVENOUS at 23:59

## 2025-06-11 NOTE — DIETITIAN INITIAL EVALUATION ADULT - PERTINENT LABORATORY DATA
06-11    137  |  105  |  12  ----------------------------<  111[H]  4.9   |  21[L]  |  0.87    Ca    9.1      11 Jun 2025 06:44  Phos  3.8     06-11  Mg     1.8     06-11

## 2025-06-11 NOTE — DIETITIAN INITIAL EVALUATION ADULT - OTHER CALCULATIONS
Above energy needs calculated for wt maintenance (20-25kcal/kg ideal body weight).  Weeks 1-2 estimated needs: 568-682 kcal/day (10-12kcal/kg ideal body weight), 85-119g protein/day (1.5-2.1g/kg ideal body weight), >/=64oz clear fluids.

## 2025-06-11 NOTE — DIETITIAN INITIAL EVALUATION ADULT - PERTINENT MEDS FT
MEDICATIONS  (STANDING):  acetaminophen   Oral Liquid .. 1000 milliGRAM(s) Oral every 6 hours  ketorolac   Injectable 15 milliGRAM(s) IV Push every 6 hours  lactated ringers. 1000 milliLiter(s) (75 mL/Hr) IV Continuous <Continuous>  pantoprazole  Injectable 40 milliGRAM(s) IV Push daily    MEDICATIONS  (PRN):  ondansetron Injectable 4 milliGRAM(s) IV Push every 6 hours PRN Nausea

## 2025-06-11 NOTE — PROGRESS NOTE ADULT - SUBJECTIVE AND OBJECTIVE BOX
SUBJECTIVE:      MEDICATIONS  (STANDING):  acetaminophen   Oral Liquid .. 1000 milliGRAM(s) Oral every 6 hours  ketorolac   Injectable 15 milliGRAM(s) IV Push every 6 hours  lactated ringers. 1000 milliLiter(s) (75 mL/Hr) IV Continuous <Continuous>  pantoprazole  Injectable 40 milliGRAM(s) IV Push daily    MEDICATIONS  (PRN):  ondansetron Injectable 4 milliGRAM(s) IV Push every 6 hours PRN Nausea      Vital Signs Last 24 Hrs  T(C): 37.2 (11 Jun 2025 05:34), Max: 37.2 (11 Jun 2025 05:34)  T(F): 99 (11 Jun 2025 05:34), Max: 99 (11 Jun 2025 05:34)  HR: 71 (11 Jun 2025 05:34) (64 - 76)  BP: 133/86 (11 Jun 2025 05:34) (110/69 - 134/85)  BP(mean): 101 (11 Jun 2025 05:34) (84 - 104)  RR: 18 (11 Jun 2025 05:34) (12 - 20)  SpO2: 95% (11 Jun 2025 05:34) (94% - 100%)    Parameters below as of 11 Jun 2025 05:34  Patient On (Oxygen Delivery Method): room air        PHYSICAL EXAM  General: NAD, resting comfortably  Pulmonary: normal resp effort  Cardiovascular: NSR  Abdominal: soft, ND/NT, no organomegaly  Extremities: WWP, no clubbing/cyanosis/edema  Neuro: A/O x 3, no focal deficits    I&O's Detail    10 Nabil 2025 07:01  -  11 Jun 2025 06:18  --------------------------------------------------------  IN:    Lactated Ringers: 1500 mL  Total IN: 1500 mL    OUT:    Voided (mL): 700 mL  Total OUT: 700 mL    Total NET: 800 mL          LABS:                        12.8   10.62 )-----------( 153      ( 10 Nabil 2025 23:50 )             39.4                 RADIOLOGY & ADDITIONAL STUDIES: SUBJECTIVE: Patient examined bedside with Dr. Reed. Patient observed resting comfortably and not in distress. Patient complaining of incisional pain that is alleviated with IV Tylenol and Toradol. Patient complaining of nausea and spit. Patient reports sh  is not tolerating bariatric clear liquid diet. Patient reports she/he is ambulating and using incentive spirometer. Patient denies SOB and chest pain. Patient making adequate urine output.       MEDICATIONS  (STANDING):  acetaminophen   Oral Liquid .. 1000 milliGRAM(s) Oral every 6 hours  ketorolac   Injectable 15 milliGRAM(s) IV Push every 6 hours  lactated ringers. 1000 milliLiter(s) (75 mL/Hr) IV Continuous <Continuous>  pantoprazole  Injectable 40 milliGRAM(s) IV Push daily    MEDICATIONS  (PRN):  ondansetron Injectable 4 milliGRAM(s) IV Push every 6 hours PRN Nausea      Vital Signs Last 24 Hrs  T(C): 37.2 (11 Jun 2025 05:34), Max: 37.2 (11 Jun 2025 05:34)  T(F): 99 (11 Jun 2025 05:34), Max: 99 (11 Jun 2025 05:34)  HR: 71 (11 Jun 2025 05:34) (64 - 76)  BP: 133/86 (11 Jun 2025 05:34) (110/69 - 134/85)  BP(mean): 101 (11 Jun 2025 05:34) (84 - 104)  RR: 18 (11 Jun 2025 05:34) (12 - 20)  SpO2: 95% (11 Jun 2025 05:34) (94% - 100%)    Parameters below as of 11 Jun 2025 05:34  Patient On (Oxygen Delivery Method): room air        PHYSICAL EXAM  General: NAD, resting comfortably  Pulmonary: normal resp effort  Cardiovascular: NSR  Abdominal: soft, ND/NT, no organomegaly  Extremities: WWP, no clubbing/cyanosis/edema  Neuro: A/O x 3, no focal deficits    I&O's Detail    10 Nabil 2025 07:01  -  11 Jun 2025 06:18  --------------------------------------------------------  IN:    Lactated Ringers: 1500 mL  Total IN: 1500 mL    OUT:    Voided (mL): 700 mL  Total OUT: 700 mL    Total NET: 800 mL          LABS:                        12.8   10.62 )-----------( 153      ( 10 Nabil 2025 23:50 )             39.4

## 2025-06-11 NOTE — DIETITIAN INITIAL EVALUATION ADULT - PERSON TAUGHT/METHOD
RD Discussed volumes of various cup sizes on tray table and encouraged aiming for 4 oz/hr as tolerated. Pt is prepared with protein shakes, with plan to get vitamins after discharge. RD provided indepth education on diet advancement and specific nutrient needs status post single anastomosis duodenal-ileal switch surgery. Pt appeared receptive, verbalized understanding. Written nutrition education handouts provided./verbal instruction/written material/patient instructed

## 2025-06-11 NOTE — DIETITIAN INITIAL EVALUATION ADULT - OTHER INFO
This is a 44 year old female with a past medical history significant for Obesity status post gastric band in 2011 Gastric Sleeve surgery in 2014 then treatment Mounjaro 2023 lost 70 lbs stopped 7/24 due to insurance , followed by Endocrinology for Thyroid nodules , Asthma, Seasonal allergies, GERD/Gastritis, Anemia, Migraine headaches, Renal stones -with microscopic hematuria evaluated by Urology , most recently with R breast mass evaluated by Breast Surgery s/p R breast mass biopsy showed 10/30/24 : portions of lymph node with necrotizing lymphadenitis. Now here for weight gain after bariatric surgery for sleeve to DOM-S, hiatal hernia repair and excision of remanant fundus.     Pt seen on 9 at bedside. On assessment, pt sitting up in bed. Currently on Bariatric Clear Liquids (BARICLLIQ) diet, tolerating some PO. Pt had sips of water out of the clear, 30cc cups. Pain and nausea somewhat controlled, pt had more pain and nausea earlier/overnight, this morning it has decreased; nursing is aware. Discussed volumes of various cup sizes on tray table and encouraged aiming for 4 oz/hr as tolerated. Prepared with protein shakes, with plan to get vitamins after discharge. RD provided in-depth education on diet advancement and specific nutrient needs status-post conversion of sleeve to DOM-S (single anastomosis duodenal-ileal switch) surgery. No known food allergies. No dietary restrictions at home. Skin: surgical incisions. GI: within normal limits at this time per flowsheet. Labs reviewed: no major abnormal nutrition-related labs at this time; will monitor trends. Pt's wt on admission was ~232 pounds, pt's ideal body weight is ~125 pounds, pt is ~186% of ideal body weight; ideal body weight to be utilized for nutrient calculations. RD observed pt with no overt signs of muscle or fat wasting. Based on ASPEN guidelines, pt does not meet criteria for malnutrition at this time. RD to continue to follow up.

## 2025-06-12 ENCOUNTER — TRANSCRIPTION ENCOUNTER (OUTPATIENT)
Age: 45
End: 2025-06-12

## 2025-06-12 VITALS
RESPIRATION RATE: 17 BRPM | TEMPERATURE: 100 F | OXYGEN SATURATION: 97 % | HEART RATE: 69 BPM | SYSTOLIC BLOOD PRESSURE: 123 MMHG | DIASTOLIC BLOOD PRESSURE: 84 MMHG

## 2025-06-12 LAB
ANION GAP SERPL CALC-SCNC: 8 MMOL/L — SIGNIFICANT CHANGE UP (ref 5–17)
BUN SERPL-MCNC: 9 MG/DL — SIGNIFICANT CHANGE UP (ref 7–23)
CALCIUM SERPL-MCNC: 8.3 MG/DL — LOW (ref 8.4–10.5)
CHLORIDE SERPL-SCNC: 104 MMOL/L — SIGNIFICANT CHANGE UP (ref 96–108)
CO2 SERPL-SCNC: 22 MMOL/L — SIGNIFICANT CHANGE UP (ref 22–31)
CREAT SERPL-MCNC: 0.74 MG/DL — SIGNIFICANT CHANGE UP (ref 0.5–1.3)
EGFR: 101 ML/MIN/1.73M2 — SIGNIFICANT CHANGE UP
EGFR: 101 ML/MIN/1.73M2 — SIGNIFICANT CHANGE UP
GLUCOSE SERPL-MCNC: 90 MG/DL — SIGNIFICANT CHANGE UP (ref 70–99)
HCT VFR BLD CALC: 34.3 % — LOW (ref 34.5–45)
HGB BLD-MCNC: 11.1 G/DL — LOW (ref 11.5–15.5)
MAGNESIUM SERPL-MCNC: 2.1 MG/DL — SIGNIFICANT CHANGE UP (ref 1.6–2.6)
MCHC RBC-ENTMCNC: 28.4 PG — SIGNIFICANT CHANGE UP (ref 27–34)
MCHC RBC-ENTMCNC: 32.4 G/DL — SIGNIFICANT CHANGE UP (ref 32–36)
MCV RBC AUTO: 87.7 FL — SIGNIFICANT CHANGE UP (ref 80–100)
NRBC BLD AUTO-RTO: 0 /100 WBCS — SIGNIFICANT CHANGE UP (ref 0–0)
PHOSPHATE SERPL-MCNC: 1.9 MG/DL — LOW (ref 2.5–4.5)
PLATELET # BLD AUTO: 233 K/UL — SIGNIFICANT CHANGE UP (ref 150–400)
POTASSIUM SERPL-MCNC: 4 MMOL/L — SIGNIFICANT CHANGE UP (ref 3.5–5.3)
POTASSIUM SERPL-SCNC: 4 MMOL/L — SIGNIFICANT CHANGE UP (ref 3.5–5.3)
RBC # BLD: 3.91 M/UL — SIGNIFICANT CHANGE UP (ref 3.8–5.2)
RBC # FLD: 14.5 % — SIGNIFICANT CHANGE UP (ref 10.3–14.5)
SODIUM SERPL-SCNC: 134 MMOL/L — LOW (ref 135–145)
WBC # BLD: 5.85 K/UL — SIGNIFICANT CHANGE UP (ref 3.8–10.5)
WBC # FLD AUTO: 5.85 K/UL — SIGNIFICANT CHANGE UP (ref 3.8–10.5)

## 2025-06-12 PROCEDURE — 86850 RBC ANTIBODY SCREEN: CPT

## 2025-06-12 PROCEDURE — 36415 COLL VENOUS BLD VENIPUNCTURE: CPT

## 2025-06-12 PROCEDURE — C1889: CPT

## 2025-06-12 PROCEDURE — 86900 BLOOD TYPING SEROLOGIC ABO: CPT

## 2025-06-12 PROCEDURE — 83735 ASSAY OF MAGNESIUM: CPT

## 2025-06-12 PROCEDURE — 85027 COMPLETE CBC AUTOMATED: CPT

## 2025-06-12 PROCEDURE — 80048 BASIC METABOLIC PNL TOTAL CA: CPT

## 2025-06-12 PROCEDURE — 88307 TISSUE EXAM BY PATHOLOGIST: CPT

## 2025-06-12 PROCEDURE — 86901 BLOOD TYPING SEROLOGIC RH(D): CPT

## 2025-06-12 PROCEDURE — 84100 ASSAY OF PHOSPHORUS: CPT

## 2025-06-12 RX ORDER — APIXABAN 2.5 MG/1
1 TABLET, FILM COATED ORAL
Qty: 42 | Refills: 0
Start: 2025-06-12 | End: 2025-07-02

## 2025-06-12 RX ORDER — OMEPRAZOLE 20 MG/1
1 CAPSULE, DELAYED RELEASE ORAL
Qty: 14 | Refills: 0
Start: 2025-06-12 | End: 2025-06-25

## 2025-06-12 RX ORDER — ACETAMINOPHEN 500 MG/5ML
20.3 LIQUID (ML) ORAL
Qty: 1705.2 | Refills: 0
Start: 2025-06-12 | End: 2025-07-02

## 2025-06-12 RX ORDER — POTASSIUM PHOSPHATE, MONOBASIC POTASSIUM PHOSPHATE, DIBASIC INJECTION, 236; 224 MG/ML; MG/ML
21 SOLUTION, CONCENTRATE INTRAVENOUS ONCE
Refills: 0 | Status: COMPLETED | OUTPATIENT
Start: 2025-06-12 | End: 2025-06-12

## 2025-06-12 RX ADMIN — Medication 40 MILLIGRAM(S): at 11:36

## 2025-06-12 RX ADMIN — SCOPOLAMINE 1 PATCH: 1 PATCH, EXTENDED RELEASE TRANSDERMAL at 07:30

## 2025-06-12 RX ADMIN — KETOROLAC TROMETHAMINE 15 MILLIGRAM(S): 30 INJECTION, SOLUTION INTRAMUSCULAR; INTRAVENOUS at 05:25

## 2025-06-12 RX ADMIN — KETOROLAC TROMETHAMINE 15 MILLIGRAM(S): 30 INJECTION, SOLUTION INTRAMUSCULAR; INTRAVENOUS at 11:36

## 2025-06-12 RX ADMIN — KETOROLAC TROMETHAMINE 15 MILLIGRAM(S): 30 INJECTION, SOLUTION INTRAMUSCULAR; INTRAVENOUS at 05:55

## 2025-06-12 RX ADMIN — POTASSIUM PHOSPHATE, MONOBASIC POTASSIUM PHOSPHATE, DIBASIC INJECTION, 83.33 MILLIMOLE(S): 236; 224 SOLUTION, CONCENTRATE INTRAVENOUS at 11:36

## 2025-06-12 RX ADMIN — KETOROLAC TROMETHAMINE 15 MILLIGRAM(S): 30 INJECTION, SOLUTION INTRAMUSCULAR; INTRAVENOUS at 17:09

## 2025-06-12 NOTE — DISCHARGE NOTE NURSING/CASE MANAGEMENT/SOCIAL WORK - PATIENT PORTAL LINK FT
You can access the FollowMyHealth Patient Portal offered by [facility] by registering at the following website: [email or web address]. By joining [employer]’s FollowMyHealth portal, you will also be able to view your health information using other applications (apps) compatible with our system.

## 2025-06-12 NOTE — DISCHARGE NOTE PROVIDER - NSDCFUADDINST_GEN_ALL_CORE_FT
Follow up with Dr. Reed in 1-2 weeks. Call the office at the number below to schedule your appointment. You may shower; soap and water over incision sites. Do not scrub. Pat dry when done. No tub bathing or swimming until cleared. Keep incision sites out of the sun as scars will darken. Ambulate as tolerated, but no heavy lifting (>10lbs) or strenuous exercise. You may continue bariatric clear liquid diet. You should be urinating at least 3-4x per day. Call the office if you experience increasing abdominal pain, nausea, vomiting, or temperature >101 F.    General Discharge Instructions:  Please resume all regular home medications unless specifically advised not to take a particular medication. Also, please take any new medications as prescribed.  Please get plenty of rest, continue to ambulate several times per day, and drink adequate amounts of fluids. Avoid lifting weights greater than 5-10 lbs until you follow-up with your surgeon, who will instruct you further regarding activity restrictions.  Avoid driving or operating heavy machinery while taking pain medications.  Please follow-up with your surgeon and Primary Care Provider (PCP) as advised.  Incision Care:  *Please call your doctor or nurse practitioner if you have increased pain, swelling, redness, or drainage from the incision site.  *Avoid swimming and baths until your follow-up appointment.  *You may shower, and wash surgical incisions with a mild soap and warm water. Gently pat the area dry.      Warning Signs:  Please call your doctor or nurse practitioner if you experience the following:  *You experience new chest pain, pressure, squeezing or tightness.  *New or worsening cough, shortness of breath, or wheeze.  *If you are vomiting and cannot keep down fluids or your medications.  *You are getting dehydrated due to continued vomiting, diarrhea, or other reasons. Signs of dehydration include dry mouth, rapid heartbeat, or feeling dizzy or faint when standing.  *You see blood or dark/black material when you vomit or have a bowel movement.  *You experience burning when you urinate, have blood in your urine, or experience a discharge.  *Your pain is not improving within 8-12 hours or is not gone within 24 hours. Call or return immediately if your pain is getting worse, changes location, or moves to your chest or back.  *You have shaking chills, or fever greater than 101.5 degrees Fahrenheit or 38 degrees Celsius.  *Any change in your symptoms, or any new symptoms that concern you.   Follow up with Dr. Reed in 1-2 weeks. Call the office at the number below to schedule your appointment. You may shower; soap and water over incision sites. Do not scrub. Pat dry when done. No tub bathing or swimming until cleared. Keep incision sites out of the sun as scars will darken. Ambulate as tolerated, but no heavy lifting (>10lbs) or strenuous exercise. You may continue bariatric clear liquid diet. You should be urinating at least 3-4x per day. Call the office if you experience increasing abdominal pain, nausea, vomiting, or temperature >101 F.    Medications  1) Please take Tylenol 650 mg every 4 to 6 hours by mouth for moderate to severe pain control. Please do not exceed over 4,000 mg of Tylenol a day.  2) Please start taking Eliquis 2.5 mg by mouth twice a day starting 3 days after surgery .  3) Please take Omeprazole 40 mg once a day by mouth.        General Discharge Instructions:  Please resume all regular home medications unless specifically advised not to take a particular medication. Also, please take any new medications as prescribed.  Please get plenty of rest, continue to ambulate several times per day, and drink adequate amounts of fluids. Avoid lifting weights greater than 5-10 lbs until you follow-up with your surgeon, who will instruct you further regarding activity restrictions.  Avoid driving or operating heavy machinery while taking pain medications.  Please follow-up with your surgeon and Primary Care Provider (PCP) as advised.  Incision Care:  *Please call your doctor or nurse practitioner if you have increased pain, swelling, redness, or drainage from the incision site.  *Avoid swimming and baths until your follow-up appointment.  *You may shower, and wash surgical incisions with a mild soap and warm water. Gently pat the area dry.      Warning Signs:  Please call your doctor or nurse practitioner if you experience the following:  *You experience new chest pain, pressure, squeezing or tightness.  *New or worsening cough, shortness of breath, or wheeze.  *If you are vomiting and cannot keep down fluids or your medications.  *You are getting dehydrated due to continued vomiting, diarrhea, or other reasons. Signs of dehydration include dry mouth, rapid heartbeat, or feeling dizzy or faint when standing.  *You see blood or dark/black material when you vomit or have a bowel movement.  *You experience burning when you urinate, have blood in your urine, or experience a discharge.  *Your pain is not improving within 8-12 hours or is not gone within 24 hours. Call or return immediately if your pain is getting worse, changes location, or moves to your chest or back.  *You have shaking chills, or fever greater than 101.5 degrees Fahrenheit or 38 degrees Celsius.  *Any change in your symptoms, or any new symptoms that concern you.

## 2025-06-12 NOTE — DISCHARGE NOTE PROVIDER - NSDCMRMEDTOKEN_GEN_ALL_CORE_FT
albuterol 0.63 mg/3 mL (0.021%) inhalation solution: 3 milliliter(s) by nebulizer every 4 hours as needed for  bronchospasm  Nurtec ODT 75 mg oral tablet, disintegratin tab(s) orally prn  valsartan 40 mg oral tablet: 1 tab(s) orally once a day   acetaminophen 325 mg/10.15 mL oral liquid: 20.3 milliliter(s) orally every 6 hours as needed for moderate to severe pain  albuterol 0.63 mg/3 mL (0.021%) inhalation solution: 3 milliliter(s) by nebulizer every 4 hours as needed for  bronchospasm  Eliquis 2.5 mg oral tablet: 1 tab(s) orally 2 times a day Please start taking Eliquis on  and continue taking twice a day for 21 days.  Nurtec ODT 75 mg oral tablet, disintegratin tab(s) orally prn  omeprazole 40 mg oral delayed release capsule: 1 cap(s) orally once a day  valsartan 40 mg oral tablet: 1 tab(s) orally once a day

## 2025-06-12 NOTE — DISCHARGE NOTE PROVIDER - NSDCFUSCHEDAPPT_GEN_ALL_CORE_FT
[facility]  [facility] [address]  Scheduled Appointment: 06/25/2025

## 2025-06-12 NOTE — DISCHARGE NOTE NURSING/CASE MANAGEMENT/SOCIAL WORK - FINANCIAL ASSISTANCE
[facility] provides services at a reduced cost to those who are determined to be eligible through [facility]’s financial assistance program. Information regarding [facility]’s financial assistance program can be found by going to [email or web address] or by calling [phone].

## 2025-06-12 NOTE — PROGRESS NOTE ADULT - SUBJECTIVE AND OBJECTIVE BOX
SUBJECTIVE: Patient seen on morning rounds with chief resident resting comfortably sitting in bed. Patient states she had some gas pain, but otherwise pain is controlled. Pt has tolerated drinking a full cup of water and is working on her second cup. Denies n/v.        MEDICATIONS  (STANDING):  acetaminophen   Oral Liquid .. 1000 milliGRAM(s) Oral every 6 hours  ketorolac   Injectable 15 milliGRAM(s) IV Push every 6 hours  lactated ringers. 1000 milliLiter(s) (75 mL/Hr) IV Continuous <Continuous>  pantoprazole  Injectable 40 milliGRAM(s) IV Push daily  potassium phosphate IVPB 21 milliMole(s) IV Intermittent once    MEDICATIONS  (PRN):  albuterol    90 MICROgram(s) HFA Inhaler 1 Puff(s) Inhalation every 4 hours PRN Shortness of Breath and/or Wheezing  ondansetron Injectable 4 milliGRAM(s) IV Push every 6 hours PRN Nausea      Vital Signs Last 24 Hrs  T(C): 36.9 (12 Jun 2025 05:34), Max: 37.1 (11 Jun 2025 12:31)  T(F): 98.5 (12 Jun 2025 05:34), Max: 98.7 (11 Jun 2025 12:31)  HR: 80 (12 Jun 2025 05:34) (70 - 80)  BP: 134/86 (12 Jun 2025 05:34) (113/75 - 134/86)  BP(mean): --  RR: 18 (12 Jun 2025 05:34) (16 - 18)  SpO2: 95% (12 Jun 2025 05:34) (94% - 98%)    Parameters below as of 12 Jun 2025 05:34  Patient On (Oxygen Delivery Method): room air        PHYSICAL EXAM:    Constitutional: A&Ox3    Respiratory: non labored breathing, no respiratory distress    Gastrointestinal: soft, nontender, nondistended                 Incision: CDI    Genitourinary: voiding freely    Extremities: WWP                I&O's Detail    11 Jun 2025 07:01  -  12 Jun 2025 07:00  --------------------------------------------------------  IN:    Oral Fluid: 660 mL  Total IN: 660 mL    OUT:    Voided (mL): 800 mL  Total OUT: 800 mL    Total NET: -140 mL          LABS:                        11.1   5.85  )-----------( 233      ( 12 Jun 2025 06:24 )             34.3     06-12    134[L]  |  104  |  9   ----------------------------<  90  4.0   |  22  |  0.74    Ca    8.3[L]      12 Jun 2025 06:24  Phos  1.9     06-12  Mg     2.1     06-12        Urinalysis Basic - ( 12 Jun 2025 06:24 )    Color: x / Appearance: x / SG: x / pH: x  Gluc: 90 mg/dL / Ketone: x  / Bili: x / Urobili: x   Blood: x / Protein: x / Nitrite: x   Leuk Esterase: x / RBC: x / WBC x   Sq Epi: x / Non Sq Epi: x / Bacteria: x        RADIOLOGY & ADDITIONAL STUDIES:

## 2025-06-12 NOTE — DISCHARGE NOTE PROVIDER - HOSPITAL COURSE
44 y old F with PMH of Obesity s/p gastric band in 2011 Gastric Sleeve surgery in 2014 then tx Mounjaro 2023 lost 70 lbs stopped 7/24 due to insurance , followed by Endocrinology for Thyroid nodules , Asthma, Seasonal allergies, GERD/Gastritis, Anemia, Migraine headaches, Renal stones -with microscopic hematuria evaluated by Urology , most recently with R breast mass evaluated by Breast Surgery s/p R breast mass biopsy showed 10/30/24 : portions of lymph node with necrotizing lymphadenitis. Now here for weight gain after bariatric surgery for sleeve gastrectomy conversion to mDS + HHR (6/11). Patient's operation was uncomplicated and post-operative course was uneventful. At time of discharge pt is tolerating diet, pain well controlled, pt is ambulating/voiding freely, having adequate bowel function. Pt is hemodynamically normal/stable and medically ready for discharge with outpatient follow-up.

## 2025-06-12 NOTE — DISCHARGE NOTE PROVIDER - NSDCCPCAREPLAN_GEN_ALL_CORE_FT
PRINCIPAL DISCHARGE DIAGNOSIS  Diagnosis: Morbid obesity  Assessment and Plan of Treatment:       SECONDARY DISCHARGE DIAGNOSES  Diagnosis: HTN (hypertension)  Assessment and Plan of Treatment:     Diagnosis: GERD (gastroesophageal reflux disease)  Assessment and Plan of Treatment:     Diagnosis: Asthma  Assessment and Plan of Treatment:

## 2025-06-12 NOTE — DISCHARGE NOTE PROVIDER - CARE PROVIDER_API CALL
[facility]  [address]  [place] [address]  Phone: [phone]  Fax: [phone]  Follow Up Time:

## 2025-06-12 NOTE — DISCHARGE NOTE PROVIDER - NSDCCPTREATMENT_GEN_ALL_CORE_FT
PRINCIPAL PROCEDURE  Procedure: Single anastomosis duodenal switch  Findings and Treatment:       SECONDARY PROCEDURE  Procedure: Herniorrhaphy, hiatal, laparoscopic  Findings and Treatment:

## 2025-06-12 NOTE — PROGRESS NOTE ADULT - ASSESSMENT
44 y old F with PMH of Obesity s/p gastric band in 2011 Gastric Sleeve surgery in 2014 then tx Mounjaro 2023 lost 70 lbs stopped 7/24 due to insurance , followed by Endocrinology for Thyroid nodules , Asthma, Seasonal allergies, GERD/Gastritis, Anemia, Migraine headaches, Renal stones -with microscopic hematuria evaluated by Urology , most recently with R breast mass evaluated by Breast Surgery s/p R breast mass biopsy showed 10/30/24 : portions of lymph node with necrotizing lymphadenitis. Now here for weight gain after bariatric surgery for sleeve gastrectomy conversion to mDS + HHR (6/11).     BCLD  Tylenol/Toradol  Zofran PRN  Thiamine POD 0-2  SCDs/IS/OOB  AM Labs  Nutrition consult
44 y old F with PMH of Obesity s/p gastric band in 2011 Gastric Sleeve surgery in 2014 then tx Mounjaro 2023 lost 70 lbs stopped 7/24 due to insurance , followed by Endocrinology for Thyroid nodules , Asthma, Seasonal allergies, GERD/Gastritis, Anemia, Migraine headaches, Renal stones -with microscopic hematuria evaluated by Urology , most recently with R breast mass evaluated by Breast Surgery s/p R breast mass biopsy showed 10/30/24 : portions of lymph node with necrotizing lymphadenitis. Now here for weight gain after bariatric surgery for sleeve to DOM-S, hiatal hernia repair and excision of remanant fundus     BCLD  Tylenol/Toradol  Zofran PRN  Thiamine POD 0-2  SCDs/IS/OOB  AM Labs  Nutrition consult
44 y old F with PMH of Obesity s/p gastric band in 2011 Gastric Sleeve surgery in 2014 then tx Mounjaro 2023 lost 70 lbs stopped 7/24 due to insurance , followed by Endocrinology for Thyroid nodules , Asthma, Seasonal allergies, GERD/Gastritis, Anemia, Migraine headaches, Renal stones -with microscopic hematuria evaluated by Urology , most recently with R breast mass evaluated by Breast Surgery s/p R breast mass biopsy showed 10/30/24 : portions of lymph node with necrotizing lymphadenitis. Now here for weight gain after bariatric surgery for sleeve to DOM-S, hiatal hernia repair and excision of remanant fundus     PLAN  BCLD  SCDs/IS/OOB  HSQH

## 2025-06-13 ENCOUNTER — NON-APPOINTMENT (OUTPATIENT)
Age: 45
End: 2025-06-13

## 2025-06-23 DIAGNOSIS — Z88.9 ALLERGY STATUS TO UNSPECIFIED DRUGS, MEDICAMENTS AND BIOLOGICAL SUBSTANCES: ICD-10-CM

## 2025-06-23 DIAGNOSIS — I10 ESSENTIAL (PRIMARY) HYPERTENSION: ICD-10-CM

## 2025-06-23 DIAGNOSIS — Z88.6 ALLERGY STATUS TO ANALGESIC AGENT: ICD-10-CM

## 2025-06-23 DIAGNOSIS — Z87.891 PERSONAL HISTORY OF NICOTINE DEPENDENCE: ICD-10-CM

## 2025-06-23 DIAGNOSIS — Z40.8 ENCOUNTER FOR OTHER PROPHYLACTIC SURGERY: ICD-10-CM

## 2025-06-23 DIAGNOSIS — K44.9 DIAPHRAGMATIC HERNIA WITHOUT OBSTRUCTION OR GANGRENE: ICD-10-CM

## 2025-06-23 DIAGNOSIS — G43.909 MIGRAINE, UNSPECIFIED, NOT INTRACTABLE, WITHOUT STATUS MIGRAINOSUS: ICD-10-CM

## 2025-06-23 DIAGNOSIS — J45.909 UNSPECIFIED ASTHMA, UNCOMPLICATED: ICD-10-CM

## 2025-06-23 DIAGNOSIS — Z98.84 BARIATRIC SURGERY STATUS: ICD-10-CM

## 2025-06-23 DIAGNOSIS — D25.9 LEIOMYOMA OF UTERUS, UNSPECIFIED: ICD-10-CM

## 2025-06-23 DIAGNOSIS — K66.0 PERITONEAL ADHESIONS (POSTPROCEDURAL) (POSTINFECTION): ICD-10-CM

## 2025-06-23 DIAGNOSIS — K21.9 GASTRO-ESOPHAGEAL REFLUX DISEASE WITHOUT ESOPHAGITIS: ICD-10-CM

## 2025-06-23 DIAGNOSIS — E66.01 MORBID (SEVERE) OBESITY DUE TO EXCESS CALORIES: ICD-10-CM

## 2025-06-25 ENCOUNTER — APPOINTMENT (OUTPATIENT)
Dept: BARIATRICS | Facility: CLINIC | Age: 45
End: 2025-06-25
Payer: COMMERCIAL

## 2025-06-25 VITALS
HEART RATE: 81 BPM | HEIGHT: 65 IN | SYSTOLIC BLOOD PRESSURE: 110 MMHG | OXYGEN SATURATION: 98 % | WEIGHT: 220.4 LBS | DIASTOLIC BLOOD PRESSURE: 77 MMHG | BODY MASS INDEX: 36.72 KG/M2 | TEMPERATURE: 97.3 F

## 2025-06-25 PROBLEM — I10 ESSENTIAL (PRIMARY) HYPERTENSION: Chronic | Status: ACTIVE | Noted: 2025-06-10

## 2025-06-25 PROCEDURE — 99024 POSTOP FOLLOW-UP VISIT: CPT

## 2025-08-06 ENCOUNTER — APPOINTMENT (OUTPATIENT)
Dept: BARIATRICS | Facility: CLINIC | Age: 45
End: 2025-08-06
Payer: COMMERCIAL

## 2025-08-06 VITALS
OXYGEN SATURATION: 100 % | BODY MASS INDEX: 34.82 KG/M2 | TEMPERATURE: 97.2 F | DIASTOLIC BLOOD PRESSURE: 88 MMHG | WEIGHT: 209 LBS | HEART RATE: 78 BPM | SYSTOLIC BLOOD PRESSURE: 124 MMHG | HEIGHT: 65 IN

## 2025-08-06 DIAGNOSIS — Z98.84 BARIATRIC SURGERY STATUS: ICD-10-CM

## 2025-08-06 PROCEDURE — 99024 POSTOP FOLLOW-UP VISIT: CPT

## 2025-08-19 ENCOUNTER — APPOINTMENT (OUTPATIENT)
Dept: NEPHROLOGY | Facility: CLINIC | Age: 45
End: 2025-08-19
Payer: COMMERCIAL

## 2025-08-19 ENCOUNTER — APPOINTMENT (OUTPATIENT)
Dept: NEPHROLOGY | Facility: CLINIC | Age: 45
End: 2025-08-19

## 2025-08-19 VITALS — SYSTOLIC BLOOD PRESSURE: 123 MMHG | DIASTOLIC BLOOD PRESSURE: 83 MMHG | HEART RATE: 64 BPM

## 2025-08-19 DIAGNOSIS — R34 ANURIA AND OLIGURIA: ICD-10-CM

## 2025-08-19 DIAGNOSIS — E66.812 OBESITY, CLASS 2: ICD-10-CM

## 2025-08-19 DIAGNOSIS — R82.991 HYPOCITRATURIA: ICD-10-CM

## 2025-08-19 DIAGNOSIS — R80.9 PROTEINURIA, UNSPECIFIED: ICD-10-CM

## 2025-08-19 DIAGNOSIS — N20.0 CALCULUS OF KIDNEY: ICD-10-CM

## 2025-08-19 PROCEDURE — G2211 COMPLEX E/M VISIT ADD ON: CPT | Mod: NC

## 2025-08-19 PROCEDURE — 99214 OFFICE O/P EST MOD 30 MIN: CPT

## 2025-08-20 LAB
ALBUMIN, RANDOM URINE: <1.2 MG/DL
APPEARANCE: CLEAR
BILIRUBIN URINE: NEGATIVE
BLOOD URINE: NEGATIVE
COLOR: YELLOW
CREAT SPEC-SCNC: 126 MG/DL
GLUCOSE QUALITATIVE U: NEGATIVE MG/DL
KETONES URINE: ABNORMAL MG/DL
LEUKOCYTE ESTERASE URINE: NEGATIVE
MICROALBUMIN/CREAT 24H UR-RTO: NORMAL MG/G
NITRITE URINE: NEGATIVE
PH URINE: 7
PROTEIN URINE: NEGATIVE MG/DL
SPECIFIC GRAVITY URINE: 1.02
UROBILINOGEN URINE: 1 MG/DL

## 2025-09-17 ENCOUNTER — APPOINTMENT (OUTPATIENT)
Dept: BARIATRICS | Facility: CLINIC | Age: 45
End: 2025-09-17
Payer: COMMERCIAL

## 2025-09-17 VITALS
TEMPERATURE: 97.2 F | HEART RATE: 75 BPM | BODY MASS INDEX: 34.16 KG/M2 | SYSTOLIC BLOOD PRESSURE: 133 MMHG | DIASTOLIC BLOOD PRESSURE: 89 MMHG | OXYGEN SATURATION: 97 % | HEIGHT: 65 IN | WEIGHT: 205 LBS

## 2025-09-17 DIAGNOSIS — Z98.84 BARIATRIC SURGERY STATUS: ICD-10-CM

## 2025-09-17 PROCEDURE — 99213 OFFICE O/P EST LOW 20 MIN: CPT

## 2025-09-17 RX ORDER — AMLODIPINE BESYLATE 5 MG/1
5 TABLET ORAL
Refills: 0 | Status: ACTIVE | COMMUNITY

## (undated) DEVICE — GOWN ROYAL SILK XL

## (undated) DEVICE — SYR LUER LOK 30CC

## (undated) DEVICE — SUT QUILL POLYPROPYLENE 1 15CM SH-1

## (undated) DEVICE — TUBING STRYKER PNEUMOCLEAR HIGH FLOW

## (undated) DEVICE — POSITIONER FOAM EGG CRATE ULNAR 2PCS (PINK)

## (undated) DEVICE — GLV 7.5 PROTEXIS (WHITE)

## (undated) DEVICE — MARKING PEN W RULER

## (undated) DEVICE — TROCAR ETHICON ENDOPATH XCEL BLADELESS 12MM X 100MM STABILITY

## (undated) DEVICE — TROCAR ETHICON ENDOPATH XCEL UNIVERSAL SLEEVE WITH OPTIVIEW 5MM X 100MM

## (undated) DEVICE — TROCAR ETHICON ENDOPATH XCEL BLADELESS 5MM X 100MM STABILITY

## (undated) DEVICE — SUT MONOCRYL 4-0 27" PS-2 UNDYED

## (undated) DEVICE — DRAPE 1/2 SHEET 40X57"

## (undated) DEVICE — SUT PDS II 2-0 27" SH

## (undated) DEVICE — SUT PDO 2-0 1/2 CIRCLE 26MM NDL 15CM

## (undated) DEVICE — DRSG DERMABOND 0.7ML

## (undated) DEVICE — D HELP - CLEARVIEW CLEARIFY SYSTEM

## (undated) DEVICE — SYR CATH TIP 2 OZ

## (undated) DEVICE — VENODYNE/SCD SLEEVE CALF MEDIUM

## (undated) DEVICE — TIP METZENBAUM SCISSOR MONOPOLAR ENDOCUT (ORANGE)

## (undated) DEVICE — BOSTON SCIENTIFIC UROLOK II SCOPE ADAPTOR

## (undated) DEVICE — TUBING RANGER FLUID IRRIGATION SET DISP

## (undated) DEVICE — CATH NG SALEM SUMP 16FR

## (undated) DEVICE — SUT VICRYL PLUS 0 54" TIES

## (undated) DEVICE — STAPLER ECHELON FLEX POWERED PLUS 440MM

## (undated) DEVICE — SYR LUER LOK 50CC

## (undated) DEVICE — Device

## (undated) DEVICE — SUT QUILL POLYPROPYLENE 1 15CM 22MM CLEAR

## (undated) DEVICE — DRAPE C ARM 41X74"

## (undated) DEVICE — LIGASURE MARYLAND 5MM X 37CM

## (undated) DEVICE — GLV 6.5 PROTEXIS (WHITE)

## (undated) DEVICE — PACK CYSTO

## (undated) DEVICE — ENDOCATCH II 15MM

## (undated) DEVICE — DRAPE 3/4 SHEET 52X76"

## (undated) DEVICE — VENODYNE/SCD SLEEVE CALF BARIATRIC

## (undated) DEVICE — PACK GENERAL LAPAROSCOPY

## (undated) DEVICE — STAPLER COVIDIEN ENDO GIA XL HANDLE

## (undated) DEVICE — TROCAR ETHICON ENDOPATH XCEL BLADELESS 15MM X 100MM STABILITY

## (undated) DEVICE — DRAPE LEGGINGS XL